# Patient Record
Sex: MALE | Race: BLACK OR AFRICAN AMERICAN
[De-identification: names, ages, dates, MRNs, and addresses within clinical notes are randomized per-mention and may not be internally consistent; named-entity substitution may affect disease eponyms.]

---

## 2017-05-09 ENCOUNTER — PRIOR ORIGINAL RECORDS (OUTPATIENT)
Dept: OTHER | Age: 67
End: 2017-05-09

## 2017-05-15 ENCOUNTER — PRIOR ORIGINAL RECORDS (OUTPATIENT)
Dept: OTHER | Age: 67
End: 2017-05-15

## 2017-05-15 ENCOUNTER — LAB ENCOUNTER (OUTPATIENT)
Dept: LAB | Facility: HOSPITAL | Age: 67
End: 2017-05-15
Attending: INTERNAL MEDICINE
Payer: MEDICARE

## 2017-05-15 DIAGNOSIS — E78.00 HYPERCHOLESTEREMIA: ICD-10-CM

## 2017-05-15 LAB
ALT (SGPT): 14 U/L
AST (SGOT): 23 U/L
CHOLESTEROL, TOTAL: 166 MG/DL
HDL CHOLESTEROL: 40 MG/DL
LDL CHOLESTEROL: 95 MG/DL
NON-HDL CHOLESTEROL: 126 MG/DL
TRIGLYCERIDES: 153 MG/DL

## 2017-05-15 PROCEDURE — 82465 ASSAY BLD/SERUM CHOLESTEROL: CPT

## 2017-05-15 PROCEDURE — 36415 COLL VENOUS BLD VENIPUNCTURE: CPT

## 2017-05-15 PROCEDURE — 84460 ALANINE AMINO (ALT) (SGPT): CPT

## 2017-05-15 PROCEDURE — 80061 LIPID PANEL: CPT

## 2017-05-15 PROCEDURE — 84450 TRANSFERASE (AST) (SGOT): CPT

## 2017-12-29 ENCOUNTER — PRIOR ORIGINAL RECORDS (OUTPATIENT)
Dept: OTHER | Age: 67
End: 2017-12-29

## 2018-02-21 ENCOUNTER — OFFICE VISIT (OUTPATIENT)
Dept: INTERNAL MEDICINE CLINIC | Facility: CLINIC | Age: 68
End: 2018-02-21

## 2018-02-21 VITALS
BODY MASS INDEX: 31.77 KG/M2 | DIASTOLIC BLOOD PRESSURE: 85 MMHG | HEART RATE: 62 BPM | TEMPERATURE: 98 F | RESPIRATION RATE: 18 BRPM | WEIGHT: 232 LBS | SYSTOLIC BLOOD PRESSURE: 135 MMHG | HEIGHT: 71.5 IN

## 2018-02-21 DIAGNOSIS — Z12.11 SCREENING FOR COLON CANCER: ICD-10-CM

## 2018-02-21 DIAGNOSIS — Z76.89 ENCOUNTER TO ESTABLISH CARE: ICD-10-CM

## 2018-02-21 DIAGNOSIS — Z12.5 PROSTATE CANCER SCREENING: ICD-10-CM

## 2018-02-21 DIAGNOSIS — I10 ESSENTIAL HYPERTENSION: Primary | ICD-10-CM

## 2018-02-21 PROCEDURE — 99205 OFFICE O/P NEW HI 60 MIN: CPT | Performed by: INTERNAL MEDICINE

## 2018-02-21 PROCEDURE — G0463 HOSPITAL OUTPT CLINIC VISIT: HCPCS | Performed by: INTERNAL MEDICINE

## 2018-02-21 RX ORDER — LISINOPRIL 20 MG/1
20 TABLET ORAL DAILY
COMMUNITY
Start: 2018-01-11 | End: 2018-12-26

## 2018-02-21 RX ORDER — HYDROCHLOROTHIAZIDE 12.5 MG/1
12.5 CAPSULE, GELATIN COATED ORAL EVERY MORNING
COMMUNITY
Start: 2018-02-20 | End: 2018-12-26

## 2018-02-21 NOTE — PATIENT INSTRUCTIONS
Problem List Items Addressed This Visit        Unprioritized    Encounter to establish care     New patient, transfer of care from currently. No significant past medical history other than hypertension which seems to be well managed.   Immunization history Prostate cancer screening        Relevant Orders    PSA SCREEN    Screening for colon cancer        Relevant Orders    OCCULT BLOOD, FECAL, IMMUNOASSAY    GASTRO - INTERNAL

## 2018-02-21 NOTE — PROGRESS NOTES
HPI:    Patient ID: Ezequiel Gavin is a 69year old male. New pt,transfer of care    Seen by Dr. Sindy Wei at [place]. Seen by Dr Franky Hernandez for cardiology.   Hx of htn and has been on lisinopril 20 mg once daily and hydrochlorothiazide 12.5 mg once Respiratory: Negative. Negative for shortness of breath. Cardiovascular: Positive for leg swelling (intermittent). Negative for PND. Medeiros off and on   Gastrointestinal: Negative. Genitourinary: Negative. Musculoskeletal: Negative.     Skin is normal. Judgment and thought content normal.   Nursing note and vitals reviewed.              ASSESSMENT/PLAN:     Problem List Items Addressed This Visit        Unprioritized    Encounter to establish care     New patient, transfer of care from currentl URINALYSIS, ROUTINE    ASSAY, THYROID STIM HORMONE    CARD TREADMILL STRESS, ADULT (QME=[id number])      Other Visit Diagnoses     Prostate cancer screening        Relevant Orders    PSA SCREEN    Screening for colon cancer        Relevant Orders    OCCULT BLOO

## 2018-02-21 NOTE — ASSESSMENT & PLAN NOTE
Blood pressure 135/85, pulse 62, temperature 97.8 °F (36.6 °C), temperature source Oral, resp. rate 18, height 5' 11.5\" (1.816 m), weight 232 lb (105.2 kg).      Stable blood pressure on lisinopril 20 mg once daily and hydrochlorothiazide 12.5 mg once melvi

## 2018-02-21 NOTE — ASSESSMENT & PLAN NOTE
New patient, transfer of care from currently. No significant past medical history other than hypertension which seems to be well managed.   Immunization history not available but he has had all his immunizations completed at [facility] to bring in rec

## 2018-06-27 ENCOUNTER — MYAURORA ACCOUNT LINK (OUTPATIENT)
Dept: OTHER | Age: 68
End: 2018-06-27

## 2018-06-27 ENCOUNTER — PRIOR ORIGINAL RECORDS (OUTPATIENT)
Dept: OTHER | Age: 68
End: 2018-06-27

## 2018-12-17 ENCOUNTER — PATIENT OUTREACH (OUTPATIENT)
Dept: CASE MANAGEMENT | Age: 68
End: 2018-12-17

## 2018-12-17 NOTE — PROGRESS NOTES
Outreached to patient in regards to scheduling Medicare Annual exam (AHA). Left message for patient to return my call at ext. [phone]. Patient is eligible at this time. Thank you.

## 2018-12-17 NOTE — PROGRESS NOTES
Patient returned my call and scheduled his Medicare Annual Exam [facility]) with his PCP for 12/26/2018. Thank you.

## 2018-12-26 ENCOUNTER — OFFICE VISIT (OUTPATIENT)
Dept: INTERNAL MEDICINE CLINIC | Facility: CLINIC | Age: 68
End: 2018-12-26
Payer: MEDICARE

## 2018-12-26 VITALS
HEART RATE: 71 BPM | WEIGHT: 236 LBS | SYSTOLIC BLOOD PRESSURE: 149 MMHG | HEIGHT: 71.5 IN | DIASTOLIC BLOOD PRESSURE: 86 MMHG | RESPIRATION RATE: 16 BRPM | BODY MASS INDEX: 32.32 KG/M2

## 2018-12-26 DIAGNOSIS — R73.03 PREDIABETES: ICD-10-CM

## 2018-12-26 DIAGNOSIS — H26.9 CATARACT, UNSPECIFIED CATARACT TYPE, UNSPECIFIED LATERALITY: ICD-10-CM

## 2018-12-26 DIAGNOSIS — Z00.00 ENCOUNTER FOR ANNUAL HEALTH EXAMINATION: ICD-10-CM

## 2018-12-26 DIAGNOSIS — Z12.11 COLON CANCER SCREENING: ICD-10-CM

## 2018-12-26 DIAGNOSIS — E78.5 DYSLIPIDEMIA: ICD-10-CM

## 2018-12-26 DIAGNOSIS — I10 ESSENTIAL HYPERTENSION: ICD-10-CM

## 2018-12-26 DIAGNOSIS — Z00.00 MEDICARE ANNUAL WELLNESS VISIT, INITIAL: Primary | ICD-10-CM

## 2018-12-26 PROCEDURE — G0439 PPPS, SUBSEQ VISIT: HCPCS | Performed by: INTERNAL MEDICINE

## 2018-12-26 PROCEDURE — 96160 PT-FOCUSED HLTH RISK ASSMT: CPT | Performed by: INTERNAL MEDICINE

## 2018-12-26 RX ORDER — LISINOPRIL 20 MG/1
20 TABLET ORAL DAILY
Qty: 90 TABLET | Refills: 2 | Status: SHIPPED | OUTPATIENT
Start: 2018-12-26 | End: 2019-01-23

## 2018-12-26 RX ORDER — HYDROCHLOROTHIAZIDE 12.5 MG/1
12.5 CAPSULE, GELATIN COATED ORAL EVERY MORNING
Qty: 90 CAPSULE | Refills: 2 | Status: SHIPPED | OUTPATIENT
Start: 2018-12-26 | End: 2019-10-16

## 2018-12-26 NOTE — ASSESSMENT & PLAN NOTE
Patient is currently not on medications. This was from his old records through cardiology. His old physician is Dr. Xenia parikh– [phone]. Will call to obtain some records at this time.

## 2018-12-26 NOTE — ASSESSMENT & PLAN NOTE
Recheck labs have been ordered, advised strict diet controlled to restrict sugars, starches and carbohydrates in diet. Exercise for about 20 minutes daily.

## 2018-12-26 NOTE — PROGRESS NOTES
HPI:   Shahrzad Day is a 77year old male who presents for a Medicare Initial Annual Wellness visit (Once after 12 month Medicare anniversary) .     His last annual assessment has been over 1 year: Annual Physical due on 11/10/1952         Fall/Risk Ass 05/15/2017    HDL 40 05/15/2017    LDL 95 05/15/2017    TRIG 153 (H) 05/15/2017          Last Chemistry Labs:   Lab Results   Component Value Date    AST 23 05/15/2017    ALT 14 (L) 05/15/2017        CBC  (most recent labs)   No results found for: WBC, HGB encounter: 5' 11.5\" (1.816 m). Weight as of this encounter: 236 lb (107 kg).     Medicare Hearing Assessment  (Required for AWV/SWV)    Questionnaire        Visual Acuity                           Physical Exam      Vaccination History     Immunization to palpation. Small hemorrhoids present.  guaic negetive brown stools. Colonoscopy is overdue–fit immunoglobulin screening study has been provided but not completed.   Additionally he was advised to follow-up with gastroenterology for a colonoscopy–Dr. Catia Brantley lifestyle, and exercise. Lab work ordered. Further testing ordered. Patient reassured. Continue with current treatment plan. Return in 4 weeks (on 1/23/2019).      Suzette Lantigua MD, 12/26/2018     General Health     In the past six months, have you l your 63th birthday    Pneumococcal 23 (Pneumovax)  Covered Once after 65 No vaccine history found Please get once after your 65th birthday    Hepatitis B for Moderate/High Risk No vaccine history found Medium/high risk factors:   End-stage renal disease specific personalized plan for behavioral change including discussion about personal harm from his obesity and benefits of his losing weight. Agree:  Through a collaborative effort, we selected treatment goals of weight based on Grace geronimo

## 2018-12-26 NOTE — ASSESSMENT & PLAN NOTE
Blood pressure 149/86, pulse 71, resp. rate 16, height 5' 11.5\" (1.816 m), weight 236 lb (107 kg). Blood pressure slightly elevated today. Patient has been on lisinopril 20 mg daily and hydrochlorothiazide 12.5 mg daily.   Reviewed his records from [address]

## 2018-12-26 NOTE — ASSESSMENT & PLAN NOTE
Normal exam.  Labs as ordered. Skin check–normal  No cervical, axillary, inguinal lymphadenopathy. Hernial orifices intact. Rectal exam normal,prostate normal to palpation. Small hemorrhoids present.  guaic negetive brown stools.   Colonoscopy is overdu

## 2018-12-26 NOTE — PATIENT INSTRUCTIONS
Problem List Items Addressed This Visit        Unprioritized    Dyslipidemia     Patient is currently not on medications. This was from his old records through cardiology. His old physician is Dr. Sigifredo parikh– [phone].   Will call to obtain some re had done at [facility]. He is advised to visit the [employer] that he has had this done at an drop of the immunization records tomorrow to update his records.              Other Visit Diagnoses     Cataract, unspecified cataract type, unspecified laterality (once between ages 73-68)  No results found for this or any previous visit.  Limited to patients who meet one of the following criteria:   • Men who are 73-68 years old and have smoked more than 100 cigarettes in their lifetime   • Anyone with a family hist retarded   Persons who live in the same house as a HepB virus carrier   Homosexual men   Illicit injectable drug abusers     Tetanus Toxoid- Only covered with a cut with metal- TD and TDaP Not covered by Medicare Part B) No orders found for this or any pre

## 2018-12-27 ENCOUNTER — LAB ENCOUNTER (OUTPATIENT)
Dept: LAB | Facility: HOSPITAL | Age: 68
End: 2018-12-27
Attending: INTERNAL MEDICINE
Payer: MEDICARE

## 2018-12-27 DIAGNOSIS — Z12.5 PROSTATE CANCER SCREENING: ICD-10-CM

## 2018-12-27 DIAGNOSIS — I10 ESSENTIAL HYPERTENSION: ICD-10-CM

## 2018-12-27 PROCEDURE — 36415 COLL VENOUS BLD VENIPUNCTURE: CPT

## 2018-12-27 PROCEDURE — 80061 LIPID PANEL: CPT

## 2018-12-27 PROCEDURE — 85025 COMPLETE CBC W/AUTO DIFF WBC: CPT

## 2018-12-27 PROCEDURE — 84443 ASSAY THYROID STIM HORMONE: CPT

## 2018-12-27 PROCEDURE — 80053 COMPREHEN METABOLIC PANEL: CPT

## 2018-12-27 PROCEDURE — 81003 URINALYSIS AUTO W/O SCOPE: CPT

## 2019-01-03 ENCOUNTER — HOSPITAL ENCOUNTER (OUTPATIENT)
Dept: CV DIAGNOSTICS | Facility: HOSPITAL | Age: 69
Discharge: HOME | End: 2019-01-03
Attending: INTERNAL MEDICINE
Payer: MEDICARE

## 2019-01-03 DIAGNOSIS — I10 ESSENTIAL HYPERTENSION: ICD-10-CM

## 2019-01-03 PROCEDURE — 93016 CV STRESS TEST SUPVJ ONLY: CPT | Performed by: INTERNAL MEDICINE

## 2019-01-03 PROCEDURE — 93018 CV STRESS TEST I&R ONLY: CPT | Performed by: INTERNAL MEDICINE

## 2019-01-03 PROCEDURE — 93017 CV STRESS TEST TRACING ONLY: CPT | Performed by: INTERNAL MEDICINE

## 2019-01-23 ENCOUNTER — OFFICE VISIT (OUTPATIENT)
Dept: INTERNAL MEDICINE CLINIC | Facility: CLINIC | Age: 69
End: 2019-01-23
Payer: MEDICARE

## 2019-01-23 VITALS
WEIGHT: 235.63 LBS | DIASTOLIC BLOOD PRESSURE: 86 MMHG | HEART RATE: 76 BPM | HEIGHT: 71.5 IN | BODY MASS INDEX: 32.27 KG/M2 | TEMPERATURE: 97 F | SYSTOLIC BLOOD PRESSURE: 143 MMHG | RESPIRATION RATE: 20 BRPM

## 2019-01-23 DIAGNOSIS — I10 ESSENTIAL HYPERTENSION: ICD-10-CM

## 2019-01-23 DIAGNOSIS — B36.9 DERMATOMYCOSIS: ICD-10-CM

## 2019-01-23 DIAGNOSIS — E78.5 DYSLIPIDEMIA: Primary | ICD-10-CM

## 2019-01-23 PROCEDURE — 99214 OFFICE O/P EST MOD 30 MIN: CPT | Performed by: INTERNAL MEDICINE

## 2019-01-23 PROCEDURE — G0463 HOSPITAL OUTPT CLINIC VISIT: HCPCS | Performed by: INTERNAL MEDICINE

## 2019-01-23 RX ORDER — OLMESARTAN MEDOXOMIL 20 MG/1
20 TABLET ORAL DAILY
Qty: 90 TABLET | Refills: 1 | Status: SHIPPED | OUTPATIENT
Start: 2019-01-23 | End: 2019-08-01

## 2019-01-23 RX ORDER — CLOTRIMAZOLE AND BETAMETHASONE DIPROPIONATE 10; .64 MG/G; MG/G
CREAM TOPICAL
Qty: 45 G | Refills: 0 | Status: SHIPPED | OUTPATIENT
Start: 2019-01-23 | End: 2019-10-16

## 2019-01-23 NOTE — ASSESSMENT & PLAN NOTE
Lipid panel looks well controlled on diet alone. Patient is advised to continue strict diet restriction of fatty foods, fried foods, desserts, sugars. Continue to monitor labs, recheck labs prior to the next office visit.

## 2019-01-23 NOTE — PROGRESS NOTES
HPI:    Patient ID: Aditya Gamboa is a 77year old male.     Normal exercise ECG   Resting HTN-appropriate response   Frequent PAC's, Occasional PVC's, brief run of atrial tachycardia in immediate recovery  No chest pain   Below average exercise capacity E apnea or a thyroid problem. Rash   This is a recurrent problem. The current episode started more than 1 month ago. The problem has been waxing and waning (2 patches of itchy dry skin left neck and upper anterior chest) since onset.  The rash is characteri to light. Neck: Normal range of motion. Neck supple. No JVD present. No thyromegaly present. Cardiovascular: Normal rate, regular rhythm, normal heart sounds and intact distal pulses.    Pulmonary/Chest: Effort normal and breath sounds normal.   Abdomin prior to the next office visit.          Relevant Orders    COMP METABOLIC PANEL (14)    LIPID PANEL    Dermatomycosis     2 itchy red patches of skin around the neck and upper chest.  Advised to keep the areas clean and dry and local application of Lotriso

## 2019-01-23 NOTE — ASSESSMENT & PLAN NOTE
Blood pressure 143/86, pulse 76, temperature 97.3 °F (36.3 °C), temperature source Oral, resp. rate 20, height 5' 11.5\" (1.816 m), weight 235 lb 9.6 oz (106.9 kg). Blood pressure remains elevated even upon recheck.   This was elevated even during the stre

## 2019-01-23 NOTE — PATIENT INSTRUCTIONS
Problem List Items Addressed This Visit        Unprioritized    Dermatomycosis     2 itchy red patches of skin around the neck and upper chest.  Advised to keep the areas clean and dry and local application of Lotrisone 1-2 times daily for the next 3-4 wee

## 2019-01-23 NOTE — ASSESSMENT & PLAN NOTE
2 itchy red patches of skin around the neck and upper chest.  Advised to keep the areas clean and dry and local application of Lotrisone 1-2 times daily for the next 3-4 weeks as directed.   Will reassess needs if any recurrence

## 2019-02-26 ENCOUNTER — TELEPHONE (OUTPATIENT)
Dept: CASE MANAGEMENT | Age: 69
End: 2019-02-26

## 2019-02-26 NOTE — TELEPHONE ENCOUNTER
Patient is eligible for a 2019 Annual Medicare Health Assessment. Patient is scheduled for a f/u appt on 4/19/19. Would he be willing to change appt to an AHA on 4/18 @ 2pm or another day? Left message to call back [phone].

## 2019-02-28 VITALS
HEART RATE: 79 BPM | HEIGHT: 72 IN | SYSTOLIC BLOOD PRESSURE: 134 MMHG | WEIGHT: 231 LBS | BODY MASS INDEX: 31.29 KG/M2 | OXYGEN SATURATION: 97 % | DIASTOLIC BLOOD PRESSURE: 80 MMHG

## 2019-03-01 VITALS
HEIGHT: 72 IN | HEART RATE: 71 BPM | SYSTOLIC BLOOD PRESSURE: 124 MMHG | BODY MASS INDEX: 31.15 KG/M2 | OXYGEN SATURATION: 97 % | WEIGHT: 230 LBS | DIASTOLIC BLOOD PRESSURE: 74 MMHG

## 2019-08-01 RX ORDER — OLMESARTAN MEDOXOMIL 20 MG/1
20 TABLET ORAL DAILY
Qty: 90 TABLET | Refills: 1 | Status: SHIPPED | OUTPATIENT
Start: 2019-08-01 | End: 2019-10-16

## 2019-08-02 NOTE — TELEPHONE ENCOUNTER
Refill passed per [facility] protocol.   Hypertensive Medications  Protocol Criteria:  · Appointment scheduled in the past 6 months or in the next 3 months  · BMP or CMP in the past 12 months  · Creatinine result < 2  Recent Outpatient Visits

## 2019-08-14 ENCOUNTER — MED REC SCAN ONLY (OUTPATIENT)
Dept: INTERNAL MEDICINE CLINIC | Facility: CLINIC | Age: 69
End: 2019-08-14

## 2019-08-14 ENCOUNTER — TELEPHONE (OUTPATIENT)
Dept: INTERNAL MEDICINE CLINIC | Facility: CLINIC | Age: 69
End: 2019-08-14

## 2019-08-15 NOTE — TELEPHONE ENCOUNTER
We received a fax from [facility] with the negative FIT test results and I notified pt. Of this and he understood.  Dr. Jeffrey Huang wrote on result that it was negative

## 2019-08-21 ENCOUNTER — MA CHART PREP (OUTPATIENT)
Dept: FAMILY MEDICINE CLINIC | Facility: CLINIC | Age: 69
End: 2019-08-21

## 2019-10-16 ENCOUNTER — OFFICE VISIT (OUTPATIENT)
Dept: INTERNAL MEDICINE CLINIC | Facility: CLINIC | Age: 69
End: 2019-10-16
Payer: MEDICARE

## 2019-10-16 VITALS
HEIGHT: 71.5 IN | WEIGHT: 235 LBS | SYSTOLIC BLOOD PRESSURE: 136 MMHG | DIASTOLIC BLOOD PRESSURE: 84 MMHG | HEART RATE: 69 BPM | BODY MASS INDEX: 32.18 KG/M2 | TEMPERATURE: 98 F

## 2019-10-16 DIAGNOSIS — E78.5 DYSLIPIDEMIA: Primary | ICD-10-CM

## 2019-10-16 DIAGNOSIS — I10 ESSENTIAL HYPERTENSION: ICD-10-CM

## 2019-10-16 DIAGNOSIS — R09.89 RIGHT CAROTID BRUIT: ICD-10-CM

## 2019-10-16 DIAGNOSIS — Z23 NEED FOR VACCINATION: ICD-10-CM

## 2019-10-16 DIAGNOSIS — R20.2 PARESTHESIA OF LEFT ARM: ICD-10-CM

## 2019-10-16 DIAGNOSIS — Z12.5 PROSTATE CANCER SCREENING: ICD-10-CM

## 2019-10-16 PROCEDURE — 90662 IIV NO PRSV INCREASED AG IM: CPT | Performed by: INTERNAL MEDICINE

## 2019-10-16 PROCEDURE — 99214 OFFICE O/P EST MOD 30 MIN: CPT | Performed by: INTERNAL MEDICINE

## 2019-10-16 PROCEDURE — 90732 PPSV23 VACC 2 YRS+ SUBQ/IM: CPT | Performed by: INTERNAL MEDICINE

## 2019-10-16 PROCEDURE — G0009 ADMIN PNEUMOCOCCAL VACCINE: HCPCS | Performed by: INTERNAL MEDICINE

## 2019-10-16 PROCEDURE — G0008 ADMIN INFLUENZA VIRUS VAC: HCPCS | Performed by: INTERNAL MEDICINE

## 2019-10-16 RX ORDER — OLMESARTAN MEDOXOMIL 20 MG/1
20 TABLET ORAL DAILY
Qty: 90 TABLET | Refills: 1 | Status: SHIPPED | OUTPATIENT
Start: 2019-10-16 | End: 2020-05-01

## 2019-10-16 RX ORDER — OLMESARTAN MEDOXOMIL 20 MG/1
20 TABLET ORAL DAILY
Qty: 90 TABLET | Refills: 1 | Status: SHIPPED | OUTPATIENT
Start: 2019-10-16 | End: 2019-10-16 | Stop reason: CLARIF

## 2019-10-16 RX ORDER — HYDROCHLOROTHIAZIDE 12.5 MG/1
12.5 CAPSULE, GELATIN COATED ORAL DAILY
COMMUNITY
End: 2019-10-16

## 2019-10-16 RX ORDER — OLMESARTAN MEDOXOMIL 20 MG/1
20 TABLET ORAL DAILY
COMMUNITY
End: 2019-10-16

## 2019-10-16 RX ORDER — HYDROCHLOROTHIAZIDE 12.5 MG/1
12.5 CAPSULE, GELATIN COATED ORAL EVERY MORNING
Qty: 90 CAPSULE | Refills: 2 | Status: SHIPPED | OUTPATIENT
Start: 2019-10-16 | End: 2019-10-16 | Stop reason: CLARIF

## 2019-10-16 RX ORDER — HYDROCHLOROTHIAZIDE 12.5 MG/1
12.5 CAPSULE, GELATIN COATED ORAL EVERY MORNING
Qty: 90 CAPSULE | Refills: 2 | Status: SHIPPED | OUTPATIENT
Start: 2019-10-16 | End: 2020-10-17

## 2019-10-16 NOTE — ASSESSMENT & PLAN NOTE
Blood pressure 136/84, pulse 69, temperature 97.8 °F (36.6 °C), temperature source Oral, height 5' 11.5\" (1.816 m), weight 235 lb (106.6 kg). Blood pressure looks stable upon recheck. He is currently on olmesartan 20 mg daily and hydrochlorothiazide 12.

## 2019-10-16 NOTE — PATIENT INSTRUCTIONS
Problem List Items Addressed This Visit        Unprioritized    Dyslipidemia - Primary     Lipid panel has been stable on diet alone.   We will follow-up after carotid Dopplers are completed and after labs are updated for further management changes if neces episode of transient weakness, numbness in the left upper extremity which has resolved. No residuals or focal neurological deficits at this time.          Relevant Orders    US CAROTID DOPPLER BILAT - DIAG IMG ([id number])      Other Visit Diagnoses     Pro

## 2019-10-16 NOTE — ASSESSMENT & PLAN NOTE
Right-sided carotid bruit and very low intensity carotid pulsation. Carotid Dopplers have been obtained especially as patient did have an episode of transient weakness, numbness in the left upper extremity which has resolved.   No residuals or focal neurol

## 2019-10-16 NOTE — PROGRESS NOTES
HPI:    Patient ID: Trell Bishop is a 77year old male. Pending but not completed. Will reorder labs at this time. Explained the need for lab tests every 6 months. Hyperlipidemia   This is a recurrent problem.  The current episode started more ze Negative. Musculoskeletal: Negative. Skin: Negative. Neurological: Positive for numbness (left arm with weakness of grio 1 week back,lasted about 4-5 hts and resolved,no recurrence. No involvement in the legs, no speech deficits.   No lightheadedn Items Addressed This Visit        Unprioritized    Essential hypertension     Blood pressure 136/84, pulse 69, temperature 97.8 °F (36.6 °C), temperature source Oral, height 5' 11.5\" (1.816 m), weight 235 lb (106.6 kg).   Blood pressure looks stable upon r transient weakness, numbness in the left upper extremity which has resolved. No residuals or focal neurological deficits at this time.          Relevant Orders    US CAROTID DOPPLER BILAT - DIAG IMG ([id number])      Other Visit Diagnoses     Prostate cance

## 2019-10-16 NOTE — ASSESSMENT & PLAN NOTE
Approximately 6-hour episode of numbness and weakness in the left arm that resolved gradually, episode a few days back. No recurrences since. Will follow-up after the labs are completed and carotid Dopplers completed.

## 2019-10-16 NOTE — ASSESSMENT & PLAN NOTE
Lipid panel has been stable on diet alone. We will follow-up after carotid Dopplers are completed and after labs are updated for further management changes if necessary.

## 2019-10-22 ENCOUNTER — OFFICE VISIT (OUTPATIENT)
Dept: OPHTHALMOLOGY | Facility: CLINIC | Age: 69
End: 2019-10-22
Payer: MEDICARE

## 2019-10-22 DIAGNOSIS — H25.13 AGE-RELATED NUCLEAR CATARACT OF BOTH EYES: ICD-10-CM

## 2019-10-22 DIAGNOSIS — H43.393 VITREOUS FLOATERS OF BOTH EYES: ICD-10-CM

## 2019-10-22 DIAGNOSIS — H40.003 GLAUCOMA SUSPECT OF BOTH EYES: Primary | ICD-10-CM

## 2019-10-22 PROCEDURE — 92015 DETERMINE REFRACTIVE STATE: CPT | Performed by: OPHTHALMOLOGY

## 2019-10-22 PROCEDURE — 92004 COMPRE OPH EXAM NEW PT 1/>: CPT | Performed by: OPHTHALMOLOGY

## 2019-10-22 PROCEDURE — 92250 FUNDUS PHOTOGRAPHY W/I&R: CPT | Performed by: OPHTHALMOLOGY

## 2019-10-22 NOTE — PATIENT INSTRUCTIONS
Glaucoma suspect of both eyes  Discussed with patient that he is a glaucoma suspect based on increased cupping of the optic nerves in both eyes. Retinal photos taken today to document optic nerves. Glaucoma diagnostic testing ordered.   Will not start me healthcare provider. This may be a sign of an eye problem. Date Last Reviewed: 10/1/2017  © 3147-9260 [employer]. [address] [facility], [address] [place] [address]. All rights reserved.  This information is not intended as a substitute for cam not intended as a substitute for professional medical care. Always follow your healthcare professional's instructions. What Are Cataracts? A clear lens in the eye focuses light. This lets the eye see images sharply.  Aging is the most common cause

## 2019-10-22 NOTE — PROGRESS NOTES
Marina Torres is a 77year old male. HPI:     HPI     Consult      Additional comments: Consult per Dr. Al Castillo patient here for a complete exam per Dr. Raj Thapa.   He complains of a FB sensation in his right for several month Dist ph cc  20/25    Near cc 20/25 20/20    Correction:  Glasses          Tonometry ([facility], 3:04 PM)       Right Left    Pressure 14 13          Pupils       Pupils    Right PERRL    Left PERRL          Visual Fields       Left Right     Full Full will continue to observe. Patient verbalized understanding. Age-related nuclear cataract of both eyes  Discussed moderate cataracts in both eyes that are not affecting vision and are not surgical at this time. New glasses today; suggest update.

## 2019-10-29 ENCOUNTER — HOSPITAL ENCOUNTER (OUTPATIENT)
Age: 69
Discharge: HOME | End: 2019-10-29
Attending: EMERGENCY MEDICINE
Payer: MEDICARE

## 2019-10-29 ENCOUNTER — OFFICE VISIT (OUTPATIENT)
Dept: FAMILY MEDICINE CLINIC | Facility: CLINIC | Age: 69
End: 2019-10-29

## 2019-10-29 ENCOUNTER — APPOINTMENT (OUTPATIENT)
Dept: GENERAL RADIOLOGY | Age: 69
End: 2019-10-29
Attending: EMERGENCY MEDICINE
Payer: MEDICARE

## 2019-10-29 VITALS
HEART RATE: 79 BPM | BODY MASS INDEX: 33 KG/M2 | TEMPERATURE: 98 F | WEIGHT: 238 LBS | DIASTOLIC BLOOD PRESSURE: 88 MMHG | SYSTOLIC BLOOD PRESSURE: 150 MMHG | OXYGEN SATURATION: 97 % | RESPIRATION RATE: 18 BRPM

## 2019-10-29 VITALS
BODY MASS INDEX: 31.83 KG/M2 | HEART RATE: 72 BPM | WEIGHT: 235 LBS | HEIGHT: 72 IN | TEMPERATURE: 98 F | RESPIRATION RATE: 18 BRPM | DIASTOLIC BLOOD PRESSURE: 86 MMHG | OXYGEN SATURATION: 97 % | SYSTOLIC BLOOD PRESSURE: 175 MMHG

## 2019-10-29 DIAGNOSIS — R03.0 ELEVATED BLOOD PRESSURE READING: ICD-10-CM

## 2019-10-29 DIAGNOSIS — M54.9 TENDERNESS OVER SPINE: ICD-10-CM

## 2019-10-29 DIAGNOSIS — M54.50 LUMBAR BACK PAIN: Primary | ICD-10-CM

## 2019-10-29 DIAGNOSIS — M54.50 ACUTE BILATERAL LOW BACK PAIN WITHOUT SCIATICA: Primary | ICD-10-CM

## 2019-10-29 PROCEDURE — 72100 X-RAY EXAM L-S SPINE 2/3 VWS: CPT | Performed by: EMERGENCY MEDICINE

## 2019-10-29 PROCEDURE — 99213 OFFICE O/P EST LOW 20 MIN: CPT

## 2019-10-29 PROCEDURE — 99204 OFFICE O/P NEW MOD 45 MIN: CPT

## 2019-10-29 RX ORDER — IBUPROFEN 600 MG/1
600 TABLET ORAL ONCE
Status: COMPLETED | OUTPATIENT
Start: 2019-10-29 | End: 2019-10-29

## 2019-10-29 NOTE — ED PROVIDER NOTES
Patient Seen in: [address]      History   Patient presents with:  Back Pain (musculoskeletal)    Stated Complaint: lower back pain    HPI    27-year-old male presents for complaint of lower back pain.   Pain began yester Resp 18   Temp 97.5 °F (36.4 °C)   Temp src Oral   SpO2 97 %   O2 Device None (Room air)       Current:BP (!) 175/86   Pulse 72   Temp 97.5 °F (36.4 °C) (Oral)   Resp 18   Ht 182.9 cm (6')   Wt 106.6 kg   SpO2 97%   BMI 31.87 kg/m²         Physical Exam drug abuse, no saddle anesthesia. Spinal epidural abscess and cauda equina are low on my differential.  Imaging with some degenerative changes. He is encouraged to use upper-or Tylenol for pain. Primary care follow-up encouraged.   Return precautions give re-evaluation        Medications Prescribed:  Current Discharge Medication List

## 2019-10-29 NOTE — ED INITIAL ASSESSMENT (HPI)
Pt states having lower back pain for 2 days. Pt states was seen in [facility] and was told to come in for x-ray. Pt denies numbness in lower extremities.

## 2019-10-29 NOTE — PROGRESS NOTES
CHIEF COMPLAINT:     Patient presents with:  Back Pain: started two days ago, no trauma, does do a lot of sitting, mid back and radiates around the lower back on both sides      HPI:   Alissa Raymundo is a 77year old male who is here for complaints of low 1x/month    Drug use: No       REVIEW OF SYSTEMS:   GENERAL:no fatigue  SKIN: denies any unusual skin lesions  LUNGS: denies shortness of breath   CARDIOVASCULAR: denies chest pain or palpitations  GI: denies abdominal pain, N/VC/D.    : no dysuria, urgen Follow up prn or if symptoms worsen or persist within a day as discussed. Patient understands and agrees with plan.      Lennie Raymundo PA-C  10/29/2019

## 2019-10-30 ENCOUNTER — HOSPITAL ENCOUNTER (OUTPATIENT)
Dept: ULTRASOUND IMAGING | Facility: HOSPITAL | Age: 69
Discharge: HOME | End: 2019-10-30
Attending: INTERNAL MEDICINE
Payer: MEDICARE

## 2019-10-30 ENCOUNTER — HOSPITAL ENCOUNTER (OUTPATIENT)
Dept: CV DIAGNOSTICS | Facility: HOSPITAL | Age: 69
Discharge: HOME | End: 2019-10-30
Attending: INTERNAL MEDICINE
Payer: MEDICARE

## 2019-10-30 DIAGNOSIS — R09.89 RIGHT CAROTID BRUIT: ICD-10-CM

## 2019-10-30 DIAGNOSIS — I10 ESSENTIAL HYPERTENSION: ICD-10-CM

## 2019-10-30 DIAGNOSIS — R20.2 PARESTHESIA OF LEFT ARM: ICD-10-CM

## 2019-10-30 PROCEDURE — 93880 EXTRACRANIAL BILAT STUDY: CPT | Performed by: INTERNAL MEDICINE

## 2019-10-30 PROCEDURE — 93306 TTE W/DOPPLER COMPLETE: CPT | Performed by: INTERNAL MEDICINE

## 2019-10-31 ENCOUNTER — LAB ENCOUNTER (OUTPATIENT)
Dept: LAB | Facility: HOSPITAL | Age: 69
End: 2019-10-31
Attending: INTERNAL MEDICINE
Payer: MEDICARE

## 2019-10-31 ENCOUNTER — OFFICE VISIT (OUTPATIENT)
Dept: INTERNAL MEDICINE CLINIC | Facility: CLINIC | Age: 69
End: 2019-10-31
Payer: MEDICARE

## 2019-10-31 VITALS
HEART RATE: 72 BPM | BODY MASS INDEX: 31.69 KG/M2 | SYSTOLIC BLOOD PRESSURE: 174 MMHG | DIASTOLIC BLOOD PRESSURE: 93 MMHG | TEMPERATURE: 98 F | WEIGHT: 234 LBS | HEIGHT: 72 IN

## 2019-10-31 DIAGNOSIS — Z12.5 PROSTATE CANCER SCREENING: ICD-10-CM

## 2019-10-31 DIAGNOSIS — M54.50 LOW BACK PAIN, UNSPECIFIED BACK PAIN LATERALITY, UNSPECIFIED CHRONICITY, UNSPECIFIED WHETHER SCIATICA PRESENT: Primary | ICD-10-CM

## 2019-10-31 DIAGNOSIS — I10 ESSENTIAL HYPERTENSION: ICD-10-CM

## 2019-10-31 DIAGNOSIS — E78.5 DYSLIPIDEMIA: ICD-10-CM

## 2019-10-31 DIAGNOSIS — M25.551 HIP PAIN, RIGHT: ICD-10-CM

## 2019-10-31 DIAGNOSIS — R20.2 PARESTHESIA OF LEFT ARM: ICD-10-CM

## 2019-10-31 PROCEDURE — 99213 OFFICE O/P EST LOW 20 MIN: CPT | Performed by: INTERNAL MEDICINE

## 2019-10-31 PROCEDURE — 80053 COMPREHEN METABOLIC PANEL: CPT

## 2019-10-31 PROCEDURE — 80061 LIPID PANEL: CPT

## 2019-10-31 PROCEDURE — 82306 VITAMIN D 25 HYDROXY: CPT

## 2019-10-31 PROCEDURE — 81003 URINALYSIS AUTO W/O SCOPE: CPT

## 2019-10-31 PROCEDURE — 85025 COMPLETE CBC W/AUTO DIFF WBC: CPT

## 2019-10-31 PROCEDURE — 82607 VITAMIN B-12: CPT

## 2019-10-31 PROCEDURE — 84443 ASSAY THYROID STIM HORMONE: CPT

## 2019-10-31 PROCEDURE — 36415 COLL VENOUS BLD VENIPUNCTURE: CPT

## 2019-10-31 RX ORDER — MELOXICAM 15 MG/1
15 TABLET ORAL DAILY
Qty: 15 TABLET | Refills: 0 | Status: SHIPPED | OUTPATIENT
Start: 2019-10-31 | End: 2019-11-15

## 2019-10-31 RX ORDER — IBUPROFEN 200 MG
200 TABLET ORAL EVERY 6 HOURS PRN
COMMUNITY
End: 2019-10-31

## 2019-10-31 NOTE — PATIENT INSTRUCTIONS
ASSESSMENT AND PLAN:   Back pain and right-sided hip pain-most likely musculoskeletal.  I did review the x-ray that was done with urgent care.   At this point I would like to treat with anti-inflammatory medicine (meloxicam 15 mg once a day with meals) his

## 2019-10-31 NOTE — PROGRESS NOTES
Jo Christine is a 77year old male. Patient presents with:  Back Pain: was in urgent care on monday, c/o R-lower back pain that radiates to the front    HPI:   64-year-old gentleman here for evaluation of back pain and right hip pain.   Patient reports ze Hypertension Brother    • Heart Disorder Brother    • Diabetes Neg    • Glaucoma Neg    • Macular degeneration Neg       No Known Allergies     REVIEW OF SYSTEMS:     Review of Systems   Constitutional: Negative for activity change, appetite change and fev with anti-inflammatory medicine (meloxicam 15 mg once a day with meals) his kidney function is normal that was done today. I will also encourage him to use topical analgesics like BenGay and massage the lower back on the hip area 2-3 times a day.   If ther

## 2019-11-14 ENCOUNTER — NURSE ONLY (OUTPATIENT)
Dept: OPHTHALMOLOGY | Facility: CLINIC | Age: 69
End: 2019-11-14
Payer: MEDICARE

## 2019-11-14 DIAGNOSIS — H40.003 GLAUCOMA SUSPECT OF BOTH EYES: ICD-10-CM

## 2019-11-14 PROCEDURE — 92083 EXTENDED VISUAL FIELD XM: CPT | Performed by: OPHTHALMOLOGY

## 2019-11-14 PROCEDURE — 92133 CPTRZD OPH DX IMG PST SGM ON: CPT | Performed by: OPHTHALMOLOGY

## 2019-11-14 PROCEDURE — 76514 ECHO EXAM OF EYE THICKNESS: CPT | Performed by: OPHTHALMOLOGY

## 2019-11-14 NOTE — PROGRESS NOTES
Marina Torres is a 68year old male.     HPI:     HPI     Patient is here for a VF, OCT and pachymetry with no MD.     Last edited by Leobardo Garcia OT on 11/14/2019 11:09 AM. (History)        Patient History:  Past Medical History:   Diagnosis Date   • A exam.    11/14/2019  Scribed by: Lamine Alfredo MD

## 2019-11-25 ENCOUNTER — MA CHART PREP (OUTPATIENT)
Dept: FAMILY MEDICINE CLINIC | Facility: CLINIC | Age: 69
End: 2019-11-25

## 2019-12-17 ENCOUNTER — OFFICE VISIT (OUTPATIENT)
Dept: FAMILY MEDICINE CLINIC | Facility: CLINIC | Age: 69
End: 2019-12-17
Payer: MEDICARE

## 2019-12-17 VITALS
OXYGEN SATURATION: 97 % | TEMPERATURE: 98 F | HEIGHT: 72 IN | DIASTOLIC BLOOD PRESSURE: 80 MMHG | SYSTOLIC BLOOD PRESSURE: 130 MMHG | BODY MASS INDEX: 31.15 KG/M2 | RESPIRATION RATE: 18 BRPM | WEIGHT: 230 LBS | HEART RATE: 72 BPM

## 2019-12-17 DIAGNOSIS — R05.9 COUGH: Primary | ICD-10-CM

## 2019-12-17 DIAGNOSIS — H65.91 RIGHT NON-SUPPURATIVE OTITIS MEDIA: ICD-10-CM

## 2019-12-17 PROCEDURE — 99202 OFFICE O/P NEW SF 15 MIN: CPT | Performed by: NURSE PRACTITIONER

## 2019-12-17 RX ORDER — AZITHROMYCIN 250 MG/1
TABLET, FILM COATED ORAL
Qty: 6 TABLET | Refills: 0 | Status: SHIPPED | OUTPATIENT
Start: 2019-12-17 | End: 2019-12-27

## 2019-12-17 NOTE — PROGRESS NOTES
CHIEF COMPLAINT:   Patient presents with:  URI: ear pain when coughing,       HPI:   Ce Chance is a 68year old male who presents to clinic today with complaints of right ear pain that worsens when he is coughing. Has had URI and cough for 3  weeks. in no apparent distress  SKIN: no rashes, no suspicious lesions  HEAD: atraumatic, normocephalic  EYES: conjunctiva clear, EOM intact  EARS: Tragus non tender on palpation bilaterally. External auditory canals healthy.  Right TM: 8/10 patchy erythema, + bul wheezing or wheezing not responding to the treatment you were given, or lack of improvement in symptoms, new symptoms or worsening symptoms please seek immediate care at the ER as these can be symptoms or more serious illness requiring emergent evaluation

## 2019-12-27 ENCOUNTER — OFFICE VISIT (OUTPATIENT)
Dept: INTERNAL MEDICINE CLINIC | Facility: CLINIC | Age: 69
End: 2019-12-27
Payer: MEDICARE

## 2019-12-27 ENCOUNTER — HOSPITAL ENCOUNTER (OUTPATIENT)
Dept: GENERAL RADIOLOGY | Facility: HOSPITAL | Age: 69
Discharge: HOME | End: 2019-12-27
Attending: INTERNAL MEDICINE
Payer: MEDICARE

## 2019-12-27 VITALS
HEIGHT: 72 IN | HEART RATE: 74 BPM | BODY MASS INDEX: 31.97 KG/M2 | WEIGHT: 236 LBS | SYSTOLIC BLOOD PRESSURE: 135 MMHG | RESPIRATION RATE: 16 BRPM | DIASTOLIC BLOOD PRESSURE: 88 MMHG

## 2019-12-27 DIAGNOSIS — E78.5 DYSLIPIDEMIA: ICD-10-CM

## 2019-12-27 DIAGNOSIS — I10 ESSENTIAL HYPERTENSION: ICD-10-CM

## 2019-12-27 DIAGNOSIS — I27.20 PULMONARY HTN (HCC): ICD-10-CM

## 2019-12-27 DIAGNOSIS — Z00.00 MEDICARE ANNUAL WELLNESS VISIT, SUBSEQUENT: ICD-10-CM

## 2019-12-27 DIAGNOSIS — Z00.00 ENCOUNTER FOR ANNUAL HEALTH EXAMINATION: ICD-10-CM

## 2019-12-27 DIAGNOSIS — H25.13 AGE-RELATED NUCLEAR CATARACT OF BOTH EYES: ICD-10-CM

## 2019-12-27 DIAGNOSIS — R73.03 PREDIABETES: ICD-10-CM

## 2019-12-27 DIAGNOSIS — Z12.11 COLON CANCER SCREENING: ICD-10-CM

## 2019-12-27 DIAGNOSIS — Z11.59 NEED FOR HEPATITIS C SCREENING TEST: ICD-10-CM

## 2019-12-27 DIAGNOSIS — R05.9 COUGH: ICD-10-CM

## 2019-12-27 DIAGNOSIS — R05.9 COUGH: Primary | ICD-10-CM

## 2019-12-27 DIAGNOSIS — H40.003 GLAUCOMA SUSPECT OF BOTH EYES: ICD-10-CM

## 2019-12-27 PROBLEM — B36.9 DERMATOMYCOSIS: Status: RESOLVED | Noted: 2019-01-23 | Resolved: 2019-12-27

## 2019-12-27 PROBLEM — R20.2 PARESTHESIA OF LEFT ARM: Status: RESOLVED | Noted: 2019-10-16 | Resolved: 2019-12-27

## 2019-12-27 PROCEDURE — 96160 PT-FOCUSED HLTH RISK ASSMT: CPT | Performed by: INTERNAL MEDICINE

## 2019-12-27 PROCEDURE — 71046 X-RAY EXAM CHEST 2 VIEWS: CPT | Performed by: INTERNAL MEDICINE

## 2019-12-27 PROCEDURE — G0439 PPPS, SUBSEQ VISIT: HCPCS | Performed by: INTERNAL MEDICINE

## 2019-12-27 PROCEDURE — 99397 PER PM REEVAL EST PAT 65+ YR: CPT | Performed by: INTERNAL MEDICINE

## 2019-12-27 NOTE — ASSESSMENT & PLAN NOTE
Persistent cough–intermittent for the past 4 weeks. No hemoptysis or shortness of breath. Air entry looks normal bilaterally. Chest x-ray has been ordered and will follow-up after completion.   Advised to drink plenty of warm fluids, Claritin 10 mg 1 tab

## 2019-12-27 NOTE — PROGRESS NOTES
HPI:   Braden Brown is a 68year old male who presents for a Medicare Subsequent Annual Wellness visit (Pt already had Initial Annual Wellness).       His last annual assessment has been over 1 year: Annual Physical due on 12/26/2019         Fall/Risk A Medicine)    Patient Active Problem List:     Essential hypertension     Medicare annual wellness visit, subsequent     Dyslipidemia     Prediabetes     Body mass index (BMI) of 32.0-32.9 in adult     Right carotid bruit     Glaucoma suspect of both eyes of Systems   GENERAL: feels well otherwise  SKIN: denies any unusual skin lesions  EYES: denies blurred vision or double vision  HEENT: denies nasal congestion, sinus pain or ST  LUNGS: denies shortness of breath with exertion  CARDIOVASCULAR: denies chest social activities because I cannot hear well and fear I will reply improperly:  No   Family members and friends have told me they think I may have hearing loss:   No               Visual Acuity  Right Eye Visual Acuity: Corrected Right Eye Chart Acuity: 20/ Administered Date(s) Administered   • FLU VACC High Dose 65 YRS & Older PRSV Free (83146) 09/13/2018, 10/16/2019   • Fluzone Vaccine Medicare () 01/02/2017, 09/13/2017   • Pneumococcal (Prevnar 13) 01/02/2017   • Pneumovax 23 10/16/2019        ASSES has had a normal stress echo done recently. His echocardiogram showed borderline elevation in pulmonary pressures at 30. Repeat echocardiogram next year.          Relevant Orders    COMP METABOLIC PANEL (14)    LIPID PANEL    CBC WITH DIFFERENTIAL WITH PL SUMMARY:   Diagnoses and all orders for this visit:    Cough  -     XR CHEST PA + LAT CHEST (CPT=71046);  Future    Age-related nuclear cataract of both eyes    Glaucoma suspect of both eyes    Body mass index (BMI) of 32.0-32.9 in adult  -     BEHAVIOR COU due on 11/10/1998 Update Health Maintenance if applicable    Flex Sigmoidoscopy Screen every 10 years No results found for this or any previous visit. No flowsheet data found.      Fecal Occult Blood Annually No results found for: FOB No flowsheet data foun No results found for: DIGOXIN, DIG, VALP No flowsheet data found. Sharri Hector is a 68year old male with a Body mass index is 32.01 kg/m².    HPI:   Sharri Hector was screened and found to have a BMI => 30, and is alert and competent for c

## 2019-12-27 NOTE — PATIENT INSTRUCTIONS
Problem List Items Addressed This Visit        Unprioritized    Age-related nuclear cataract of both eyes     Stable at this time without any significant visual compromise. Continue to monitor.          Body mass index (BMI) of 32.0-32.9 in adult     Stric Medicare annual wellness visit, subsequent     Normal exam.  Labs as ordered. Skin check–normal  No cervical, axillary, inguinal lymphadenopathy. Hernial orifices intact. Rectal exam normal,prostate normal to palpation. Small hemorrhoids present.   gu Diabetics No results found for: A1C No flowsheet data found.     Fasting Blood Sugar (FSB)   Patient must be diagnosed with one of the following:   • Hypertension   • Dyslipidemia   • Obesity (BMI ³30 kg/m2)   • Previous elevated impaired FBS or GTT   … or Ophthalmology Visit   Covered annually for Diabetics, people with Glaucoma family history,   Americans over age 48   Americans over age 72 No flowsheet data found.  OK to schedule if you are in this risk group, make sure you have a referral Medical Society website. http://www. idph.state. [place]/public/books/advin.htm  A link to the [employer]. This site has a lot of good information including definitions of the different types of Advance Directives.  It also has the [facility]

## 2019-12-27 NOTE — ASSESSMENT & PLAN NOTE
Borderline elevation in the pulmonary pressures on his last echocardiogram.  Will repeat next year. Asymptomatic at this time.

## 2019-12-27 NOTE — ASSESSMENT & PLAN NOTE
Normal exam.  Labs as ordered. Skin check–normal  No cervical, axillary, inguinal lymphadenopathy. Hernial orifices intact. Rectal exam normal,prostate normal to palpation. Small hemorrhoids present.  guaic negetive brown stools.   Colonoscopy due on

## 2019-12-27 NOTE — ASSESSMENT & PLAN NOTE
Strict diet control for fatty foods, fried foods, desserts, sugars as well as salty foods. Walk for about 15 to 20 minutes daily. Smaller portion sizes, increase vegetables in every meal.  Eat only between 6 AM and 6 PM in the evening.   Try to avoid eati

## 2019-12-27 NOTE — ASSESSMENT & PLAN NOTE
Blood pressure 135/88, pulse 74, resp. rate 16, height 6' (1.829 m), weight 236 lb (107 kg). Stable blood pressure, well controlled on olmesartan 20 mg daily and hydrochlorothiazide 12.5 mg daily. He has occasional swelling of his left lower extremity.

## 2020-05-01 DIAGNOSIS — I10 ESSENTIAL HYPERTENSION: ICD-10-CM

## 2020-05-01 RX ORDER — OLMESARTAN MEDOXOMIL 20 MG/1
TABLET ORAL
Qty: 90 TABLET | Refills: 1 | Status: SHIPPED | OUTPATIENT
Start: 2020-05-01 | End: 2020-05-07

## 2020-05-07 ENCOUNTER — TELEPHONE (OUTPATIENT)
Dept: INTERNAL MEDICINE CLINIC | Facility: CLINIC | Age: 70
End: 2020-05-07

## 2020-05-07 DIAGNOSIS — I10 ESSENTIAL HYPERTENSION: ICD-10-CM

## 2020-05-07 RX ORDER — OLMESARTAN MEDOXOMIL 20 MG/1
20 TABLET ORAL DAILY
Qty: 90 TABLET | Refills: 1 | Status: SHIPPED | OUTPATIENT
Start: 2020-05-07 | End: 2021-03-10

## 2020-05-07 NOTE — TELEPHONE ENCOUNTER
Patient is requesting a refill.  Please send to [address] [place] [address] [address] order pharmacy    OLMESARTAN MEDOXOMIL 20 MG Oral Tab

## 2020-05-08 ENCOUNTER — VIRTUAL PHONE E/M (OUTPATIENT)
Dept: INTERNAL MEDICINE CLINIC | Facility: CLINIC | Age: 70
End: 2020-05-08
Payer: MEDICARE

## 2020-05-08 VITALS
TEMPERATURE: 98 F | HEIGHT: 72 IN | BODY MASS INDEX: 31.15 KG/M2 | WEIGHT: 230 LBS | DIASTOLIC BLOOD PRESSURE: 86 MMHG | SYSTOLIC BLOOD PRESSURE: 139 MMHG

## 2020-05-08 DIAGNOSIS — R73.03 PREDIABETES: ICD-10-CM

## 2020-05-08 DIAGNOSIS — E78.5 DYSLIPIDEMIA: ICD-10-CM

## 2020-05-08 DIAGNOSIS — I10 ESSENTIAL HYPERTENSION: Primary | ICD-10-CM

## 2020-05-08 PROCEDURE — 99441 PHONE E/M BY PHYS 5-10 MIN: CPT | Performed by: PHYSICIAN ASSISTANT

## 2020-05-08 NOTE — PROGRESS NOTES
Virtual Telephone Check-In    Chris Raya verbally consents to a Virtual/Telephone Check-In visit on 05/08/20. Patient understands and accepts financial responsibility for any deductible, co-insurance and/or co-pays associated with this service.     D

## 2020-05-21 ENCOUNTER — TELEPHONE (OUTPATIENT)
Dept: OPHTHALMOLOGY | Facility: CLINIC | Age: 70
End: 2020-05-21

## 2020-05-21 NOTE — TELEPHONE ENCOUNTER
I explained to patient that we do not measure PD's, but that he can go to any optical shop and they can measure it.

## 2020-05-21 NOTE — TELEPHONE ENCOUNTER
Patient received RX for glasses in October and states PD eye to eye measurement is missing from RX.  Please advise

## 2020-06-30 ENCOUNTER — LAB ENCOUNTER (OUTPATIENT)
Dept: LAB | Facility: HOSPITAL | Age: 70
End: 2020-06-30
Attending: INTERNAL MEDICINE
Payer: MEDICARE

## 2020-06-30 DIAGNOSIS — I10 ESSENTIAL HYPERTENSION: ICD-10-CM

## 2020-06-30 DIAGNOSIS — Z11.59 NEED FOR HEPATITIS C SCREENING TEST: ICD-10-CM

## 2020-06-30 DIAGNOSIS — R73.03 PREDIABETES: ICD-10-CM

## 2020-06-30 LAB
ALBUMIN SERPL-MCNC: 3.6 G/DL (ref 3.4–5)
ALBUMIN/GLOB SERPL: 0.9 {RATIO} (ref 1–2)
ALP LIVER SERPL-CCNC: 66 U/L (ref 45–117)
ALT SERPL-CCNC: 15 U/L (ref 16–61)
ANION GAP SERPL CALC-SCNC: 5 MMOL/L (ref 0–18)
AST SERPL-CCNC: 16 U/L (ref 15–37)
BASOPHILS # BLD AUTO: 0.05 X10(3) UL (ref 0–0.2)
BASOPHILS NFR BLD AUTO: 0.6 %
BILIRUB SERPL-MCNC: 0.6 MG/DL (ref 0.1–2)
BUN BLD-MCNC: 16 MG/DL (ref 7–18)
BUN/CREAT SERPL: 13.6 (ref 10–20)
CALCIUM BLD-MCNC: 9.3 MG/DL (ref 8.5–10.1)
CHLORIDE SERPL-SCNC: 108 MMOL/L (ref 98–112)
CHOLEST SMN-MCNC: 160 MG/DL (ref ?–200)
CO2 SERPL-SCNC: 29 MMOL/L (ref 21–32)
CREAT BLD-MCNC: 1.18 MG/DL (ref 0.7–1.3)
DEPRECATED RDW RBC AUTO: 43.5 FL (ref 35.1–46.3)
EOSINOPHIL # BLD AUTO: 0.16 X10(3) UL (ref 0–0.7)
EOSINOPHIL NFR BLD AUTO: 1.8 %
ERYTHROCYTE [DISTWIDTH] IN BLOOD BY AUTOMATED COUNT: 13.9 % (ref 11–15)
EST. AVERAGE GLUCOSE BLD GHB EST-MCNC: 131 MG/DL (ref 68–126)
GLOBULIN PLAS-MCNC: 4 G/DL (ref 2.8–4.4)
GLUCOSE BLD-MCNC: 91 MG/DL (ref 70–99)
HBA1C MFR BLD HPLC: 6.2 % (ref ?–5.7)
HCT VFR BLD AUTO: 43.5 % (ref 39–53)
HCV AB SERPL QL IA: NONREACTIVE
HDLC SERPL-MCNC: 38 MG/DL (ref 40–59)
HGB BLD-MCNC: 14.1 G/DL (ref 13–17.5)
IMM GRANULOCYTES # BLD AUTO: 0.02 X10(3) UL (ref 0–1)
IMM GRANULOCYTES NFR BLD: 0.2 %
LDLC SERPL CALC-MCNC: 94 MG/DL (ref ?–100)
LYMPHOCYTES # BLD AUTO: 3.26 X10(3) UL (ref 1–4)
LYMPHOCYTES NFR BLD AUTO: 37.4 %
M PROTEIN MFR SERPL ELPH: 7.6 G/DL (ref 6.4–8.2)
MCH RBC QN AUTO: 27.7 PG (ref 26–34)
MCHC RBC AUTO-ENTMCNC: 32.4 G/DL (ref 31–37)
MCV RBC AUTO: 85.5 FL (ref 80–100)
MONOCYTES # BLD AUTO: 0.9 X10(3) UL (ref 0.1–1)
MONOCYTES NFR BLD AUTO: 10.3 %
NEUTROPHILS # BLD AUTO: 4.32 X10 (3) UL (ref 1.5–7.7)
NEUTROPHILS # BLD AUTO: 4.32 X10(3) UL (ref 1.5–7.7)
NEUTROPHILS NFR BLD AUTO: 49.7 %
NONHDLC SERPL-MCNC: 122 MG/DL (ref ?–130)
OSMOLALITY SERPL CALC.SUM OF ELEC: 295 MOSM/KG (ref 275–295)
PATIENT FASTING Y/N/NP: YES
PATIENT FASTING Y/N/NP: YES
PLATELET # BLD AUTO: 289 10(3)UL (ref 150–450)
POTASSIUM SERPL-SCNC: 3.8 MMOL/L (ref 3.5–5.1)
RBC # BLD AUTO: 5.09 X10(6)UL (ref 3.8–5.8)
SODIUM SERPL-SCNC: 142 MMOL/L (ref 136–145)
TRIGL SERPL-MCNC: 141 MG/DL (ref 30–149)
VLDLC SERPL CALC-MCNC: 28 MG/DL (ref 0–30)
WBC # BLD AUTO: 8.7 X10(3) UL (ref 4–11)

## 2020-06-30 PROCEDURE — 36415 COLL VENOUS BLD VENIPUNCTURE: CPT

## 2020-06-30 PROCEDURE — 86803 HEPATITIS C AB TEST: CPT

## 2020-06-30 PROCEDURE — 83036 HEMOGLOBIN GLYCOSYLATED A1C: CPT

## 2020-06-30 PROCEDURE — 85025 COMPLETE CBC W/AUTO DIFF WBC: CPT

## 2020-06-30 PROCEDURE — 80053 COMPREHEN METABOLIC PANEL: CPT

## 2020-06-30 PROCEDURE — 80061 LIPID PANEL: CPT

## 2020-07-28 ENCOUNTER — NURSE TRIAGE (OUTPATIENT)
Dept: INTERNAL MEDICINE CLINIC | Facility: CLINIC | Age: 70
End: 2020-07-28

## 2020-07-28 NOTE — TELEPHONE ENCOUNTER
Action Requested: Summary for Provider     []  Critical Lab, Recommendations Needed  [] Need Additional Advice  [x]   FYI    []   Need Orders  [] Need Medications Sent to Pharmacy  []  Other     SUMMARY:     Spoke with pt,  verified, pt stated his siste

## 2020-07-29 ENCOUNTER — VIRTUAL PHONE E/M (OUTPATIENT)
Dept: INTERNAL MEDICINE CLINIC | Facility: CLINIC | Age: 70
End: 2020-07-29
Payer: MEDICARE

## 2020-07-29 ENCOUNTER — LAB ENCOUNTER (OUTPATIENT)
Dept: LAB | Facility: HOSPITAL | Age: 70
End: 2020-07-29
Attending: NURSE PRACTITIONER
Payer: MEDICARE

## 2020-07-29 DIAGNOSIS — U07.1 COVID-19: ICD-10-CM

## 2020-07-29 DIAGNOSIS — Z20.822 CLOSE EXPOSURE TO COVID-19 VIRUS: ICD-10-CM

## 2020-07-29 DIAGNOSIS — U07.1 COVID-19: Primary | ICD-10-CM

## 2020-07-29 PROCEDURE — 99442 PHONE E/M BY PHYS 11-20 MIN: CPT | Performed by: NURSE PRACTITIONER

## 2020-07-29 NOTE — PROGRESS NOTES
HPI:    Patient ID: Kalpana Mckeon is a 68year old male. Please note that the following visit was completed using two-way, real-time interactive audio and/or video communication.   This has been done in good tushar to provide continuity of care in the best constipation. Endocrine: Negative for cold intolerance and heat intolerance. Genitourinary: Negative for dysuria and hematuria. Musculoskeletal: Negative for back pain and joint swelling. Skin: Negative for rash.    Neurological: Negative for weakne Relevant Orders    SARS-COV-2 BY PCR ()         Telephone visit 12 minutes    No follow-ups on file.     Orders Placed This Encounter      COVID-19 Testing [E]      Meds This Visit:  Requested Prescriptions      No prescriptions requested or ordered

## 2020-07-29 NOTE — ASSESSMENT & PLAN NOTE
A/P 68year-old who complains of some respiratory symptoms runny nose and sore throat. His sister is suspected of having COVID because she has fever, diarrhea, body aches, chills.   He lives with her and her doctor said that he should get a COVID test.  Hi

## 2020-08-01 LAB — SARS-COV-2 BY PCR: NOT DETECTED

## 2020-10-01 ENCOUNTER — OFFICE VISIT (OUTPATIENT)
Dept: INTERNAL MEDICINE CLINIC | Facility: CLINIC | Age: 70
End: 2020-10-01
Payer: MEDICARE

## 2020-10-01 VITALS
SYSTOLIC BLOOD PRESSURE: 136 MMHG | BODY MASS INDEX: 32.91 KG/M2 | HEIGHT: 72 IN | WEIGHT: 243 LBS | DIASTOLIC BLOOD PRESSURE: 80 MMHG | HEART RATE: 79 BPM

## 2020-10-01 DIAGNOSIS — H40.003 GLAUCOMA SUSPECT OF BOTH EYES: ICD-10-CM

## 2020-10-01 DIAGNOSIS — Z12.5 PROSTATE CANCER SCREENING: ICD-10-CM

## 2020-10-01 DIAGNOSIS — I10 ESSENTIAL HYPERTENSION: ICD-10-CM

## 2020-10-01 DIAGNOSIS — R19.5 GUAIAC POSITIVE STOOLS: ICD-10-CM

## 2020-10-01 DIAGNOSIS — Z00.00 ENCOUNTER FOR ANNUAL HEALTH EXAMINATION: ICD-10-CM

## 2020-10-01 DIAGNOSIS — H25.13 AGE-RELATED NUCLEAR CATARACT OF BOTH EYES: ICD-10-CM

## 2020-10-01 DIAGNOSIS — Z00.00 MEDICARE ANNUAL WELLNESS VISIT, SUBSEQUENT: Primary | ICD-10-CM

## 2020-10-01 DIAGNOSIS — Z12.11 COLON CANCER SCREENING: ICD-10-CM

## 2020-10-01 DIAGNOSIS — E78.5 DYSLIPIDEMIA: ICD-10-CM

## 2020-10-01 DIAGNOSIS — R73.03 PREDIABETES: ICD-10-CM

## 2020-10-01 DIAGNOSIS — I27.20 PULMONARY HTN (HCC): ICD-10-CM

## 2020-10-01 PROBLEM — R05.9 COUGH: Status: RESOLVED | Noted: 2019-12-17 | Resolved: 2020-10-01

## 2020-10-01 PROBLEM — Z20.822 CLOSE EXPOSURE TO COVID-19 VIRUS: Status: RESOLVED | Noted: 2020-07-29 | Resolved: 2020-10-01

## 2020-10-01 PROCEDURE — 96160 PT-FOCUSED HLTH RISK ASSMT: CPT | Performed by: INTERNAL MEDICINE

## 2020-10-01 PROCEDURE — 3079F DIAST BP 80-89 MM HG: CPT | Performed by: INTERNAL MEDICINE

## 2020-10-01 PROCEDURE — 3075F SYST BP GE 130 - 139MM HG: CPT | Performed by: INTERNAL MEDICINE

## 2020-10-01 PROCEDURE — 3008F BODY MASS INDEX DOCD: CPT | Performed by: INTERNAL MEDICINE

## 2020-10-01 PROCEDURE — G0439 PPPS, SUBSEQ VISIT: HCPCS | Performed by: INTERNAL MEDICINE

## 2020-10-01 PROCEDURE — 99397 PER PM REEVAL EST PAT 65+ YR: CPT | Performed by: INTERNAL MEDICINE

## 2020-10-01 NOTE — PATIENT INSTRUCTIONS
Problem List Items Addressed This Visit        Unprioritized    Age-related nuclear cataract of both eyes     Bilateral early cataracts. He has been seen by ophthalmology in October 2019. Follow-up evaluation recommended.            Body mass index (BMI) requested to set up an appointment for gastroenterology evaluation– Dr. Bharat Garnett. Ankur/Dr. Au/Dr. [id number]–[phone] for an appointment. Immunizations–.   Immunization History   Administered Date(s) Administered   • FLU VACC High Dose 65 YRS & O • Hypertension   • Dyslipidemia   • Obesity (BMI ³30 kg/m2)   • Previous elevated impaired FBS or GTT   … or any two of the following:   • Overweight (BMI ³25 but <30)   • Family history of diabetes   • Age 72 years or older   • History of gestational di No flowsheet data found.  OK to schedule if you are in this risk group, make sure you have a referral   Prostate Cancer Screening      PSA  Annually to 75 then as needed or HOMERO annually to 75 PSA due on 10/31/2021  No results found for this or any previous information including definitions of the different types of Advance Directives. It also has the State forms available on it's website for anyone to review and print using their home computer and printer. (the forms are also available in [address])  www. [email or web address]

## 2020-10-01 NOTE — ASSESSMENT & PLAN NOTE
Bilateral early cataracts. He has been seen by ophthalmology in October 2019. Follow-up evaluation recommended.

## 2020-10-01 NOTE — ASSESSMENT & PLAN NOTE
Blood sugars have been stable on diet control alone. Weight remains a concern. Recheck labs have been ordered.   Wt Readings from Last 6 Encounters:  10/01/20 : 243 lb (110.2 kg)  05/08/20 : 230 lb (104.3 kg)  12/27/19 : 236 lb (107 kg)  12/17/19 : 230 lb

## 2020-10-01 NOTE — ASSESSMENT & PLAN NOTE
Normal exam.  Labs as ordered. Skin check–normal  No cervical, axillary, inguinal lymphadenopathy. Hernial orifices intact. Rectal exam normal,prostate normal to palpation. Small hemorrhoids present.  guaic positive, brown stools.     Colonoscopy is o

## 2020-10-01 NOTE — PROGRESS NOTES
HPI:   Elizabet Nicholas is a 68year old male who presents for a Medicare Subsequent Annual Wellness visit (Pt already had Initial Annual Wellness).     Annual Physical due on 10/01/2021        Fall/Risk Assessment   He has been screened for Falls and is lo eyes     Vitreous floaters of both eyes     Pulmonary HTN ([address]    Wt Readings from Last 3 Encounters:  10/01/20 : 243 lb (110.2 kg)  05/08/20 : 230 lb (104.3 kg)  12/27/19 : 236 lb (107 kg)     Last Cholesterol Labs:   Lab Results   Component Value Date denies abdominal pain, denies heartburn  : 0 per night nocturia, no complaint of urinary incontinence  MUSCULOSKELETAL: denies back pain  NEURO: denies headaches  PSYCHE: denies depression or anxiety  HEMATOLOGIC: denies hx of anemia  ENDOCRINE: denies t Eye Chart Acuity: 20/30   Left Eye Visual Acuity: Corrected Left Eye Chart Acuity: 20/30   Both Eyes Visual Acuity: Corrected Both Eyes Chart Acuity: 20/30   Able To Tolerate Visual Acuity: Yes      Physical Exam   Nursing note and vitals reviewed.    Const 01/02/2017   • Pneumovax 23 10/16/2019        ASSESSMENT AND OTHER RELEVANT CHRONIC CONDITIONS:   Alissa Raymundo is a 68year old male who presents for a Medicare Assessment.      Problem List Items Addressed This Visit        Unprioritized    Age-related n an appointment.   Fit immunoglobulin screening study completed in August 2019 looked normal.    Given guaiac positive stools and patient being overdue for colonoscopy he is requested to set up an appointment for gastroenterology evaluation– Dr. Kellee Davidson Future    Dyslipidemia    Pulmonary HTN (HCC)  -     CARD ECHO 2D DOPPLER (CPT=93306);  Future    Prediabetes    Body mass index (BMI) of 32.0-32.9 in adult    Glaucoma suspect of both eyes    Age-related nuclear cataract of both eyes    Prostate cancer scr No results found for this or any previous visit. No flowsheet data found. Fecal Occult Blood Annually No results found for: FOB No flowsheet data found.     Glaucoma Screening      Ophthalmology Visit Annually: Diabetics, FHx Glaucoma, AA>50, >

## 2020-10-01 NOTE — ASSESSMENT & PLAN NOTE
Strict diet controlled to restrict fatty foods, fried foods, desserts and sugars. Exercise for about 15 to 20 minutes daily. Increase vegetables in the diet and reduce portion sizes otherwise. Can try to avoid snacking as much as possible.   Monitor as d

## 2020-10-01 NOTE — ASSESSMENT & PLAN NOTE
Patient has been on diet control alone and lipid panel has been stable. Due for recheck labs–orders provided.

## 2020-10-01 NOTE — ASSESSMENT & PLAN NOTE
Blood pressure 136/80, pulse 79, height 6' (1.829 m), weight 243 lb (110.2 kg). Blood pressures look stable at this time. Continue on olmesartan 20 mg daily, hydrochlorothiazide 12.5 mg daily. No chest pain, palpitations or shortness of breath.   No lowe

## 2020-10-15 DIAGNOSIS — I10 ESSENTIAL HYPERTENSION: ICD-10-CM

## 2020-10-17 RX ORDER — HYDROCHLOROTHIAZIDE 12.5 MG/1
CAPSULE, GELATIN COATED ORAL
Qty: 90 CAPSULE | Refills: 2 | Status: SHIPPED | OUTPATIENT
Start: 2020-10-17 | End: 2021-11-03

## 2020-11-05 ENCOUNTER — TELEPHONE (OUTPATIENT)
Dept: GASTROENTEROLOGY | Facility: CLINIC | Age: 70
End: 2020-11-05

## 2020-11-05 ENCOUNTER — OFFICE VISIT (OUTPATIENT)
Dept: GASTROENTEROLOGY | Facility: CLINIC | Age: 70
End: 2020-11-05
Payer: MEDICARE

## 2020-11-05 VITALS
HEIGHT: 72 IN | DIASTOLIC BLOOD PRESSURE: 74 MMHG | HEART RATE: 88 BPM | TEMPERATURE: 98 F | SYSTOLIC BLOOD PRESSURE: 123 MMHG | WEIGHT: 242 LBS | BODY MASS INDEX: 32.78 KG/M2

## 2020-11-05 DIAGNOSIS — Z12.11 COLON CANCER SCREENING: Primary | ICD-10-CM

## 2020-11-05 DIAGNOSIS — Z12.11 SCREEN FOR COLON CANCER: Primary | ICD-10-CM

## 2020-11-05 PROCEDURE — 3008F BODY MASS INDEX DOCD: CPT | Performed by: INTERNAL MEDICINE

## 2020-11-05 PROCEDURE — 3074F SYST BP LT 130 MM HG: CPT | Performed by: INTERNAL MEDICINE

## 2020-11-05 PROCEDURE — 3078F DIAST BP <80 MM HG: CPT | Performed by: INTERNAL MEDICINE

## 2020-11-05 RX ORDER — A/SINGAPORE/GP1908/2015 IVR-180 (AN A/MICHIGAN/45/2015 (H1N1)PDM09-LIKE VIRUS, A/HONG KONG/4801/2014, NYMC X-263B (H3N2) (AN A/HONG KONG/4801/2014-LIKE VIRUS), AND B/BRISBANE/60/2008, WILD TYPE (A B/BRISBANE/60/2008-LIKE VIRUS) 15; 15; 15 UG/.5ML; UG/.5ML; UG/.5ML
INJECTION, SUSPENSION INTRAMUSCULAR
COMMUNITY
Start: 2020-09-25 | End: 2021-07-14

## 2020-11-05 RX ORDER — POLYETHYLENE GLYCOL 3350, SODIUM CHLORIDE, SODIUM BICARBONATE, POTASSIUM CHLORIDE 420; 11.2; 5.72; 1.48 G/4L; G/4L; G/4L; G/4L
POWDER, FOR SOLUTION ORAL
Qty: 1 BOTTLE | Refills: 0 | Status: SHIPPED | OUTPATIENT
Start: 2020-11-05 | End: 2021-07-14

## 2020-11-05 NOTE — TELEPHONE ENCOUNTER
Scheduled for:  Colonoscopy 53543  Provider Name: Dr Roberto Polo  Date:  Mon 12/14/2020   Location: [facility]    Sedation: MAC   Time: 2:00 pm, pt is aware that [address] will Friday before with arrival time  Prep: single dose trilyte   Meds/Allergies Reconciled?:  JOHNSON Tom

## 2020-11-05 NOTE — PATIENT INSTRUCTIONS
1. Schedule colonoscopy with MAC [Diagnosis: screening]    2.  bowel prep from pharmacy (single dose Trilyte)  -Buy over the counter dulcolax laxative, and take daily for 3 days prior to drinking the bowel prep.      3. Continue all medications for p

## 2020-11-06 NOTE — H&P
[address] [facility] [address] Gastroenterology                                                                                                  Clinic History and Physical     Pa Bicarb-NaCl (TRILYTE) 420 g Oral Recon Soln Take prep as directed by gastro office. May substitute with Trilyte/generic equivalent if needed. 1 Bottle 0   • HYDROCHLOROTHIAZIDE 12.5 MG Oral Cap TAKE 1 CAPSULE EVERY MORNING.  90 capsule 2   • Olmesartan Medo evaluation. NO anemia noted on lab work. # Average Risk screening: patient is considered average risk for colon cancer (NO family hx of colon cancer) and it is appropriate to proceed with screening colonoscopy.  Patient is currently asymptomatic and den

## 2020-12-11 ENCOUNTER — LAB REQUISITION (OUTPATIENT)
Dept: LAB | Facility: HOSPITAL | Age: 70
End: 2020-12-11
Payer: MEDICARE

## 2020-12-11 DIAGNOSIS — Z01.818 ENCOUNTER FOR OTHER PREPROCEDURAL EXAMINATION: ICD-10-CM

## 2020-12-14 ENCOUNTER — SURGERY CENTER DOCUMENTATION (OUTPATIENT)
Dept: SURGERY | Age: 70
End: 2020-12-14

## 2020-12-14 DIAGNOSIS — K63.5 POLYP OF COLON, UNSPECIFIED PART OF COLON, UNSPECIFIED TYPE: ICD-10-CM

## 2020-12-14 PROCEDURE — 45385 COLONOSCOPY W/LESION REMOVAL: CPT | Performed by: INTERNAL MEDICINE

## 2020-12-14 PROCEDURE — 88305 TISSUE EXAM BY PATHOLOGIST: CPT | Performed by: INTERNAL MEDICINE

## 2020-12-14 NOTE — PROCEDURES
COLONOSCOPY REPORT    Ansley Annemarie SNOW 11/10/1950 Age 65year old   PCP Bety Sharma MD Endoscopist Aretha Bean MD     Date of procedure: 20    Location: [address] [place] [address])    Procedure: Colonoscopy w/cold snar retrieved. 2. Diverticulosis: pan-colonic diverticulosis. 3. Terminal ileum: the visualized mucosa appeared normal.    4. A retroflexed view of the rectum revealed small internal hemorrhoids.     5. The colonic mucosa throughout the colon showed elizabeth

## 2020-12-15 ENCOUNTER — LAB REQUISITION (OUTPATIENT)
Dept: LAB | Facility: HOSPITAL | Age: 70
End: 2020-12-15
Payer: MEDICARE

## 2020-12-15 DIAGNOSIS — Z12.11 ENCOUNTER FOR SCREENING FOR MALIGNANT NEOPLASM OF COLON: ICD-10-CM

## 2020-12-22 ENCOUNTER — TELEPHONE (OUTPATIENT)
Dept: GASTROENTEROLOGY | Facility: CLINIC | Age: 70
End: 2020-12-22

## 2021-03-05 DIAGNOSIS — Z23 NEED FOR VACCINATION: ICD-10-CM

## 2021-03-09 DIAGNOSIS — I10 ESSENTIAL HYPERTENSION: ICD-10-CM

## 2021-03-09 NOTE — TELEPHONE ENCOUNTER
Patient is calling to follow up and states medication wont be sent on time and is requesting for a short supply of medication Olmesartan Medoxomil 20 MG Oral Tab. Patient is [facility] that [facility].

## 2021-03-09 NOTE — TELEPHONE ENCOUNTER
Per pharmacy on file, patient needs refill on his Olmesartan Medoxomil     Current Outpatient Medications   Medication Sig Dispense Refill   •       •       •       • Olmesartan Medoxomil 20 MG Oral Tab Take 1 tablet (20 mg total) by mouth daily.  90 tablet

## 2021-03-10 RX ORDER — OLMESARTAN MEDOXOMIL 20 MG/1
20 TABLET ORAL DAILY
Qty: 30 TABLET | Refills: 1 | Status: SHIPPED | OUTPATIENT
Start: 2021-03-10 | End: 2021-05-20

## 2021-05-20 DIAGNOSIS — I10 ESSENTIAL HYPERTENSION: ICD-10-CM

## 2021-05-21 DIAGNOSIS — I10 ESSENTIAL HYPERTENSION: ICD-10-CM

## 2021-05-23 RX ORDER — OLMESARTAN MEDOXOMIL 20 MG/1
20 TABLET ORAL DAILY
Qty: 30 TABLET | Refills: 1 | Status: SHIPPED | OUTPATIENT
Start: 2021-05-23 | End: 2021-05-23

## 2021-05-23 RX ORDER — OLMESARTAN MEDOXOMIL 20 MG/1
20 TABLET ORAL DAILY
Qty: 30 TABLET | Refills: 1 | Status: SHIPPED | OUTPATIENT
Start: 2021-05-23 | End: 2021-10-06

## 2021-05-23 RX ORDER — OLMESARTAN MEDOXOMIL 20 MG/1
TABLET ORAL
Qty: 30 TABLET | Refills: 0 | OUTPATIENT
Start: 2021-05-23

## 2021-07-14 ENCOUNTER — OFFICE VISIT (OUTPATIENT)
Dept: INTERNAL MEDICINE CLINIC | Facility: CLINIC | Age: 71
End: 2021-07-14
Payer: MEDICARE

## 2021-07-14 ENCOUNTER — LAB ENCOUNTER (OUTPATIENT)
Dept: LAB | Facility: HOSPITAL | Age: 71
End: 2021-07-14
Attending: INTERNAL MEDICINE
Payer: MEDICARE

## 2021-07-14 VITALS
SYSTOLIC BLOOD PRESSURE: 136 MMHG | WEIGHT: 247 LBS | BODY MASS INDEX: 33.46 KG/M2 | RESPIRATION RATE: 18 BRPM | HEART RATE: 69 BPM | DIASTOLIC BLOOD PRESSURE: 78 MMHG | HEIGHT: 72 IN

## 2021-07-14 DIAGNOSIS — Z12.5 PROSTATE CANCER SCREENING: ICD-10-CM

## 2021-07-14 DIAGNOSIS — R73.03 PREDIABETES: ICD-10-CM

## 2021-07-14 DIAGNOSIS — I27.20 PULMONARY HTN (HCC): ICD-10-CM

## 2021-07-14 DIAGNOSIS — I10 ESSENTIAL HYPERTENSION: ICD-10-CM

## 2021-07-14 DIAGNOSIS — H40.003 GLAUCOMA SUSPECT OF BOTH EYES: ICD-10-CM

## 2021-07-14 DIAGNOSIS — E78.5 DYSLIPIDEMIA: ICD-10-CM

## 2021-07-14 DIAGNOSIS — Z00.00 ENCOUNTER FOR ANNUAL HEALTH EXAMINATION: Primary | ICD-10-CM

## 2021-07-14 DIAGNOSIS — Z00.00 MEDICARE ANNUAL WELLNESS VISIT, SUBSEQUENT: ICD-10-CM

## 2021-07-14 LAB
ALBUMIN SERPL-MCNC: 3.5 G/DL (ref 3.4–5)
ALBUMIN/GLOB SERPL: 0.9 {RATIO} (ref 1–2)
ALP LIVER SERPL-CCNC: 59 U/L
ALT SERPL-CCNC: 17 U/L
ANION GAP SERPL CALC-SCNC: 4 MMOL/L (ref 0–18)
AST SERPL-CCNC: 17 U/L (ref 15–37)
BASOPHILS # BLD AUTO: 0.06 X10(3) UL (ref 0–0.2)
BASOPHILS NFR BLD AUTO: 0.8 %
BILIRUB SERPL-MCNC: 0.6 MG/DL (ref 0.1–2)
BILIRUB UR QL: NEGATIVE
BUN BLD-MCNC: 12 MG/DL (ref 7–18)
BUN/CREAT SERPL: 11.1 (ref 10–20)
CALCIUM BLD-MCNC: 8.9 MG/DL (ref 8.5–10.1)
CHLORIDE SERPL-SCNC: 110 MMOL/L (ref 98–112)
CHOLEST SMN-MCNC: 165 MG/DL (ref ?–200)
CLARITY UR: CLEAR
CO2 SERPL-SCNC: 28 MMOL/L (ref 21–32)
COLOR UR: YELLOW
COMPLEXED PSA SERPL-MCNC: 0.82 NG/ML (ref ?–4)
CREAT BLD-MCNC: 1.08 MG/DL
DEPRECATED RDW RBC AUTO: 41.3 FL (ref 35.1–46.3)
EOSINOPHIL # BLD AUTO: 0.09 X10(3) UL (ref 0–0.7)
EOSINOPHIL NFR BLD AUTO: 1.1 %
ERYTHROCYTE [DISTWIDTH] IN BLOOD BY AUTOMATED COUNT: 13.3 % (ref 11–15)
GLOBULIN PLAS-MCNC: 3.7 G/DL (ref 2.8–4.4)
GLUCOSE BLD-MCNC: 94 MG/DL (ref 70–99)
GLUCOSE UR-MCNC: NEGATIVE MG/DL
HCT VFR BLD AUTO: 43.3 %
HDLC SERPL-MCNC: 38 MG/DL (ref 40–59)
HGB BLD-MCNC: 13.7 G/DL
HGB UR QL STRIP.AUTO: NEGATIVE
IMM GRANULOCYTES # BLD AUTO: 0.02 X10(3) UL (ref 0–1)
IMM GRANULOCYTES NFR BLD: 0.3 %
KETONES UR-MCNC: NEGATIVE MG/DL
LDLC SERPL CALC-MCNC: 104 MG/DL (ref ?–100)
LEUKOCYTE ESTERASE UR QL STRIP.AUTO: NEGATIVE
LYMPHOCYTES # BLD AUTO: 2.88 X10(3) UL (ref 1–4)
LYMPHOCYTES NFR BLD AUTO: 36.8 %
M PROTEIN MFR SERPL ELPH: 7.2 G/DL (ref 6.4–8.2)
MCH RBC QN AUTO: 27 PG (ref 26–34)
MCHC RBC AUTO-ENTMCNC: 31.6 G/DL (ref 31–37)
MCV RBC AUTO: 85.4 FL
MONOCYTES # BLD AUTO: 0.81 X10(3) UL (ref 0.1–1)
MONOCYTES NFR BLD AUTO: 10.3 %
NEUTROPHILS # BLD AUTO: 3.97 X10 (3) UL (ref 1.5–7.7)
NEUTROPHILS # BLD AUTO: 3.97 X10(3) UL (ref 1.5–7.7)
NEUTROPHILS NFR BLD AUTO: 50.7 %
NITRITE UR QL STRIP.AUTO: NEGATIVE
NONHDLC SERPL-MCNC: 127 MG/DL (ref ?–130)
OSMOLALITY SERPL CALC.SUM OF ELEC: 294 MOSM/KG (ref 275–295)
PATIENT FASTING Y/N/NP: YES
PATIENT FASTING Y/N/NP: YES
PH UR: 6 [PH] (ref 5–8)
PLATELET # BLD AUTO: 281 10(3)UL (ref 150–450)
POTASSIUM SERPL-SCNC: 4.2 MMOL/L (ref 3.5–5.1)
PROT UR-MCNC: NEGATIVE MG/DL
RBC # BLD AUTO: 5.07 X10(6)UL
SODIUM SERPL-SCNC: 142 MMOL/L (ref 136–145)
SP GR UR STRIP: 1.02 (ref 1–1.03)
TRIGL SERPL-MCNC: 129 MG/DL (ref 30–149)
TSI SER-ACNC: 0.64 MIU/ML (ref 0.36–3.74)
UROBILINOGEN UR STRIP-ACNC: 2
VIT B12 SERPL-MCNC: 412 PG/ML (ref 193–986)
VLDLC SERPL CALC-MCNC: 22 MG/DL (ref 0–30)
WBC # BLD AUTO: 7.8 X10(3) UL (ref 4–11)

## 2021-07-14 PROCEDURE — 3078F DIAST BP <80 MM HG: CPT | Performed by: INTERNAL MEDICINE

## 2021-07-14 PROCEDURE — 99397 PER PM REEVAL EST PAT 65+ YR: CPT | Performed by: INTERNAL MEDICINE

## 2021-07-14 PROCEDURE — 81003 URINALYSIS AUTO W/O SCOPE: CPT

## 2021-07-14 PROCEDURE — 82607 VITAMIN B-12: CPT

## 2021-07-14 PROCEDURE — 84443 ASSAY THYROID STIM HORMONE: CPT

## 2021-07-14 PROCEDURE — G0439 PPPS, SUBSEQ VISIT: HCPCS | Performed by: INTERNAL MEDICINE

## 2021-07-14 PROCEDURE — 80053 COMPREHEN METABOLIC PANEL: CPT

## 2021-07-14 PROCEDURE — 3075F SYST BP GE 130 - 139MM HG: CPT | Performed by: INTERNAL MEDICINE

## 2021-07-14 PROCEDURE — 80061 LIPID PANEL: CPT

## 2021-07-14 PROCEDURE — 85025 COMPLETE CBC W/AUTO DIFF WBC: CPT

## 2021-07-14 PROCEDURE — 3008F BODY MASS INDEX DOCD: CPT | Performed by: INTERNAL MEDICINE

## 2021-07-14 PROCEDURE — 96160 PT-FOCUSED HLTH RISK ASSMT: CPT | Performed by: INTERNAL MEDICINE

## 2021-07-14 PROCEDURE — 36415 COLL VENOUS BLD VENIPUNCTURE: CPT

## 2021-07-14 NOTE — ASSESSMENT & PLAN NOTE
2D echo Doppler of the heart ordered in October has not yet been completed. Advised to complete this and will follow-up.

## 2021-07-14 NOTE — PROGRESS NOTES
HPI:   Ce Chance is a 65year old male who presents for a Medicare Subsequent Annual Wellness visit (Pt already had Initial Annual Wellness).       No topic due editable text        Fall/Risk Assessment   He has been screened for Falls and is low ris Pulmonary HTN ([facility] [address]    Wt Readings from Last 3 Encounters:  07/14/21 : 247 lb (112 kg)  11/05/20 : 242 lb (109.8 kg)  10/01/20 : 243 lb (110.2 kg)     Last Cholesterol Labs:   Lab Results   Component Value Date    CHOLEST 160 06/30/2020    HDL 38 (L) 06/30 nocturia, no complaint of urinary incontinence  MUSCULOSKELETAL: denies back pain  NEURO: denies headaches  PSYCHE: denies depression or anxiety  HEMATOLOGIC: denies hx of anemia  ENDOCRINE: denies thyroid history  ALL/ASTHMA: denies hx of allergy or asthm appearance. He is well-developed and normal weight. He is not ill-appearing. HENT:      Head: Normocephalic and atraumatic.       Right Ear: Tympanic membrane, ear canal and external ear normal.      Left Ear: Tympanic membrane, ear canal and external ear No sensory deficit. Motor: No weakness or abnormal muscle tone. Coordination: Coordination normal.      Gait: Gait normal.      Deep Tendon Reflexes: Reflexes are normal and symmetric.    Psychiatric:         Mood and Affect: Mood normal. he did have some PVCs and atrial runs. He has not had any syncopal or presyncopal episodes. Monitor at this time. Glaucoma suspect of both eyes     Patient has been monitored per cardiology.   Visual fields have remained normal.  Referral provided mass index (BMI) of 33.0-33.9 in adult  -     BEHAVIOR  OBESITY 15M    Dyslipidemia    Essential hypertension    Medicare annual wellness visit, subsequent    Glaucoma suspect of both eyes  -     OPHTHALMOLOGY - INTERNAL    Prediabetes    Pulmonary Component Value Date    CHOLEST 160 06/30/2020    HDL 38 (L) 06/30/2020    LDL 94 06/30/2020    TRIG 141 06/30/2020         Electrocardiogram (EKG)   Covered if needed at Welcome to Medicare, and non-screening if indicated for medical reasons -      Ultr 06/30/2020         Creatinine   Annually Lab Results   Component Value Date    CREATSERUM 1.18 06/30/2020         BUN Annually Lab Results   Component Value Date    BUN 16 06/30/2020       Drug Serum Conc Annually No results found for: DIGOXIN, DIG, VALP Counseled: 15 min     Vignesh Frances MD

## 2021-07-14 NOTE — ASSESSMENT & PLAN NOTE
Patient has been on diet control at home. Recheck labs pending.   Wt Readings from Last 6 Encounters:  07/14/21 : 247 lb (112 kg)  11/05/20 : 242 lb (109.8 kg)  10/01/20 : 243 lb (110.2 kg)  05/08/20 : 230 lb (104.3 kg)  12/27/19 : 236 lb (107 kg)  12/17/1

## 2021-07-14 NOTE — ASSESSMENT & PLAN NOTE
Blood pressure 136/78, pulse 69, resp. rate 18, height 6' (1.829 m), weight 247 lb (112 kg). Stable blood pressure, well controlled at this time. Continue on olmesartan 20 mg daily, hydrochlorothiazide 12.5 mg daily.   He does not have any chest pain, pal

## 2021-07-14 NOTE — ASSESSMENT & PLAN NOTE
Patient has been on diet control alone. Due for recheck labs which have been ordered but pending at this time. Patient plans to have this done today.

## 2021-07-14 NOTE — PATIENT INSTRUCTIONS
Problem List Items Addressed This Visit        Unprioritized    Body mass index (BMI) of 33.0-33.9 in adult     Strict diet control to restrict fatty foods, fried foods, portion sizes. Avoid unnecessary desserts and sugars.   Walk for about 15 to 20 minute 10/16/2019   • Fluzone Vaccine Medicare () 01/02/2017, 09/13/2017, 10/01/2018, 10/16/2019   • Influenza 09/29/2020   • Pneumococcal (Prevnar 13) 01/02/2017   • Pneumovax 23 10/16/2019                Prediabetes     Patient has been on diet control at non-screening if indicated for medical reasons -      Ultrasound Screening for Abdominal Aortic Aneurysm (AAA) Covered once in a lifetime for one of the following risk factors   • Men who are 73-68 years old and have ever smoked   • Anyone with a family hi Annually No results found for: DIGOXIN, DIG, VALP              Recommended Websites for Advanced Directives    [email or web address] org/publications/[email or web address] pdf  An information packet, including necessary form from the [facility]

## 2021-07-14 NOTE — ASSESSMENT & PLAN NOTE
Normal exam.  Labs as ordered. Skin check–normal  No cervical, axillary, inguinal lymphadenopathy. Hernial orifices intact. Rectal exam normal,prostate normal to palpation. Small hemorrhoids present.  guaic negetive brown stools.   PSA due on 10/31/2021

## 2021-07-14 NOTE — ASSESSMENT & PLAN NOTE
Patient has been monitored per cardiology. Visual fields have remained normal.  Referral provided for Dr. Davidson Turner.

## 2021-08-23 ENCOUNTER — HOSPITAL ENCOUNTER (OUTPATIENT)
Dept: CV DIAGNOSTICS | Facility: HOSPITAL | Age: 71
Discharge: HOME | End: 2021-08-23
Attending: INTERNAL MEDICINE
Payer: MEDICARE

## 2021-08-23 DIAGNOSIS — I27.20 PULMONARY HTN (HCC): ICD-10-CM

## 2021-08-23 PROCEDURE — 93306 TTE W/DOPPLER COMPLETE: CPT | Performed by: INTERNAL MEDICINE

## 2021-10-06 DIAGNOSIS — I10 ESSENTIAL HYPERTENSION: ICD-10-CM

## 2021-10-06 RX ORDER — OLMESARTAN MEDOXOMIL 20 MG/1
20 TABLET ORAL DAILY
Qty: 90 TABLET | Refills: 1 | Status: SHIPPED | OUTPATIENT
Start: 2021-10-06

## 2021-10-06 NOTE — TELEPHONE ENCOUNTER
Refill passed per [facility] protocol.   Requested Prescriptions   Pending Prescriptions Disp Refills    OLMESARTAN 20 MG Oral Tab [Pharmacy Med Name: Olmesartan Medoxomil 20 MG Oral Tablet] 30 tablet 0     Sig: Take 1 tablet by mouth once daily

## 2021-10-14 ENCOUNTER — OFFICE VISIT (OUTPATIENT)
Dept: INTERNAL MEDICINE CLINIC | Facility: CLINIC | Age: 71
End: 2021-10-14
Payer: MEDICARE

## 2021-10-14 VITALS
WEIGHT: 238 LBS | RESPIRATION RATE: 16 BRPM | DIASTOLIC BLOOD PRESSURE: 80 MMHG | HEIGHT: 72 IN | BODY MASS INDEX: 32.23 KG/M2 | SYSTOLIC BLOOD PRESSURE: 130 MMHG | TEMPERATURE: 98 F | HEART RATE: 67 BPM

## 2021-10-14 DIAGNOSIS — I27.20 PULMONARY HTN (HCC): ICD-10-CM

## 2021-10-14 DIAGNOSIS — E78.5 DYSLIPIDEMIA: ICD-10-CM

## 2021-10-14 DIAGNOSIS — I10 ESSENTIAL HYPERTENSION: Primary | ICD-10-CM

## 2021-10-14 DIAGNOSIS — R49.9 HOARSENESS OR CHANGING VOICE: ICD-10-CM

## 2021-10-14 DIAGNOSIS — R73.03 PREDIABETES: ICD-10-CM

## 2021-10-14 PROCEDURE — 3075F SYST BP GE 130 - 139MM HG: CPT | Performed by: INTERNAL MEDICINE

## 2021-10-14 PROCEDURE — G0008 ADMIN INFLUENZA VIRUS VAC: HCPCS | Performed by: INTERNAL MEDICINE

## 2021-10-14 PROCEDURE — 99214 OFFICE O/P EST MOD 30 MIN: CPT | Performed by: INTERNAL MEDICINE

## 2021-10-14 PROCEDURE — 90662 IIV NO PRSV INCREASED AG IM: CPT | Performed by: INTERNAL MEDICINE

## 2021-10-14 PROCEDURE — 3008F BODY MASS INDEX DOCD: CPT | Performed by: INTERNAL MEDICINE

## 2021-10-14 PROCEDURE — 3079F DIAST BP 80-89 MM HG: CPT | Performed by: INTERNAL MEDICINE

## 2021-10-14 RX ORDER — PANTOPRAZOLE SODIUM 40 MG/1
40 TABLET, DELAYED RELEASE ORAL
Qty: 30 TABLET | Refills: 0 | Status: SHIPPED | OUTPATIENT
Start: 2021-10-14 | End: 2022-10-14

## 2021-10-14 NOTE — PATIENT INSTRUCTIONS
Problem List Items Addressed This Visit        Unprioritized    Dyslipidemia     Lipid panel had improved on diet control alone. We will follow-up in repeat labs with the next blood draw.          Essential hypertension - Primary     Blood pressure 130/80, Prediabetes     Patient has been on diet control with this time. He has lost some weight. He has been watching his diet carefully. Recheck hemoglobin A1c has directed with the next blood draw.          Relevant Orders    HEMOGLOBIN A1C    Pulmonary HTN (

## 2021-10-14 NOTE — ASSESSMENT & PLAN NOTE
Blood pressure 130/80, pulse 67, temperature 97.8 °F (36.6 °C), temperature source Temporal, resp. rate 16, height 6' (1.829 m), weight 238 lb (108 kg). Blood pressures look stable, well controlled at this time.   Continue on olmesartan 20 mg daily, hydroc

## 2021-10-14 NOTE — ASSESSMENT & PLAN NOTE
Lipid panel had improved on diet control alone. We will follow-up in repeat labs with the next blood draw.

## 2021-10-14 NOTE — ASSESSMENT & PLAN NOTE
Intermittent episodes of choking-like sensation, hoarseness and loss of voice especially when laughing or talking rapidly. He does not have any difficulty swallowing food. He has occasional acid reflux. He does not take any medications at this point.   H

## 2021-10-14 NOTE — PROGRESS NOTES
HPI:    Patient ID: Marina Torres is a 65year old male. Labs completed in July looked normal.  Echocardiogram heart stable. 2D echo Doppler of the heart shows normal heart size.   The walls of the heart seems slightly thickened-mild LVH.   pumping c problems. Risk factors for coronary artery disease include dyslipidemia, hypertension and a sedentary lifestyle. Review of Systems   Constitutional: Negative. Negative for malaise/fatigue. HENT: Negative. Eyes: Negative.     Respiratory: Positi Pupils: Pupils are equal, round, and reactive to light. Neck:      Thyroid: No thyromegaly. Vascular: No JVD. Cardiovascular:      Rate and Rhythm: Normal rate and regular rhythm. Heart sounds: Normal heart sounds.    Pulmonary:      Effort: P atrial runs. He has not had any lightheadedness or dizziness. Kidney functions have been stable. 2D echo Doppler of the heart discussed.   Recheck labs as directed, restrict salt in the diet, keep well-hydrated and follow-up as discussed in about 4 month AND AGREES TO FOLLOW DIRECTIONS AND ADVICE    Orders Placed This Encounter      Comp Metabolic Panel (14)      CBC With Differential With Platelet      Hemoglobin A1C      Lipid Panel      Microalb/Creat Ratio, Random Urine      Urinalysis, Routine      Fl

## 2021-10-14 NOTE — ASSESSMENT & PLAN NOTE
2D echo Doppler of the heart looks stable. Pulmonary pressures at about 34. This was 30 prior to it. Patient remains asymptomatic. We will need to recheck this in about 1 year.

## 2021-10-14 NOTE — ASSESSMENT & PLAN NOTE
Patient has been on diet control with this time. He has lost some weight. He has been watching his diet carefully. Recheck hemoglobin A1c has directed with the next blood draw.

## 2021-11-03 DIAGNOSIS — I10 ESSENTIAL HYPERTENSION: ICD-10-CM

## 2021-11-03 RX ORDER — HYDROCHLOROTHIAZIDE 12.5 MG/1
12.5 CAPSULE, GELATIN COATED ORAL EVERY MORNING
Qty: 90 CAPSULE | Refills: 1 | Status: SHIPPED | OUTPATIENT
Start: 2021-11-03 | End: 2022-07-21

## 2021-11-03 NOTE — TELEPHONE ENCOUNTER
Refill passed per [facility] protocol. Requested Prescriptions   Pending Prescriptions Disp Refills    hydroCHLOROthiazide 12.5 MG Oral Cap 90 capsule 2     Sig: Take 1 capsule (12.5 mg total) by mouth every morning.         Hypertensive Medications Protocol Passed - 11/3/2021  2:35 PM        Passed - CMP or BMP in past 12 months        Passed - Appointment in past 6 or next 3 months        Passed - GFR  > 50     Lab Results   Component Value Date    GFRAA 79 07/14/2021                        Recent Outpatient Visits              2 weeks ago Essential hypertension    [facility], [address], Keven Loza MD    Office Visit    3 months ago Encounter for annual health examination    [facility], Keven Loza MD    Office Visit    12 months ago Screen for colon cancer    [facility], [address], [place], Leisa Camarillo MD    Office Visit    1 year ago Miki Stein annual wellness visit, subsequent    [facility], [address], Keven Loza MD    Office Visit    1 year ago [address], Judy Gutiérrez APRN    Virtual Phone E/M

## 2022-02-24 ENCOUNTER — MED REC SCAN ONLY (OUTPATIENT)
Dept: INTERNAL MEDICINE CLINIC | Facility: CLINIC | Age: 72
End: 2022-02-24

## 2022-04-25 PROBLEM — Z86.0101 HX OF ADENOMATOUS COLONIC POLYPS: Status: ACTIVE | Noted: 2020-12-01

## 2022-04-25 PROBLEM — Z86.010 HX OF ADENOMATOUS COLONIC POLYPS: Status: ACTIVE | Noted: 2020-12-01

## 2022-04-27 DIAGNOSIS — I10 ESSENTIAL HYPERTENSION: ICD-10-CM

## 2022-04-27 RX ORDER — OLMESARTAN MEDOXOMIL 20 MG/1
20 TABLET ORAL DAILY
Qty: 90 TABLET | Refills: 1 | OUTPATIENT
Start: 2022-04-27

## 2022-04-27 NOTE — TELEPHONE ENCOUNTER
Pt has appt tomorrow  Please review. Protocol failed/ No protocol      Requested Prescriptions   Pending Prescriptions Disp Refills    olmesartan 20 MG Oral Tab 90 tablet 1     Sig: Take 1 tablet (20 mg total) by mouth daily.         Hypertensive Medications Protocol Failed - 4/27/2022  2:39 PM        Failed - Appointment in past 6 or next 3 months        Passed - CMP or BMP in past 12 months        Passed - GFR  > 50     Lab Results   Component Value Date    GFRAA 79 07/14/2021                       Future Appointments         Provider Department Appt Notes    Tomorrow Alessio Emanuel, [facility], [facility] est care/medications, previous patient Dr. Yissel Guajardo Visits              6 months ago Essential hypertension    [place] [employer] [facility], [address], Tyler Bueno MD    Office Visit    9 months ago Encounter for annual health examination    [facility], Tyler Bueno MD    Office Visit    1 year ago Screen for colon cancer    [place] [employer] [facility], [address]  [facility] MD Navin    Office Visit    1 year ago Miki Stein annual wellness visit, subsequent    [place] [employer] [facility], [address], Tyler Bueno MD    Office Visit    1 year ago [address], Judy Gutiérrez APRN    Virtual Phone E/M

## 2022-04-27 NOTE — TELEPHONE ENCOUNTER
Patient requesting refill on the following medication. Patient is out of medication. Appointment is scheduled with Olesya Hernandez on 4/28.      olmesartan 20 MG Oral Tab

## 2022-04-28 ENCOUNTER — OFFICE VISIT (OUTPATIENT)
Dept: INTERNAL MEDICINE CLINIC | Facility: CLINIC | Age: 72
End: 2022-04-28
Payer: MEDICARE

## 2022-04-28 VITALS
BODY MASS INDEX: 32.1 KG/M2 | HEART RATE: 69 BPM | WEIGHT: 237 LBS | SYSTOLIC BLOOD PRESSURE: 148 MMHG | HEIGHT: 72 IN | TEMPERATURE: 97 F | DIASTOLIC BLOOD PRESSURE: 89 MMHG

## 2022-04-28 DIAGNOSIS — R09.89 RIGHT CAROTID BRUIT: ICD-10-CM

## 2022-04-28 DIAGNOSIS — I10 ESSENTIAL HYPERTENSION: ICD-10-CM

## 2022-04-28 DIAGNOSIS — R73.9 ELEVATED BLOOD SUGAR: Primary | ICD-10-CM

## 2022-04-28 DIAGNOSIS — R73.03 PREDIABETES: ICD-10-CM

## 2022-04-28 LAB
CARTRIDGE LOT#: ABNORMAL NUMERIC
HEMOGLOBIN A1C: 6.1 % (ref 4.3–5.6)

## 2022-04-28 PROCEDURE — 99214 OFFICE O/P EST MOD 30 MIN: CPT | Performed by: NURSE PRACTITIONER

## 2022-04-28 PROCEDURE — 3077F SYST BP >= 140 MM HG: CPT | Performed by: NURSE PRACTITIONER

## 2022-04-28 PROCEDURE — 83036 HEMOGLOBIN GLYCOSYLATED A1C: CPT | Performed by: NURSE PRACTITIONER

## 2022-04-28 PROCEDURE — 3079F DIAST BP 80-89 MM HG: CPT | Performed by: NURSE PRACTITIONER

## 2022-04-28 PROCEDURE — 3008F BODY MASS INDEX DOCD: CPT | Performed by: NURSE PRACTITIONER

## 2022-04-28 RX ORDER — OLMESARTAN MEDOXOMIL 20 MG/1
20 TABLET ORAL DAILY
Qty: 90 TABLET | Refills: 1 | Status: SHIPPED | OUTPATIENT
Start: 2022-04-28

## 2022-04-28 NOTE — ASSESSMENT & PLAN NOTE
Right-sided carotid bruit not heard on exam today. No weakness noted. Continue to monitor  Past note. Right-sided carotid bruit and very low intensity carotid pulsation. Carotid Dopplers have been obtained especially as patient did have an episode of transient weakness, numbness in the left upper extremity which has resolved. No residuals or focal neurological deficits at this time.

## 2022-04-28 NOTE — ASSESSMENT & PLAN NOTE
Patient has been on diet control with this time. He has lost some weight. He admits to have sweets sometimes. Recheck hemoglobin A1c today. 6.1. Annual lab in July. Plan  Discussed lifestyle modifications including reductions in dietary total and saturated fat, weight loss, aerobic exercise, and eating a diet rich in fruits and vegetables. Decrease sugar in his diet.

## 2022-07-05 ENCOUNTER — TELEPHONE (OUTPATIENT)
Dept: INTERNAL MEDICINE CLINIC | Facility: CLINIC | Age: 72
End: 2022-07-05

## 2022-07-21 ENCOUNTER — LAB ENCOUNTER (OUTPATIENT)
Dept: LAB | Facility: HOSPITAL | Age: 72
End: 2022-07-21
Attending: NURSE PRACTITIONER
Payer: MEDICARE

## 2022-07-21 ENCOUNTER — OFFICE VISIT (OUTPATIENT)
Dept: INTERNAL MEDICINE CLINIC | Facility: CLINIC | Age: 72
End: 2022-07-21
Payer: MEDICARE

## 2022-07-21 VITALS
SYSTOLIC BLOOD PRESSURE: 147 MMHG | HEART RATE: 76 BPM | DIASTOLIC BLOOD PRESSURE: 82 MMHG | WEIGHT: 235.69 LBS | BODY MASS INDEX: 31.92 KG/M2 | HEIGHT: 72 IN

## 2022-07-21 DIAGNOSIS — I10 ESSENTIAL HYPERTENSION: ICD-10-CM

## 2022-07-21 DIAGNOSIS — R73.9 ELEVATED BLOOD SUGAR: ICD-10-CM

## 2022-07-21 DIAGNOSIS — R73.03 PREDIABETES: Primary | ICD-10-CM

## 2022-07-21 DIAGNOSIS — R73.03 PREDIABETES: ICD-10-CM

## 2022-07-21 LAB
ALBUMIN SERPL-MCNC: 3.6 G/DL (ref 3.4–5)
ALBUMIN/GLOB SERPL: 0.9 {RATIO} (ref 1–2)
ALP LIVER SERPL-CCNC: 70 U/L
ALT SERPL-CCNC: 19 U/L
ANION GAP SERPL CALC-SCNC: 9 MMOL/L (ref 0–18)
AST SERPL-CCNC: 20 U/L (ref 15–37)
BASOPHILS # BLD AUTO: 0.06 X10(3) UL (ref 0–0.2)
BASOPHILS NFR BLD AUTO: 0.7 %
BILIRUB SERPL-MCNC: 0.6 MG/DL (ref 0.1–2)
BUN BLD-MCNC: 12 MG/DL (ref 7–18)
BUN/CREAT SERPL: 9.6 (ref 10–20)
CALCIUM BLD-MCNC: 9.3 MG/DL (ref 8.5–10.1)
CHLORIDE SERPL-SCNC: 106 MMOL/L (ref 98–112)
CHOLEST SERPL-MCNC: 166 MG/DL (ref ?–200)
CO2 SERPL-SCNC: 27 MMOL/L (ref 21–32)
COMPLEXED PSA SERPL-MCNC: 2.07 NG/ML (ref ?–4)
CREAT BLD-MCNC: 1.25 MG/DL
CREAT UR-SCNC: 159 MG/DL
DEPRECATED RDW RBC AUTO: 42.5 FL (ref 35.1–46.3)
EOSINOPHIL # BLD AUTO: 0.1 X10(3) UL (ref 0–0.7)
EOSINOPHIL NFR BLD AUTO: 1.2 %
ERYTHROCYTE [DISTWIDTH] IN BLOOD BY AUTOMATED COUNT: 13.5 % (ref 11–15)
EST. AVERAGE GLUCOSE BLD GHB EST-MCNC: 128 MG/DL (ref 68–126)
FASTING PATIENT LIPID ANSWER: YES
FASTING STATUS PATIENT QL REPORTED: YES
GLOBULIN PLAS-MCNC: 4 G/DL (ref 2.8–4.4)
GLUCOSE BLD-MCNC: 107 MG/DL (ref 70–99)
HBA1C MFR BLD: 6.1 % (ref ?–5.7)
HCT VFR BLD AUTO: 47.6 %
HDLC SERPL-MCNC: 39 MG/DL (ref 40–59)
HGB BLD-MCNC: 14.8 G/DL
IMM GRANULOCYTES # BLD AUTO: 0.02 X10(3) UL (ref 0–1)
IMM GRANULOCYTES NFR BLD: 0.2 %
LDLC SERPL CALC-MCNC: 100 MG/DL (ref ?–100)
LYMPHOCYTES # BLD AUTO: 2.98 X10(3) UL (ref 1–4)
LYMPHOCYTES NFR BLD AUTO: 35.9 %
MCH RBC QN AUTO: 26.7 PG (ref 26–34)
MCHC RBC AUTO-ENTMCNC: 31.1 G/DL (ref 31–37)
MCV RBC AUTO: 85.9 FL
MICROALBUMIN UR-MCNC: 0.73 MG/DL
MICROALBUMIN/CREAT 24H UR-RTO: 4.6 UG/MG (ref ?–30)
MONOCYTES # BLD AUTO: 0.89 X10(3) UL (ref 0.1–1)
MONOCYTES NFR BLD AUTO: 10.7 %
NEUTROPHILS # BLD AUTO: 4.25 X10 (3) UL (ref 1.5–7.7)
NEUTROPHILS # BLD AUTO: 4.25 X10(3) UL (ref 1.5–7.7)
NEUTROPHILS NFR BLD AUTO: 51.3 %
NONHDLC SERPL-MCNC: 127 MG/DL (ref ?–130)
OSMOLALITY SERPL CALC.SUM OF ELEC: 294 MOSM/KG (ref 275–295)
PLATELET # BLD AUTO: 302 10(3)UL (ref 150–450)
POTASSIUM SERPL-SCNC: 4 MMOL/L (ref 3.5–5.1)
PROT SERPL-MCNC: 7.6 G/DL (ref 6.4–8.2)
RBC # BLD AUTO: 5.54 X10(6)UL
SODIUM SERPL-SCNC: 142 MMOL/L (ref 136–145)
TRIGL SERPL-MCNC: 154 MG/DL (ref 30–149)
TSI SER-ACNC: 0.73 MIU/ML (ref 0.36–3.74)
VIT B12 SERPL-MCNC: 375 PG/ML (ref 193–986)
VIT D+METAB SERPL-MCNC: 18.2 NG/ML (ref 30–100)
VLDLC SERPL CALC-MCNC: 26 MG/DL (ref 0–30)
WBC # BLD AUTO: 8.3 X10(3) UL (ref 4–11)

## 2022-07-21 PROCEDURE — 85025 COMPLETE CBC W/AUTO DIFF WBC: CPT

## 2022-07-21 PROCEDURE — 3008F BODY MASS INDEX DOCD: CPT | Performed by: NURSE PRACTITIONER

## 2022-07-21 PROCEDURE — 82607 VITAMIN B-12: CPT

## 2022-07-21 PROCEDURE — 84443 ASSAY THYROID STIM HORMONE: CPT

## 2022-07-21 PROCEDURE — 80053 COMPREHEN METABOLIC PANEL: CPT

## 2022-07-21 PROCEDURE — 3077F SYST BP >= 140 MM HG: CPT | Performed by: NURSE PRACTITIONER

## 2022-07-21 PROCEDURE — 82306 VITAMIN D 25 HYDROXY: CPT

## 2022-07-21 PROCEDURE — 82043 UR ALBUMIN QUANTITATIVE: CPT

## 2022-07-21 PROCEDURE — 36415 COLL VENOUS BLD VENIPUNCTURE: CPT

## 2022-07-21 PROCEDURE — 99214 OFFICE O/P EST MOD 30 MIN: CPT | Performed by: NURSE PRACTITIONER

## 2022-07-21 PROCEDURE — 80061 LIPID PANEL: CPT

## 2022-07-21 PROCEDURE — 82570 ASSAY OF URINE CREATININE: CPT

## 2022-07-21 PROCEDURE — 83036 HEMOGLOBIN GLYCOSYLATED A1C: CPT

## 2022-07-21 PROCEDURE — 3079F DIAST BP 80-89 MM HG: CPT | Performed by: NURSE PRACTITIONER

## 2022-07-21 RX ORDER — HYDROCHLOROTHIAZIDE 25 MG/1
25 TABLET ORAL DAILY
Qty: 30 TABLET | Refills: 5 | Status: SHIPPED | OUTPATIENT
Start: 2022-07-21 | End: 2023-07-16

## 2022-07-21 RX ORDER — OLMESARTAN MEDOXOMIL 20 MG/1
20 TABLET ORAL DAILY
Qty: 90 TABLET | Refills: 1 | Status: SHIPPED | OUTPATIENT
Start: 2022-07-21

## 2022-07-21 RX ORDER — HYDROCHLOROTHIAZIDE 12.5 MG/1
12.5 CAPSULE, GELATIN COATED ORAL EVERY MORNING
Qty: 90 CAPSULE | Refills: 1 | Status: SHIPPED | OUTPATIENT
Start: 2022-07-21 | End: 2022-07-21

## 2022-07-21 NOTE — ASSESSMENT & PLAN NOTE
Blood pressure 147/82, pulse 76, height 6' (1.829 m), weight 235 lb 11.2 oz (106.9 kg). Patient's blood pressure is elevated. He admits to salting his eggs. Discussed the low sodium diet and references given on low sodium choices. We will increase his hydrochlorothiazide to 25 mg daily. Plan  Lab ordered. Increase hydrochlorothiazide to 25 mg daily.   Follow-up in 1 month  Low-salt choices  Pepper on eggs instead of salt

## 2022-09-07 ENCOUNTER — OFFICE VISIT (OUTPATIENT)
Dept: INTERNAL MEDICINE CLINIC | Facility: CLINIC | Age: 72
End: 2022-09-07
Payer: MEDICARE

## 2022-09-07 VITALS
BODY MASS INDEX: 31.83 KG/M2 | HEIGHT: 72 IN | WEIGHT: 235 LBS | HEART RATE: 71 BPM | SYSTOLIC BLOOD PRESSURE: 124 MMHG | DIASTOLIC BLOOD PRESSURE: 83 MMHG

## 2022-09-07 DIAGNOSIS — I10 ESSENTIAL HYPERTENSION: ICD-10-CM

## 2022-09-07 PROCEDURE — 3074F SYST BP LT 130 MM HG: CPT | Performed by: PHYSICIAN ASSISTANT

## 2022-09-07 PROCEDURE — 99213 OFFICE O/P EST LOW 20 MIN: CPT | Performed by: PHYSICIAN ASSISTANT

## 2022-09-07 PROCEDURE — 3008F BODY MASS INDEX DOCD: CPT | Performed by: PHYSICIAN ASSISTANT

## 2022-09-07 PROCEDURE — 3079F DIAST BP 80-89 MM HG: CPT | Performed by: PHYSICIAN ASSISTANT

## 2022-09-07 RX ORDER — HYDROCHLOROTHIAZIDE 25 MG/1
25 TABLET ORAL DAILY
Qty: 90 TABLET | Refills: 3 | Status: SHIPPED | OUTPATIENT
Start: 2022-09-07 | End: 2023-09-02

## 2022-09-07 RX ORDER — OLMESARTAN MEDOXOMIL 20 MG/1
20 TABLET ORAL DAILY
Qty: 90 TABLET | Refills: 3 | Status: SHIPPED | OUTPATIENT
Start: 2022-09-07

## 2022-11-11 ENCOUNTER — OFFICE VISIT (OUTPATIENT)
Dept: FAMILY MEDICINE CLINIC | Facility: CLINIC | Age: 72
End: 2022-11-11
Payer: MEDICARE

## 2022-11-11 ENCOUNTER — LAB ENCOUNTER (OUTPATIENT)
Dept: LAB | Age: 72
End: 2022-11-11
Attending: PHYSICIAN ASSISTANT
Payer: MEDICARE

## 2022-11-11 VITALS
SYSTOLIC BLOOD PRESSURE: 139 MMHG | WEIGHT: 237 LBS | HEART RATE: 73 BPM | BODY MASS INDEX: 32.1 KG/M2 | HEIGHT: 72 IN | DIASTOLIC BLOOD PRESSURE: 83 MMHG

## 2022-11-11 DIAGNOSIS — I10 ESSENTIAL HYPERTENSION: ICD-10-CM

## 2022-11-11 DIAGNOSIS — E55.9 VITAMIN D DEFICIENCY: ICD-10-CM

## 2022-11-11 DIAGNOSIS — K57.30 PANCOLONIC DIVERTICULOSIS: ICD-10-CM

## 2022-11-11 DIAGNOSIS — E78.5 DYSLIPIDEMIA: ICD-10-CM

## 2022-11-11 DIAGNOSIS — Z86.010 HX OF ADENOMATOUS COLONIC POLYPS: ICD-10-CM

## 2022-11-11 DIAGNOSIS — I70.0 AORTIC ATHEROSCLEROSIS (HCC): ICD-10-CM

## 2022-11-11 DIAGNOSIS — H43.393 VITREOUS FLOATERS OF BOTH EYES: ICD-10-CM

## 2022-11-11 DIAGNOSIS — Z00.00 ROUTINE GENERAL MEDICAL EXAMINATION AT A HEALTH CARE FACILITY: Primary | ICD-10-CM

## 2022-11-11 DIAGNOSIS — Z00.00 ROUTINE GENERAL MEDICAL EXAMINATION AT A HEALTH CARE FACILITY: ICD-10-CM

## 2022-11-11 DIAGNOSIS — H25.13 AGE-RELATED NUCLEAR CATARACT OF BOTH EYES: ICD-10-CM

## 2022-11-11 DIAGNOSIS — R73.03 PREDIABETES: ICD-10-CM

## 2022-11-11 LAB
CHOLEST SERPL-MCNC: 173 MG/DL (ref ?–200)
FASTING PATIENT LIPID ANSWER: YES
HDLC SERPL-MCNC: 37 MG/DL (ref 40–59)
LDLC SERPL CALC-MCNC: 103 MG/DL (ref ?–100)
NONHDLC SERPL-MCNC: 136 MG/DL (ref ?–130)
TRIGL SERPL-MCNC: 192 MG/DL (ref 30–149)
VIT D+METAB SERPL-MCNC: 33.3 NG/ML (ref 30–100)
VLDLC SERPL CALC-MCNC: 32 MG/DL (ref 0–30)

## 2022-11-11 PROCEDURE — 36415 COLL VENOUS BLD VENIPUNCTURE: CPT

## 2022-11-11 PROCEDURE — 82306 VITAMIN D 25 HYDROXY: CPT

## 2022-11-11 PROCEDURE — 80061 LIPID PANEL: CPT

## 2022-11-13 PROBLEM — H40.003 GLAUCOMA SUSPECT OF BOTH EYES: Status: RESOLVED | Noted: 2019-10-22 | Resolved: 2022-11-13

## 2022-11-13 PROBLEM — R09.89 RIGHT CAROTID BRUIT: Status: RESOLVED | Noted: 2019-10-16 | Resolved: 2022-11-13

## 2022-11-13 PROBLEM — I27.20 PULMONARY HTN (HCC): Status: RESOLVED | Noted: 2019-12-27 | Resolved: 2022-11-13

## 2023-04-13 ENCOUNTER — TELEPHONE (OUTPATIENT)
Dept: FAMILY MEDICINE CLINIC | Facility: CLINIC | Age: 73
End: 2023-04-13

## 2023-05-18 ENCOUNTER — TELEPHONE (OUTPATIENT)
Dept: FAMILY MEDICINE CLINIC | Facility: CLINIC | Age: 73
End: 2023-05-18

## 2023-07-21 ENCOUNTER — TELEPHONE (OUTPATIENT)
Dept: FAMILY MEDICINE CLINIC | Facility: CLINIC | Age: 73
End: 2023-07-21

## 2023-11-27 LAB — AMB EXT COVID-19 RESULT: DETECTED

## 2023-11-29 ENCOUNTER — TELEPHONE (OUTPATIENT)
Dept: FAMILY MEDICINE CLINIC | Facility: CLINIC | Age: 73
End: 2023-11-29

## 2023-11-29 NOTE — TELEPHONE ENCOUNTER
Pt wanted Dr to know   Pt tested positive Monday, s/s started Sunday -coughing, sneezing,chills,today -milder  s/s sneezing /runny nose, hoarse, no chills or fever , taking high BP med /halls-suppressed the cough    Advised - CDC guidelines - quarantine, hydration, treat s/s, ER red flags verbalized understanding

## 2024-02-08 ENCOUNTER — TELEPHONE (OUTPATIENT)
Dept: FAMILY MEDICINE CLINIC | Facility: CLINIC | Age: 74
End: 2024-02-08

## 2024-03-14 ENCOUNTER — TELEPHONE (OUTPATIENT)
Dept: FAMILY MEDICINE CLINIC | Facility: CLINIC | Age: 74
End: 2024-03-14

## 2024-03-14 NOTE — TELEPHONE ENCOUNTER
Patient scheduled a mychart appointment noting     Blood Pressure !    LMTCB and mychart message sent.

## 2024-03-14 NOTE — TELEPHONE ENCOUNTER
Patient called back to advise his blood pressure is fine he just didn't know what else to put in the comments. Patient apologizes for any confusion.

## 2024-03-18 DIAGNOSIS — I10 ESSENTIAL HYPERTENSION: ICD-10-CM

## 2024-03-18 NOTE — TELEPHONE ENCOUNTER
Last prescriptions provided appears to have run out in September of last year, he does not have any updated blood work.  He needs an appointment for further refills.  Please schedule him to be seen as soon as possible, he saw Dalton in the past

## 2024-03-18 NOTE — TELEPHONE ENCOUNTER
Please review.  Protocol failed / Has no protocol.    Asking for refill to get to appointment date:  Future Appointments   Date Time Provider Department Center   4/17/2024 10:30 AM Rasheeda Granados MD [facility] [place]     Previously signed by [place] PIERRE     Requested Prescriptions   Pending Prescriptions Disp Refills    olmesartan 20 MG Oral Tab 90 tablet 3     Sig: Take 1 tablet (20 mg total) by mouth daily.       Hypertension Medications Protocol Failed - 3/18/2024  9:35 AM        Failed - CMP or BMP in past 12 months        Failed - EGFRCR or GFRAA > 50     GFR Evaluation            Passed - Last BP reading less than 140/90     BP Readings from Last 1 Encounters:   11/11/22 139/83               Passed - In person appointment or virtual visit in the past 12 mos or appointment in next 3 mos     Recent Outpatient Visits              1 year ago Routine general medical examination at a health care facility    [facility], [address], [place] Chris Bender PA-C    Office Visit    1 year ago Essential hypertension    [facility] Gabbie Mead PA-C    [facility]    [facility]    [facility] Visit    1 year ago Elevated blood sugar    [facility] Judy Harris APRN    Office Visit    2 years ago Essential hypertension    [facility] Shanice Perez MD    Office Visit          Future Appointments         Provider Department Appt Notes    In 1 month Rasheeda Granados MD [facility], [address], [place] Annual                 hydroCHLOROthiazide 25 MG Oral Tab 90 tablet 3     Sig: Take 1 tablet (25 mg total) by mouth daily.       Hypertension Medications Protocol Failed - 3/18/2024  9:35 AM        Failed - CMP or BMP in past 12 months        Failed - EGFRCR  or GFRAA > 50     GFR Evaluation            Passed - Last BP reading less than 140/90     BP Readings from Last 1 Encounters:   11/11/22 139/83               Passed - In person appointment or virtual visit in the past 12 mos or appointment in next 3 mos     Recent Outpatient Visits              1 year ago Routine general medical examination at a health care facility    [facility], [address], [place] Chris Bender PA-C    Office Visit    1 year ago Essential hypertension    [facility], Gabbie Merida PA-C    Office Visit    1 year ago [place]    [facility] [place]    Office Visit    1 year ago [facility]    [facility] Visit    2 years ago Essential hypertension    [facility] [place]    Office Visit          Future Appointments         Provider Department Appt Notes    In 1 month Rasheeda Granados MD [facility], [address], [place] Annual

## 2024-03-18 NOTE — TELEPHONE ENCOUNTER
Pt needs two scripts to verified [facility] in [place]    Olmesartan 20mg   Hydrochlorothiazide 25 mg.    He is out of these    Pt has appt April 17th

## 2024-03-19 RX ORDER — OLMESARTAN MEDOXOMIL 20 MG/1
20 TABLET ORAL DAILY
Qty: 30 TABLET | Refills: 0 | Status: SHIPPED | OUTPATIENT
Start: 2024-03-19

## 2024-03-19 RX ORDER — HYDROCHLOROTHIAZIDE 25 MG/1
25 TABLET ORAL DAILY
Qty: 30 TABLET | Refills: 0 | Status: SHIPPED | OUTPATIENT
Start: 2024-03-19

## 2024-03-19 NOTE — TELEPHONE ENCOUNTER
Prescription has been sent for 1 month, please advise patient that no further refills will be provided until he is seen

## 2024-03-19 NOTE — TELEPHONE ENCOUNTER
Future Appointments   Date Time Provider Department Center   4/17/2024 10:30 AM Rasheeda Granados MD [facility] [place]

## 2024-04-17 ENCOUNTER — OFFICE VISIT (OUTPATIENT)
Dept: FAMILY MEDICINE CLINIC | Facility: CLINIC | Age: 74
End: 2024-04-17
Payer: MEDICARE

## 2024-04-17 ENCOUNTER — EKG ENCOUNTER (OUTPATIENT)
Dept: LAB | Age: 74
End: 2024-04-17
Attending: FAMILY MEDICINE
Payer: MEDICARE

## 2024-04-17 ENCOUNTER — LAB ENCOUNTER (OUTPATIENT)
Dept: LAB | Age: 74
End: 2024-04-17
Attending: FAMILY MEDICINE
Payer: MEDICARE

## 2024-04-17 VITALS
HEART RATE: 63 BPM | WEIGHT: 238 LBS | SYSTOLIC BLOOD PRESSURE: 137 MMHG | HEIGHT: 72 IN | RESPIRATION RATE: 17 BRPM | DIASTOLIC BLOOD PRESSURE: 82 MMHG | BODY MASS INDEX: 32.23 KG/M2

## 2024-04-17 DIAGNOSIS — Z00.00 ENCOUNTER FOR ANNUAL HEALTH EXAMINATION: Primary | ICD-10-CM

## 2024-04-17 DIAGNOSIS — Z12.5 SCREENING FOR PROSTATE CANCER: ICD-10-CM

## 2024-04-17 DIAGNOSIS — E78.5 DYSLIPIDEMIA: ICD-10-CM

## 2024-04-17 DIAGNOSIS — R32 URINARY INCONTINENCE, UNSPECIFIED TYPE: ICD-10-CM

## 2024-04-17 DIAGNOSIS — R73.03 PREDIABETES: ICD-10-CM

## 2024-04-17 DIAGNOSIS — I70.0 AORTIC ATHEROSCLEROSIS (HCC): ICD-10-CM

## 2024-04-17 DIAGNOSIS — I45.10 RBBB: ICD-10-CM

## 2024-04-17 DIAGNOSIS — I10 ESSENTIAL HYPERTENSION: ICD-10-CM

## 2024-04-17 DIAGNOSIS — H25.13 AGE-RELATED NUCLEAR CATARACT OF BOTH EYES: ICD-10-CM

## 2024-04-17 DIAGNOSIS — R07.89 CHEST PRESSURE: ICD-10-CM

## 2024-04-17 PROBLEM — N18.30 CKD (CHRONIC KIDNEY DISEASE) STAGE 3, GFR 30-59 ML/MIN (HCC): Chronic | Status: RESOLVED | Noted: 2024-04-17 | Resolved: 2024-04-17

## 2024-04-17 PROBLEM — N18.30 CKD (CHRONIC KIDNEY DISEASE) STAGE 3, GFR 30-59 ML/MIN (HCC): Chronic | Status: ACTIVE | Noted: 2024-04-17

## 2024-04-17 LAB
ALBUMIN SERPL-MCNC: 4.2 G/DL (ref 3.2–4.8)
ALBUMIN/GLOB SERPL: 1.4 {RATIO} (ref 1–2)
ALP LIVER SERPL-CCNC: 59 U/L
ALT SERPL-CCNC: 9 U/L
ANION GAP SERPL CALC-SCNC: 6 MMOL/L (ref 0–18)
AST SERPL-CCNC: 20 U/L (ref ?–34)
ATRIAL RATE: 55 BPM
BILIRUB SERPL-MCNC: 0.5 MG/DL (ref 0.2–1.1)
BUN BLD-MCNC: 16 MG/DL (ref 9–23)
BUN/CREAT SERPL: 12.7 (ref 10–20)
CALCIUM BLD-MCNC: 9.7 MG/DL (ref 8.7–10.4)
CHLORIDE SERPL-SCNC: 105 MMOL/L (ref 98–112)
CHOLEST SERPL-MCNC: 167 MG/DL (ref ?–200)
CO2 SERPL-SCNC: 30 MMOL/L (ref 21–32)
COMPLEXED PSA SERPL-MCNC: 8.17 NG/ML (ref ?–4)
CREAT BLD-MCNC: 1.26 MG/DL
EGFRCR SERPLBLD CKD-EPI 2021: 59 ML/MIN/1.73M2 (ref 60–?)
EST. AVERAGE GLUCOSE BLD GHB EST-MCNC: 140 MG/DL (ref 68–126)
FASTING PATIENT LIPID ANSWER: YES
FASTING STATUS PATIENT QL REPORTED: YES
GLOBULIN PLAS-MCNC: 3 G/DL (ref 2.8–4.4)
GLUCOSE BLD-MCNC: 100 MG/DL (ref 70–99)
HBA1C MFR BLD: 6.5 % (ref ?–5.7)
HDLC SERPL-MCNC: 36 MG/DL (ref 40–59)
LDLC SERPL CALC-MCNC: 106 MG/DL (ref ?–100)
NONHDLC SERPL-MCNC: 131 MG/DL (ref ?–130)
OSMOLALITY SERPL CALC.SUM OF ELEC: 293 MOSM/KG (ref 275–295)
P AXIS: 49 DEGREES
P-R INTERVAL: 176 MS
POTASSIUM SERPL-SCNC: 3.9 MMOL/L (ref 3.5–5.1)
PROT SERPL-MCNC: 7.2 G/DL (ref 5.7–8.2)
Q-T INTERVAL: 474 MS
QRS DURATION: 132 MS
QTC CALCULATION (BEZET): 453 MS
R AXIS: 23 DEGREES
SODIUM SERPL-SCNC: 141 MMOL/L (ref 136–145)
T AXIS: 19 DEGREES
TRIGL SERPL-MCNC: 139 MG/DL (ref 30–149)
VENTRICULAR RATE: 55 BPM
VLDLC SERPL CALC-MCNC: 24 MG/DL (ref 0–30)

## 2024-04-17 PROCEDURE — 93010 ELECTROCARDIOGRAM REPORT: CPT | Performed by: INTERNAL MEDICINE

## 2024-04-17 PROCEDURE — 93005 ELECTROCARDIOGRAM TRACING: CPT

## 2024-04-17 PROCEDURE — 36415 COLL VENOUS BLD VENIPUNCTURE: CPT

## 2024-04-17 PROCEDURE — 80061 LIPID PANEL: CPT

## 2024-04-17 PROCEDURE — 83036 HEMOGLOBIN GLYCOSYLATED A1C: CPT

## 2024-04-17 PROCEDURE — 80053 COMPREHEN METABOLIC PANEL: CPT

## 2024-04-17 RX ORDER — HYDROCHLOROTHIAZIDE 25 MG/1
25 TABLET ORAL DAILY
Qty: 90 TABLET | Refills: 3 | Status: SHIPPED | OUTPATIENT
Start: 2024-04-17

## 2024-04-17 RX ORDER — OLMESARTAN MEDOXOMIL 20 MG/1
20 TABLET ORAL DAILY
Qty: 90 TABLET | Refills: 3 | Status: SHIPPED | OUTPATIENT
Start: 2024-04-17

## 2024-04-17 NOTE — PATIENT INSTRUCTIONS
Sin Rao's SCREENING SCHEDULE   Tests on this list are recommended by your physician but may not be covered, or covered at this frequency, by your insurer.   Please check with your insurance carrier before scheduling to verify coverage.   PREVENTATIVE SERVICES FREQUENCY &  COVERAGE DETAILS LAST COMPLETION DATE   Diabetes Screening    Fasting Blood Sugar / Glucose    One screening every 12 months if never tested or if previously tested but not diagnosed with pre-diabetes   One screening every 6 months if diagnosed with pre-diabetes Lab Results   Component Value Date     (H) 07/21/2022        Cardiovascular Disease Screening    Lipid Panel  Cholesterol  Lipoprotein (HDL)  Triglycerides Covered every 5 years for all Medicare beneficiaries without apparent signs or symptoms of cardiovascular disease Lab Results   Component Value Date    CHOLEST 173 11/11/2022    HDL 37 (L) 11/11/2022     (H) 11/11/2022    TRIG 192 (H) 11/11/2022         Electrocardiogram (EKG)   Covered if needed at Welcome to Medicare, and non-screening if indicated for medical reasons -      Ultrasound Screening for Abdominal Aortic Aneurysm (AAA) Covered once in a lifetime for one of the following risk factors   • Men who are 65-75 years old and have ever smoked   • Anyone with a family history -     Colorectal Cancer Screening  Covered for ages 50-85; only need ONE of the following:    Colonoscopy   Covered every 10 years    Covered every 2 years if patient is at high risk or previous colonoscopy was abnormal 12/14/2020    Health Maintenance   Topic Date Due   • Colorectal Cancer Screening  12/14/2025       Flexible Sigmoidoscopy   Covered every 4 years -    Fecal Occult Blood Test Covered annually -   Prostate Cancer Screening    Prostate-Specific Antigen (PSA) Annually Lab Results   Component Value Date    PSA 0.5 12/27/2018     Health Maintenance   Topic Date Due   • PSA  07/21/2024      Immunizations    Influenza Covered once  per flu season  Please get every year 10/14/2023  No recommendations at this time    Pneumococcal Each vaccine ([id number] & [id number]) covered once after 65 Prevnar 13: 01/02/2017    Zqhkdiknl38: 10/16/2019     No recommendations at this time    Hepatitis B One screening covered for patients with certain risk factors   -  No recommendations at this time    Tetanus Toxoid Not covered by Medicare Part B unless medically necessary (cut with metal); may be covered with your pharmacy prescription benefits -    Tetanus, Diptheria and Pertusis TD and TDaP Not covered by Medicare Part B -  No recommendations at this time    Zoster Not covered by Medicare Part B; may be covered with your pharmacy  prescription benefits -  Zoster Vaccines(1 of 2) Never done     Annual Monitoring of Persistent Medications (ACE/ARB, digoxin diuretics, anticonvulsants)    Potassium Annually Lab Results   Component Value Date    K 4.0 07/21/2022         Creatinine   Annually Lab Results   Component Value Date    CREATSERUM 1.25 07/21/2022         BUN Annually Lab Results   Component Value Date    BUN 12 07/21/2022       Drug Serum Conc Annually No results found for: \"DIGOXIN\", \"DIG\", \"VALP\"

## 2024-04-17 NOTE — PROGRESS NOTES
Subjective:   Sin Rao is a 73 year old male who presents for a MA (Medicare Advantage) Supervisit (Once per calendar year) and scheduled follow up of multiple significant but stable problems and acute uncomplicated problem.   Patient is compliant with blood pressure medication, he reports that he has not been taking his statin as he developed myalgias and he did not like the way that he felt with medication.  Upon questioning he has prior history of lower extremity edema that has been well-controlled with the use of hydrochlorothiazide, he does report decreased exercise tolerance, also reports intermittent episodes of chest pressure and palpitations.  He is asymptomatic at time of exam.  He also has prior history of cataract surgery has not had any follow-up with ophthalmology and is requesting referral.    Patient also has noted that he is struggling with urination, he has to push at the end of urination to complete emptying otherwise he will have dripping after void    History/Other:   Fall Risk Assessment:   He has been screened for Falls and is low risk.      Cognitive Assessment:   He had a completely normal cognitive assessment - see flowsheet entries     Functional Ability/Status:   Sin Rao has a completely normal functional assessment. See flowsheet for details.      Depression Screening (PHQ-2/PHQ-9): PHQ-2 SCORE: 0  , done 4/17/2024        Advanced Directives:   He does have a Living Will but we do NOT have it on file in Epic.    He does have a POA but we do NOT have it on file in "[employer].    Patient has Advance Care Planning documents but we do not have a copy in EMR. Discussed Advanced Care Planning with patient and instructed patient to get our office a copy to be scanned into EMR.      Patient Active Problem List   Diagnosis    Essential hypertension    Dyslipidemia    Prediabetes    Age-related nuclear cataract of both eyes    Vitreous floaters of both eyes    Aortic atherosclerosis (HCC)     Hx of adenomatous colonic polyps    Pancolonic diverticulosis     Allergies:  He has No Known Allergies.    Current Medications:  Outpatient Medications Marked as Taking for the 4/17/24 encounter (Office Visit) with Rasheeda Granados MD   Medication Sig    hydroCHLOROthiazide 25 MG Oral Tab Take 1 tablet (25 mg total) by mouth daily.    olmesartan 20 MG Oral Tab Take 1 tablet (20 mg total) by mouth daily.    aspirin 81 MG Oral Tab Take 1 tablet (81 mg total) by mouth daily.       Medical History:  He  has a past medical history of Age-related nuclear cataract of both eyes (10/22/2019), Aortic atherosclerosis (HCC), Dyslipidemia, Essential hypertension, Glaucoma suspect of both eyes (10/22/2019), adenomatous colonic polyps (12/2020), Pancolonic diverticulosis, Prediabetes, Pulmonary hypertension (HCC), and Vitreous floaters of both eyes (10/22/2019).  Surgical History:  He  has a past surgical history that includes hernia surgery (2008, 2010).   Family History:  His family history includes Heart Disorder in his brother; Hypertension in his brother, brother, mother, and sister.  Social History:  He  reports that he has never smoked. He has never used smokeless tobacco. He reports current alcohol use. He reports that he does not use drugs.    Tobacco:  He has never smoked tobacco.    CAGE Alcohol Screen:   CAGE screening score of 0 on 4/17/2024, showing low risk of alcohol abuse.      Patient Care Team:  Rasheeda Granados MD as PCP - General (Family Medicine)  Judy Harris APRN as Nurse Practitioner (Nurse Practitioner)    Review of Systems   Constitutional: Negative.    Respiratory:  Positive for chest tightness.    Cardiovascular:  Positive for palpitations and leg swelling.   Gastrointestinal: Negative.    Genitourinary:  Positive for decreased urine volume and enuresis.   Skin: Negative.    Neurological: Negative.          Objective:   Physical Exam  Vitals and nursing note reviewed.    Constitutional:       Appearance: He is well-developed.   Cardiovascular:      Rate and Rhythm: Normal rate and regular rhythm.      Heart sounds: Normal heart sounds.   Pulmonary:      Effort: Pulmonary effort is normal.      Breath sounds: Normal breath sounds.   Abdominal:      General: Bowel sounds are normal.      Palpations: Abdomen is soft.   Musculoskeletal:      Right lower leg: No edema.      Left lower leg: No edema.   Skin:     General: Skin is warm and dry.   Neurological:      Mental Status: He is alert and oriented to person, place, and time.      Deep Tendon Reflexes: Reflexes are normal and symmetric.         /82   Pulse 63   Resp 17   Ht 6' (1.829 m)   Wt 238 lb (108 kg)   BMI 32.28 kg/m²  Estimated body mass index is 32.28 kg/m² as calculated from the following:    Height as of this encounter: 6' (1.829 m).    Weight as of this encounter: 238 lb (108 kg).    Medicare Hearing Assessment:   Hearing Screening    Time taken: 4/17/2024 10:23 AM  Entry User: Corinne Palencia CMA  Screening Method: Finger Rub  Finger Rub Result: Pass         Visual Acuity:   Right Eye Visual Acuity: Uncorrected Right Eye Chart Acuity: 20/20   Left Eye Visual Acuity: Uncorrected Left Eye Chart Acuity: 20/20   Both Eyes Visual Acuity: Uncorrected Both Eyes Chart Acuity: 20/20   Able To Tolerate Visual Acuity: Yes        Assessment & Plan:   Sin Rao is a 73 year old male who presents for a Medicare Assessment.     1. Encounter for annual health examination (Primary)  2. Essential hypertension  Blood pressure is well-controlled current regimen, continue present treatment  -     hydroCHLOROthiazide; Take 1 tablet (25 mg total) by mouth daily.  Dispense: 90 tablet; Refill: 3  -     Olmesartan Medoxomil; Take 1 tablet (20 mg total) by mouth daily.  Dispense: 90 tablet; Refill: 3  -     Detailed, Mod Complex (05947)  -     Comp Metabolic Panel (14); Future; Expected date: 04/17/2024  3. Aortic atherosclerosis  (HCC)  Off statin due to medication tolerance, follow-up lipid panel ordered  Overview:  CXR 12-19    Orders:  -     Detailed, Mod Complex (92617)  -     Lipid Panel; Future; Expected date: 04/17/2024  4. Dyslipidemia  Off statin due to medication tolerance, follow-up lipid panel  -     Detailed, Mod Complex (03086)  5. Prediabetes  Follow-up labs ordered  Overview:  Body mass index is 32.46 kg/m².   Orders:  -     Detailed, Mod Complex (69946)  -     Hemoglobin A1C; Future; Expected date: 04/17/2024  6. Chest pressure  With decreased exercise tolerance and prior history aortic atherosclerosis, will complete imaging and testing as noted below  -     EKG 12 Lead; Future; Expected date: 04/17/2024  -     CARD ECHO DOBUTAMINE STRESS (CPT=93350/17484); Future; Expected date: 04/17/2024  -     Detailed, Mod Complex (87436)  7. Age-related nuclear cataract of both eyes  Follow-up referral provided  -     Ophthalmology Referral - In Network  -     Detailed, Mod Complex (30433)  8. Urinary incontinence, unspecified type  Symptoms have been worsening, referral to urology provided, may consider adjustment in diuretic  -     Urology Referral - In Network  -     Detailed, Mod Complex (52493)  -     PSA Total, Screen; Future; Expected date: 04/17/2024  9. Screening for prostate cancer  -     PSA Total, Screen; Future; Expected date: 04/17/2024    The patient indicates understanding of these issues and agrees to the plan.  Consult ordered.  Continue with current treatment plan.  Further testing ordered.  Lab work ordered.  Reinforced healthy diet, lifestyle, and exercise.      Return in about 3 months (around 7/17/2024).     Rasheeda Boss MD, 4/17/2024     Supplementary Documentation:   General Health:  In the past six months, have you lost more than 10 pounds without trying?: 2 - No  Has your appetite been poor?: No  Type of Diet: Balanced  How does the patient maintain a good energy level?: Daily Walks  How would you  describe your daily physical activity?: Moderate  How would you describe your current health state?: Good  How do you maintain positive mental well-being?: Social Interaction;Visiting Family  On a scale of 0 to 10, with 0 being no pain and 10 being severe pain, what is your pain level?: 0 - (None)  In the past six months, have you experienced urine leakage?: 1-Yes  At any time do you feel concerned for the safety/well-being of yourself and/or your children, in your home or elsewhere?: No  Have you had any immunizations at another office such as Influenza, Hepatitis B, Tetanus, or Pneumococcal?: No        Sin Rao's SCREENING SCHEDULE   Tests on this list are recommended by your physician but may not be covered, or covered at this frequency, by your insurer.   Please check with your insurance carrier before scheduling to verify coverage.   PREVENTATIVE SERVICES FREQUENCY &  COVERAGE DETAILS LAST COMPLETION DATE   Diabetes Screening    Fasting Blood Sugar / Glucose    One screening every 12 months if never tested or if previously tested but not diagnosed with pre-diabetes   One screening every 6 months if diagnosed with pre-diabetes Lab Results   Component Value Date     (H) 07/21/2022        Cardiovascular Disease Screening    Lipid Panel  Cholesterol  Lipoprotein (HDL)  Triglycerides Covered every 5 years for all Medicare beneficiaries without apparent signs or symptoms of cardiovascular disease Lab Results   Component Value Date    CHOLEST 173 11/11/2022    HDL 37 (L) 11/11/2022     (H) 11/11/2022    TRIG 192 (H) 11/11/2022         Electrocardiogram (EKG)   Covered if needed at Welcome to Medicare, and non-screening if indicated for medical reasons -      Ultrasound Screening for Abdominal Aortic Aneurysm (AAA) Covered once in a lifetime for one of the following risk factors    Men who are 65-75 years old and have ever smoked    Anyone with a family history -     Colorectal Cancer  Screening  Covered for ages 50-85; only need ONE of the following:    Colonoscopy   Covered every 10 years    Covered every 2 years if patient is at high risk or previous colonoscopy was abnormal 12/14/2020    Health Maintenance   Topic Date Due    Colorectal Cancer Screening  12/14/2025       Flexible Sigmoidoscopy   Covered every 4 years -    Fecal Occult Blood Test Covered annually -   Prostate Cancer Screening    Prostate-Specific Antigen (PSA) Annually Lab Results   Component Value Date    PSA 0.5 12/27/2018     Health Maintenance   Topic Date Due    PSA  07/21/2024      Immunizations    Influenza Covered once per flu season  Please get every year 10/14/2023  No recommendations at this time    Pneumococcal Each vaccine (Rfmqfbv11 & [id number]) covered once after 65 Prevnar 13: 01/02/2017    Aioxvqoun43: 10/16/2019     No recommendations at this time    Hepatitis B One screening covered for patients with certain risk factors   -  No recommendations at this time    Tetanus Toxoid Not covered by Medicare Part B unless medically necessary (cut with metal); may be covered with your pharmacy prescription benefits -    Tetanus, Diptheria and Pertusis TD and TDaP Not covered by Medicare Part B -  No recommendations at this time    Zoster Not covered by Medicare Part B; may be covered with your pharmacy  prescription benefits -  Zoster Vaccines(1 of 2) Never done     Annual Monitoring of Persistent Medications (ACE/ARB, digoxin diuretics, anticonvulsants)    Potassium Annually Lab Results   Component Value Date    K 4.0 07/21/2022         Creatinine   Annually Lab Results   Component Value Date    CREATSERUM 1.25 07/21/2022         BUN Annually Lab Results   Component Value Date    BUN 12 07/21/2022       Drug Serum Conc Annually No results found for: \"DIGOXIN\", \"DIG\", \"VALP\"

## 2024-04-18 RX ORDER — ATORVASTATIN CALCIUM 10 MG/1
10 TABLET, FILM COATED ORAL NIGHTLY
Qty: 90 TABLET | Refills: 0 | Status: SHIPPED | OUTPATIENT
Start: 2024-04-18

## 2024-05-21 ENCOUNTER — OFFICE VISIT (OUTPATIENT)
Dept: SURGERY | Facility: CLINIC | Age: 74
End: 2024-05-21

## 2024-05-21 DIAGNOSIS — R97.20 ELEVATED PSA: Primary | ICD-10-CM

## 2024-05-21 LAB
BILIRUB UR QL: NEGATIVE
COLOR UR: YELLOW
GLUCOSE UR-MCNC: NORMAL MG/DL
KETONES UR-MCNC: NEGATIVE MG/DL
LEUKOCYTE ESTERASE UR QL STRIP.AUTO: NEGATIVE
NITRITE UR QL STRIP.AUTO: NEGATIVE
PH UR: 5.5 [PH] (ref 5–8)
RBC #/AREA URNS AUTO: >10 /HPF
SP GR UR STRIP: 1.02 (ref 1–1.03)
UROBILINOGEN UR STRIP-ACNC: NORMAL

## 2024-05-21 PROCEDURE — 1160F RVW MEDS BY RX/DR IN RCRD: CPT | Performed by: UROLOGY

## 2024-05-21 PROCEDURE — 1159F MED LIST DOCD IN RCRD: CPT | Performed by: UROLOGY

## 2024-05-21 PROCEDURE — 99204 OFFICE O/P NEW MOD 45 MIN: CPT | Performed by: UROLOGY

## 2024-05-21 RX ORDER — TAMSULOSIN HYDROCHLORIDE 0.4 MG/1
0.4 CAPSULE ORAL DAILY
Qty: 90 CAPSULE | Refills: 3 | Status: SHIPPED | OUTPATIENT
Start: 2024-05-21 | End: 2025-05-16

## 2024-05-21 NOTE — PROGRESS NOTES
Amira Ko MD  Department of Urology  [address]  [place] [address]    T: [phone]  F: [phone]    To: Rasheeda Boss MD   [address] [facility] [address]  [place] [address]    Re: Sin Rao   MRN: [id number]  : 11/10/1950    Dear Rasheeda Boss MD,    Today I had the pleasure of seeing Sin Rao in my clinic. As you know, Mr. Rao is a pleasant 73 year old year old male who I am seeing for elevated PSA and LUTS. Patient was last seen in this department on Visit date not found.    Briefly, patient states that he has urgency and frequency.  He feels that this may be related to his hydrochlorothiazide.    He also has an isolated elevated PSA that was checked 1 month ago to 8.17.  He has never had a previous elevated PSA       PAST MEDICAL HISTORY:  Past Medical History:    Age-related nuclear cataract of both eyes    Aortic atherosclerosis (HCC)    CXR 12-19    Dyslipidemia    Essential hypertension    Glaucoma suspect of both eyes    Hx of adenomatous colonic polyps    polyps removed, patient can repeat in 5 years IF remains in good health/chooses to continue screening    Pancolonic diverticulosis    Colonoscopy 12-20    Prediabetes    Pulmonary hypertension (HCC)    Echo 8-21 with mild LVH, EF 65-70%, mild AI, PAP 34 mmHg    Vitreous floaters of both eyes        PAST SURGICAL HISTORY:  Past Surgical History:   Procedure Laterality Date    Hernia surgery  ,          ALLERGIES:  No Known Allergies      MEDICATIONS:  Current Outpatient Medications   Medication Instructions    aspirin 81 mg, Oral, Daily    atorvastatin (LIPITOR) 10 mg, Oral, Nightly    hydroCHLOROthiazide 25 mg, Oral, Daily    olmesartan (BENICAR) 20 mg, Oral, Daily    tamsulosin (FLOMAX) 0.4 mg, Oral, Daily, Take 1/2 hour following the same meal each day        FAMILY HISTORY:  Family History   Problem Relation Age of Onset    Hypertension Mother     Hypertension Sister      Hypertension Brother     Hypertension Brother     Heart Disorder Brother     Diabetes Neg     Glaucoma Neg     Macular degeneration Neg         SOCIAL HISTORY:  Social History     Socioeconomic History    Marital status: Single   Tobacco Use    Smoking status: Never    Smokeless tobacco: Never   Vaping Use    Vaping status: Never Used   Substance and Sexual Activity    Alcohol use: Yes     Comment: 1x/month    Drug use: No          PHYSICAL EXAMINATION:  There were no vitals filed for this visit.  CONSTITUTIONAL: No apparent distress, cooperative and communicative  NEUROLOGIC: Alert and oriented   HEAD: Normocephalic, atraumatic   EYES: Sclera non-icteric   ENT: Hearing intact, moist mucous membranes   NECK: No obvious goiter or masses   RESPIRATORY: Normal respiratory effort, Nonlabored breathing on room air  SKIN: No evident rashes   ABDOMEN: Soft, nontender, nondistended, no rebound tenderness, no guarding, no masses  HOMERO: next visit    REVIEW OF SYSTEMS:    A comprehensive 10-point review of systems was completed.  Pertinent positives and negatives are noted in the the HPI.       LABORATORY DATA:  Component      Latest Ref Rng 12/27/2018 10/31/2019 7/14/2021 7/21/2022 4/17/2024   PSA      0.0 - 4.0 ng/mL 0.5        PSA Screen      <=4.00   0.59  0.82  2.07  8.17 (H)       Legend:  (H) High     HgbA1C  <5.7 % 6.5 High    Comment:  Normal HbA1C:     <5.7%   Pre-Diabetic:     5.7 - 6.4%   Diabetic:         >6.4%   Diabetic Control: <7.0%     Estimated Average Glucose  68 - 126 mg/dL 140 High          Component  Ref Range & Units 4/17/24 11:27 AM   Cholesterol, Total  <200 mg/dL 167   Comment: Desirable  <200 mg/dL  Borderline  200-239 mg/dL  High      >=240 mg/dL     HDL Cholesterol  40 - 59 mg/dL 36 Low    Comment: Interpretive Information:  An HDL cholesterol <40 mg/dL is low and constitutes a coronary heart disease risk factor. An HDL cholesterol >60 mg/dL is a negative risk factor for coronary heart disease.      Triglycerides  30 - 149 mg/dL 139   Comment: Reference interval for fasting triglycerides  Desirable: <150 mg/dL  Borderline: 150-199 mg/dL  High: 200-499 mg/dL  Very High: >=500 mg/dL       LDL Cholesterol  <100 mg/dL 106 High    Comment: Optimal            <100 mg/dL  Near/Above OptimaL 100-129 mg/dL  Borderline High    130-159 mg/dL  High               160-189 mg/dL    Very High          >190 mg/dL     VLDL  0 - 30 mg/dL 24   Non HDL Chol  <130 mg/dL 131 High    Comment: Desirable  <130 mg/dL  Borderline  130-159 mg/dL  High        160-189 mg/dL      Very high >=190 mg/dL     Patient Fasting for Lipid? Yes           IMAGING REVIEW:  PROCEDURE: XR CHEST PA + LAT CHEST (CPT=71020)     COMPARISON: None.     INDICATIONS: Cough x 1 month.  History of hypertension.     TECHNIQUE:   Two views.       FINDINGS:  CARDIAC/VASC: No cardiac silhouette abnormality or cardiomegaly.  Unremarkable pulmonary vasculature.    MEDIAST/MELLISSA: Atherosclerotic aorta with no visible aneurysm.    LUNGS/PLEURA: No significant pulmonary parenchymal abnormalities.  No effusion or pleural thickening.    BONES: Mild degenerative disc disease and spondylosis without visible acute abnormalities.    OTHER: Negative.                Impression  CONCLUSION: No acute cardiopulmonary abnormality.     Dictated by (CST): Nikko Weinstein MD on 12/27/2019 at 15:47      Approved by (CST): Nikko Weinstein MD on 12/27/2019 at 15:48           OTHER RELEVANT DATA:   none     IMPRESSION: 1.  Elevated PSA on isolated occasion-recommended repeat check of PSA.    We discussed reasons for PSA elevation including enlarged prostate, \"prostate jostling\", recent ejaculation x72 hours prior to PSA collection, UTI and malignancy.     Discussed risks of biopsy which include bleeding (hematospermia, hematochezia and hematuria), infection, sepsis, temporary erectile dysfunction, pelvic pain and possible death from infectious complications.     He knows that MRI is not a direct  substitute for biopsy and even if negative does not rule out malignancy. It would help with targeting lesions during biopsy or potentially delaying biopsy pending psas. IT is a decision making tool.    2.  Lower urinary tract symptoms namely urgency and frequency-recommended behavioral management and trial of tamsulosin 0.4 mg nightly.    Behavioral management with timed voiding, double voiding, avoiding bladder irritants (coffee, tea, soda/pop, alcohol), voiding prior to bedtime, avoiding fluids 2-4 hours prior to bedtime, compression stockings and elevation of feet          PLAN:  Behavioral management  Tamsulosin 0.4 mg nightly-she knows that this may take 3 months to have the desired effect  UA reflex to culture  PSA in 2 weeks  Follow-up after PSA    Thank you for referring this very pleasant patient to my clinic. If you have any questions or concerns, please do not hesitate to contact me.    Sincerely,  Amira Ko MD    30 minutes were spent on this patient at this visit obtaining a history, reviewing medical records, developing a treatment plan, counseling and discussing treatment strategy with patient, coordination of care and documentation.     The 21st Century Cures Act makes medical notes available to patients in the interest of transparency.  However, please be advised that this is a medical document.  It is intended as a peer to peer communication.  It is written in medical language and may contain abbreviations or verbiage that are technical and unfamiliar.  It may appear blunt or direct.  Medical documents are intended to carry relevant information, facts as evident, and the clinical opinion of the practitioner.

## 2024-06-03 ENCOUNTER — LAB ENCOUNTER (OUTPATIENT)
Dept: LAB | Facility: HOSPITAL | Age: 74
End: 2024-06-03
Attending: UROLOGY
Payer: MEDICARE

## 2024-06-03 DIAGNOSIS — R97.20 ELEVATED PSA: ICD-10-CM

## 2024-06-03 LAB — PSA SERPL-MCNC: 8.69 NG/ML (ref ?–4)

## 2024-06-03 PROCEDURE — 84153 ASSAY OF PSA TOTAL: CPT

## 2024-06-03 PROCEDURE — 36415 COLL VENOUS BLD VENIPUNCTURE: CPT

## 2024-06-04 ENCOUNTER — OFFICE VISIT (OUTPATIENT)
Dept: SURGERY | Facility: CLINIC | Age: 74
End: 2024-06-04
Payer: MEDICARE

## 2024-06-04 DIAGNOSIS — R97.20 ELEVATED PSA: ICD-10-CM

## 2024-06-04 DIAGNOSIS — R31.29 MICROHEMATURIA: Primary | ICD-10-CM

## 2024-06-04 PROCEDURE — 1159F MED LIST DOCD IN RCRD: CPT | Performed by: UROLOGY

## 2024-06-04 PROCEDURE — 99213 OFFICE O/P EST LOW 20 MIN: CPT | Performed by: UROLOGY

## 2024-06-04 PROCEDURE — 1160F RVW MEDS BY RX/DR IN RCRD: CPT | Performed by: UROLOGY

## 2024-06-04 NOTE — PROGRESS NOTES
Amira Ko MD  Department of Urology  [address]  [place] [address]    T: [phone]  F: [phone]    To: Rasheeda Boss MD   [address] [facility]  [place] [address]    Re: Sin Rao   MRN: [id number]  : 11/10/1950    Dear Rasheeda Boss MD,    Today I had the pleasure of seeing Sin Rao in my clinic. As you know, Mr. Rao is a pleasant 73 year old year old male who I am seeing for PSA check. Patient was last seen in this department on 2024.    Briefly, patient states that he has urgency and frequency.  He feels that this may be related to his hydrochlorothiazide.     He also has an isolated elevated PSA that was checked 1 month ago to 8.17.  He has never had a previous elevated PSA    Plan at last visit on 2024 was to repeat his PSA.  We also recommended trial of tamsulosin 0.4 mg nightly.    Please note that his urinalysis was contaminated but did show microscopic hematuria.    His repeat PSA demonstrated a PSA of 8.69.  His previous PSA value was 8.17 and prior to that it was 2.07.         PAST MEDICAL HISTORY:  Past Medical History:    Age-related nuclear cataract of both eyes    Aortic atherosclerosis (HCC)    CXR 12-19    Dyslipidemia    Essential hypertension    Glaucoma suspect of both eyes    Hx of adenomatous colonic polyps    polyps removed, patient can repeat in 5 years IF remains in good health/chooses to continue screening    Pancolonic diverticulosis    Colonoscopy 12-20    Prediabetes    Pulmonary hypertension (HCC)    Echo 8-21 with mild LVH, EF 65-70%, mild AI, PAP 34 mmHg    Vitreous floaters of both eyes        PAST SURGICAL HISTORY:  Past Surgical History:   Procedure Laterality Date    Hernia surgery  ,          ALLERGIES:  No Known Allergies      MEDICATIONS:  Current Outpatient Medications   Medication Instructions    aspirin 81 mg, Oral, Daily    atorvastatin (LIPITOR) 10 mg, Oral, Nightly     hydroCHLOROthiazide 25 mg, Oral, Daily    olmesartan (BENICAR) 20 mg, Oral, Daily    tamsulosin (FLOMAX) 0.4 mg, Oral, Daily, Take 1/2 hour following the same meal each day        FAMILY HISTORY:  Family History   Problem Relation Age of Onset    Hypertension Mother     Hypertension Sister     Hypertension Brother     Hypertension Brother     Heart Disorder Brother     Diabetes Neg     Glaucoma Neg     Macular degeneration Neg         SOCIAL HISTORY:  Social History     Socioeconomic History    Marital status: Single   Tobacco Use    Smoking status: Never    Smokeless tobacco: Never   Vaping Use    Vaping status: Never Used   Substance and Sexual Activity    Alcohol use: Yes     Comment: 1x/month    Drug use: No          PHYSICAL EXAMINATION:  There were no vitals filed for this visit.  CONSTITUTIONAL: No apparent distress, cooperative and communicative  NEUROLOGIC: Alert and oriented   HEAD: Normocephalic, atraumatic   EYES: Sclera non-icteric   ENT: Hearing intact, moist mucous membranes   NECK: No obvious goiter or masses   RESPIRATORY: Normal respiratory effort, Nonlabored breathing on room air  SKIN: No evident rashes   ABDOMEN: Soft, nontender, nondistended, no rebound tenderness, no guarding, no masses    DIGITAL RECTAL EXAM: firm nodule palpated on the right base, possibly BPH nodule, no other nodules palpated    REVIEW OF SYSTEMS:    A comprehensive 10-point review of systems was completed.  Pertinent positives and negatives are noted in the the HPI.       LABORATORY DATA:  Component      Latest Ref Rng 12/27/2018 10/31/2019 7/14/2021 7/21/2022 4/17/2024   PSA      0.0 - 4.0 ng/mL 0.5            PSA Screen      <=4.00    0.59  0.82  2.07  8.17 (H)       Legend:  (H) High     HgbA1C  <5.7 % 6.5 High    Comment:  Normal HbA1C:     <5.7%   Pre-Diabetic:     5.7 - 6.4%   Diabetic:         >6.4%   Diabetic Control: <7.0%      Estimated Average Glucose  68 - 126 mg/dL 140 High             Component  Ref Range &  Units 4/17/24 11:27 AM   Cholesterol, Total  <200 mg/dL 167   Comment: Desirable  <200 mg/dL  Borderline  200-239 mg/dL  High      >=240 mg/dL      HDL Cholesterol  40 - 59 mg/dL 36 Low    Comment: Interpretive Information:  An HDL cholesterol <40 mg/dL is low and constitutes a coronary heart disease risk factor. An HDL cholesterol >60 mg/dL is a negative risk factor for coronary heart disease.      Triglycerides  30 - 149 mg/dL 139   Comment: Reference interval for fasting triglycerides  Desirable: <150 mg/dL  Borderline: 150-199 mg/dL  High: 200-499 mg/dL  Very High: >=500 mg/dL        LDL Cholesterol  <100 mg/dL 106 High    Comment: Optimal            <100 mg/dL  Near/Above OptimaL 100-129 mg/dL  Borderline High    130-159 mg/dL  High               160-189 mg/dL    Very High          >190 mg/dL      VLDL  0 - 30 mg/dL 24   Non HDL Chol  <130 mg/dL 131 High    Comment: Desirable  <130 mg/dL  Borderline  130-159 mg/dL  High        160-189 mg/dL      Very high >=190 mg/dL      Patient Fasting for Lipid? Yes            IMAGING REVIEW:  PROCEDURE: XR CHEST PA + LAT CHEST (CPT=71020)     COMPARISON: None.     INDICATIONS: Cough x 1 month.  History of hypertension.     TECHNIQUE:   Two views.       FINDINGS:  CARDIAC/VASC: No cardiac silhouette abnormality or cardiomegaly.  Unremarkable pulmonary vasculature.    MEDIAST/MELLISSA: Atherosclerotic aorta with no visible aneurysm.    LUNGS/PLEURA: No significant pulmonary parenchymal abnormalities.  No effusion or pleural thickening.    BONES: Mild degenerative disc disease and spondylosis without visible acute abnormalities.    OTHER: Negative.                Impression  CONCLUSION: No acute cardiopulmonary abnormality.     Dictated by (CST): Nikko Weinstein MD on 12/27/2019 at 15:47      Approved by (CST): Nikko Weinstein MD on 12/27/2019 at 15:48           OTHER RELEVANT DATA:   none        IMPRESSION: 1.  Elevated PSA on multiple occasions-will plan for prostate MRI to determine  if biopsy is indicated.  I did offer patient up for biopsy.    2.  Microscopic hematuria and contaminated specimen we will obtain a UA microscopic to see if he has true microscopic hematuria.  If he does not we will need to proceed with CT urogram and cystoscopy.    2.  Lower urinary tract symptoms he will continue on behavioral management and tamsulosin 0.4 mg nightly     PLAN:  Prostate MRI  Return to clinic after prostate MRI  UA microscopic-if positive for microscopic hematuria will need cystoscopy and CT urogram  Continue behavioral management tamsulosin 0.4 mg nightly    Thank you for referring this very pleasant patient to my clinic. If you have any questions or concerns, please do not hesitate to contact me.    Sincerely,  Amira Ko MD    30 minutes were spent on this patient at this visit obtaining a history, reviewing medical records, developing a treatment plan, counseling and discussing treatment strategy with patient, coordination of care and documentation.     The 21st Century Cures Act makes medical notes available to patients in the interest of transparency.  However, please be advised that this is a medical document.  It is intended as a peer to peer communication.  It is written in medical language and may contain abbreviations or verbiage that are technical and unfamiliar.  It may appear blunt or direct.  Medical documents are intended to carry relevant information, facts as evident, and the clinical opinion of the practitioner.

## 2024-07-17 ENCOUNTER — HOSPITAL ENCOUNTER (OUTPATIENT)
Dept: MRI IMAGING | Facility: HOSPITAL | Age: 74
Discharge: HOME | End: 2024-07-17
Attending: UROLOGY
Payer: MEDICARE

## 2024-07-17 DIAGNOSIS — R97.20 ELEVATED PSA: ICD-10-CM

## 2024-07-17 PROCEDURE — 72197 MRI PELVIS W/O & W/DYE: CPT | Performed by: UROLOGY

## 2024-07-17 PROCEDURE — A9575 INJ GADOTERATE MEGLUMI 0.1ML: HCPCS

## 2024-07-17 RX ORDER — DIPHENHYDRAMINE HYDROCHLORIDE 50 MG/ML
10 INJECTION, SOLUTION INTRAMUSCULAR; INTRAVENOUS
Status: COMPLETED | OUTPATIENT
Start: 2024-07-17 | End: 2024-07-17

## 2024-07-17 RX ADMIN — DIPHENHYDRAMINE HYDROCHLORIDE 20 ML: 50 INJECTION, SOLUTION INTRAMUSCULAR; INTRAVENOUS at 20:57:00

## 2024-07-18 ENCOUNTER — TELEPHONE (OUTPATIENT)
Dept: SURGERY | Facility: CLINIC | Age: 74
End: 2024-07-18

## 2024-07-18 NOTE — TELEPHONE ENCOUNTER
----- Message from Amira Ko sent at 7/18/2024  9:43 AM CDT -----  Hi all   He does not have an appt please schedule appt to discuss prostate mri. Needs to see me next week.

## 2024-07-24 ENCOUNTER — OFFICE VISIT (OUTPATIENT)
Dept: SURGERY | Facility: CLINIC | Age: 74
End: 2024-07-24
Payer: MEDICARE

## 2024-07-24 DIAGNOSIS — R59.9 LYMPH NODE ENLARGEMENT: Primary | ICD-10-CM

## 2024-07-24 DIAGNOSIS — R97.20 ELEVATED PSA: Primary | ICD-10-CM

## 2024-07-24 PROCEDURE — 1160F RVW MEDS BY RX/DR IN RCRD: CPT | Performed by: UROLOGY

## 2024-07-24 PROCEDURE — 1159F MED LIST DOCD IN RCRD: CPT | Performed by: UROLOGY

## 2024-07-24 PROCEDURE — 99214 OFFICE O/P EST MOD 30 MIN: CPT | Performed by: UROLOGY

## 2024-07-24 NOTE — H&P (VIEW-ONLY)
Amira Ko MD  Department of Urology  [address] [place] [address] [address]  [place] [address]    T: [phone]  F: [phone]    To: Rasheeda Boss MD   No address on file    Re: Sin Rao   MRN: [id number]  : 11/10/1950    Dear Rasheeda Boss MD,    Today I had the pleasure of seeing Sin Rao in my clinic. As you know, Mr. Rao is a pleasant 73 year old year old male who I am seeing for prostate MRI review. Patient was last seen in this department on 2024.    Briefly, patient states that he has urgency and frequency.  He feels that this may be related to his hydrochlorothiazide.     He also has an isolated elevated PSA that was checked 1 month ago to 8.17.  He has never had a previous elevated PSA     Plan at last visit on 2024 was to repeat his PSA.  We also recommended trial of tamsulosin 0.4 mg nightly.     Please note that his urinalysis was contaminated but did show microscopic hematuria.     His repeat PSA demonstrated a PSA of 8.69.  His previous PSA value was 8.17 and prior to that it was 2.07.      We proceeded with a prostate MRI the MRI demonstrated a PI-RADS 3 lesion and a 2.8 cm enlarged obturator lymph node concerning for malignancy.    HOMERO with firm nodule palpated on the right base, possibly BPH nodule, no other nodules palpated           PAST MEDICAL HISTORY:  Past Medical History:    Age-related nuclear cataract of both eyes    Aortic atherosclerosis (HCC)    CXR 12-19    Dyslipidemia    Essential hypertension    Glaucoma suspect of both eyes    Hx of adenomatous colonic polyps    polyps removed, patient can repeat in 5 years IF remains in good health/chooses to continue screening    Pancolonic diverticulosis    Colonoscopy 12-20    Prediabetes    Pulmonary hypertension (HCC)    Echo 8-21 with mild LVH, EF 65-70%, mild AI, PAP 34 mmHg    Vitreous floaters of both eyes        PAST SURGICAL HISTORY:  Past Surgical History:   Procedure Laterality  Date    Hernia surgery  2008, 2010         ALLERGIES:  No Known Allergies      MEDICATIONS:  Current Outpatient Medications   Medication Instructions    aspirin 81 mg, Oral, Daily    atorvastatin (LIPITOR) 10 mg, Oral, Nightly    hydroCHLOROthiazide 25 mg, Oral, Daily    olmesartan (BENICAR) 20 mg, Oral, Daily    tamsulosin (FLOMAX) 0.4 mg, Oral, Daily, Take 1/2 hour following the same meal each day        FAMILY HISTORY:  Family History   Problem Relation Age of Onset    Hypertension Mother     Hypertension Sister     Hypertension Brother     Hypertension Brother     Heart Disorder Brother     Diabetes Neg     Glaucoma Neg     Macular degeneration Neg         SOCIAL HISTORY:  Social History     Socioeconomic History    Marital status: Single   Tobacco Use    Smoking status: Never    Smokeless tobacco: Never   Vaping Use    Vaping status: Never Used   Substance and Sexual Activity    Alcohol use: Yes     Comment: 1x/month    Drug use: No          PHYSICAL EXAMINATION:  There were no vitals filed for this visit.  CONSTITUTIONAL: No apparent distress, cooperative and communicative  NEUROLOGIC: Alert and oriented   HEAD: Normocephalic, atraumatic   EYES: Sclera non-icteric   ENT: Hearing intact, moist mucous membranes   NECK: No obvious goiter or masses   RESPIRATORY: Normal respiratory effort, Nonlabored breathing on room air  SKIN: No evident rashes   ABDOMEN: Soft, nontender, nondistended, no rebound tenderness, no guarding, no masses      REVIEW OF SYSTEMS:    A comprehensive 10-point review of systems was completed.  Pertinent positives and negatives are noted in the the HPI.       LABORATORY DATA:  WBC Urine  0 - 5 /HPF 1-5   RBC Urine  0 - 2 /HPF 0-2   Bacteria Urine  None Seen /HPF None Seen   Squamous Epi. Cells  None Seen /HPF None Seen   Renal Tubular Epithelial Cells  None Seen /HPF None Seen   Transitional Cells  None Seen /HPF None Seen   Yeast Urine  None Seen /HPF None Seen     Total PSA  <=4.00 ng/mL  8.69 High      Urine Color  Yellow Yellow   Clarity Urine  Clear Ex.Turbid Abnormal    Spec Gravity  1.005 - 1.030 1.025   Glucose Urine  Normal mg/dL Normal   Bilirubin Urine  Negative Negative   Ketones Urine  Negative mg/dL Negative   Blood Urine  Negative 2+ Abnormal    pH Urine  5.0 - 8.0 5.5   Protein Urine  Negative mg/dL Trace Abnormal    Urobilinogen Urine  Normal Normal   Nitrite Urine  Negative Negative   Leukocyte Esterase Urine  Negative Negative   WBC Urine  0 - 5 /HPF 1-5   RBC Urine  0 - 2 /HPF >10 Abnormal    Bacteria Urine  None Seen /HPF Rare Abnormal    Squamous Epi. Cells  None Seen /HPF Few Abnormal    Renal Tubular Epithelial Cells  None Seen /HPF None Seen   Transitional Cells  None Seen /HPF None Seen   Yeast Urine  None Seen /HPF None Seen           IMAGING REVIEW:  Narrative   PROCEDURE:  MRI PROSTATE (W+WO)(CPT=72197)     INDICATIONS:         R97.20 Elevated PSA     COMPARISON:  None.     TECHNIQUE:  3T. Body coil. Multiplanar T1, T2 weighted, 3D T2 weighted imaging sequences. Multi b value Diffusion weighted sequences. Dynamic post infusion sequences after the administration of   gadolinium based contrast.     PATIENT STATED HISTORY:(As transcribed by Technologist)  Patient states elevated PSA levels.      CONTRAST USED:  20 mL of Dotarem             FINDINGS:         PSA VALUE:   8.69     PROSTATE VOLUME:      4.2 x 3.8 x 5.4 cm for an elevated volume of 44.8 cc.    (L x W x AP) x 0.52     PERIPHERAL ZONE:               The peripheral zone is diffusely abnormal with low signal diffusely throughout the peripheral zone.  There is no focal nodularity.  There is no evidence of restricted diffusion or early nodular enhancement.  This may be due   to sequela of prior bouts of prostatitis.     TRANSITION ZONE:                       Mild to moderately hypointense nodularity to the right transitional zone within the mid gland.  These nodules are somewhat obscured borders posteriorly with the  largest nodule measuring 26 x 18 mm.  There is mildly  increased signal on the DWI B 1400 with mildly decreased ADC signal.  Therefore, this is a PI-RADS 3 lesion.  No evidence of abnormal enhancement.     SEMINAL VESICLES:       Seminal vesicles are unremarkable.     ADENOPATHY:                 Enlarged lymph node within the left obturator chain measures 26 x 16 by 28 mm with restricted diffusion and enhancement is suspicious for malignancy.  There are smaller indeterminate lymph nodes within the left external iliac  chain measuring 9 x 7 mm on the left and 8 x 8 mm.     VISUALIZED PELVIC BONE MARROW:  No suspicious lesion.     There is diverticulosis of the sigmoid colon without diverticulitis.                   Impression   CONCLUSION:     1. PI RADS 3 lesion within the right anterior transitional zone.     2. Highly suspicious left obturator enlarged lymph node.  Further evaluation with tissue sampling would be recommended.        LOCATION:  [facility]     PI-RADS 1 - Very low probability of cancer (Clinically significant cancer is highly unlikely to be present)  PI-RADS 2 - Low probability of cancer (Clinically significant cancer is unlikely to be present)  PI-RADS 3 - Intermediate probability of cancer (The presence of clinically significant cancer is equivocal)  PI-RADS 4 - High probability of cancer (Clinically significant cancer is likely to be present)  PI-RADS 5 - Very high probability of cancer (Clinically significant cancer is highly likely to be present)     Cancer Imagin.        OTHER RELEVANT DATA:   none     IMPRESSION: Elevated PSA, abnormal HOMERO with PIRADS 3 lesion on MRI and suspicious obturator lymph node - recommend IR consult for biopsy of node to evaluate for locally metastatic disease which would determine treatment options.     If biopsy is negative, may need to consider prostate biopsy.     PLAN:  IR consult for obturator node biopsy  Placeholder appt made for January - but will need to  see sooner based on pathology     Thank you for referring this very pleasant patient to my clinic. If you have any questions or concerns, please do not hesitate to contact me.    Sincerely,  Amira Ko MD    30 minutes were spent on this patient at this visit obtaining a history, reviewing medical records, developing a treatment plan, counseling and discussing treatment strategy with patient, coordination of care and documentation.     The 21st Century Cures Act makes medical notes available to patients in the interest of transparency.  However, please be advised that this is a medical document.  It is intended as a peer to peer communication.  It is written in medical language and may contain abbreviations or verbiage that are technical and unfamiliar.  It may appear blunt or direct.  Medical documents are intended to carry relevant information, facts as evident, and the clinical opinion of the practitioner.

## 2024-07-24 NOTE — PROGRESS NOTES
Amira Ko MD  Department of Urology  [address] [place] [address] [address]  [place] [address]    T: [phone]  F: [phone]    To: Rasheeda Boss MD   No address on file    Re: Sin Rao   MRN: [id number]  : 11/10/1950    Dear Rasheeda Boss MD,    Today I had the pleasure of seeing Sin Rao in my clinic. As you know, Mr. Rao is a pleasant 73 year old year old male who I am seeing for prostate MRI review. Patient was last seen in this department on 2024.    Briefly, patient states that he has urgency and frequency.  He feels that this may be related to his hydrochlorothiazide.     He also has an isolated elevated PSA that was checked 1 month ago to 8.17.  He has never had a previous elevated PSA     Plan at last visit on 2024 was to repeat his PSA.  We also recommended trial of tamsulosin 0.4 mg nightly.     Please note that his urinalysis was contaminated but did show microscopic hematuria.     His repeat PSA demonstrated a PSA of 8.69.  His previous PSA value was 8.17 and prior to that it was 2.07.      We proceeded with a prostate MRI the MRI demonstrated a PI-RADS 3 lesion and a 2.8 cm enlarged obturator lymph node concerning for malignancy.    HOMERO with firm nodule palpated on the right base, possibly BPH nodule, no other nodules palpated           PAST MEDICAL HISTORY:  Past Medical History:    Age-related nuclear cataract of both eyes    Aortic atherosclerosis (HCC)    CXR 12-19    Dyslipidemia    Essential hypertension    Glaucoma suspect of both eyes    Hx of adenomatous colonic polyps    polyps removed, patient can repeat in 5 years IF remains in good health/chooses to continue screening    Pancolonic diverticulosis    Colonoscopy 12-20    Prediabetes    Pulmonary hypertension (HCC)    Echo 8-21 with mild LVH, EF 65-70%, mild AI, PAP 34 mmHg    Vitreous floaters of both eyes        PAST SURGICAL HISTORY:  Past Surgical History:   Procedure Laterality  Date    Hernia surgery  2008, 2010         ALLERGIES:  No Known Allergies      MEDICATIONS:  Current Outpatient Medications   Medication Instructions    aspirin 81 mg, Oral, Daily    atorvastatin (LIPITOR) 10 mg, Oral, Nightly    hydroCHLOROthiazide 25 mg, Oral, Daily    olmesartan (BENICAR) 20 mg, Oral, Daily    tamsulosin (FLOMAX) 0.4 mg, Oral, Daily, Take 1/2 hour following the same meal each day        FAMILY HISTORY:  Family History   Problem Relation Age of Onset    Hypertension Mother     Hypertension Sister     Hypertension Brother     Hypertension Brother     Heart Disorder Brother     Diabetes Neg     Glaucoma Neg     Macular degeneration Neg         SOCIAL HISTORY:  Social History     Socioeconomic History    Marital status: Single   Tobacco Use    Smoking status: Never    Smokeless tobacco: Never   Vaping Use    Vaping status: Never Used   Substance and Sexual Activity    Alcohol use: Yes     Comment: 1x/month    Drug use: No          PHYSICAL EXAMINATION:  There were no vitals filed for this visit.  CONSTITUTIONAL: No apparent distress, cooperative and communicative  NEUROLOGIC: Alert and oriented   HEAD: Normocephalic, atraumatic   EYES: Sclera non-icteric   ENT: Hearing intact, moist mucous membranes   NECK: No obvious goiter or masses   RESPIRATORY: Normal respiratory effort, Nonlabored breathing on room air  SKIN: No evident rashes   ABDOMEN: Soft, nontender, nondistended, no rebound tenderness, no guarding, no masses      REVIEW OF SYSTEMS:    A comprehensive 10-point review of systems was completed.  Pertinent positives and negatives are noted in the the HPI.       LABORATORY DATA:  WBC Urine  0 - 5 /HPF 1-5   RBC Urine  0 - 2 /HPF 0-2   Bacteria Urine  None Seen /HPF None Seen   Squamous Epi. Cells  None Seen /HPF None Seen   Renal Tubular Epithelial Cells  None Seen /HPF None Seen   Transitional Cells  None Seen /HPF None Seen   Yeast Urine  None Seen /HPF None Seen     Total PSA  <=4.00 ng/mL  8.69 High      Urine Color  Yellow Yellow   Clarity Urine  Clear Ex.Turbid Abnormal    Spec Gravity  1.005 - 1.030 1.025   Glucose Urine  Normal mg/dL Normal   Bilirubin Urine  Negative Negative   Ketones Urine  Negative mg/dL Negative   Blood Urine  Negative 2+ Abnormal    pH Urine  5.0 - 8.0 5.5   Protein Urine  Negative mg/dL Trace Abnormal    Urobilinogen Urine  Normal Normal   Nitrite Urine  Negative Negative   Leukocyte Esterase Urine  Negative Negative   WBC Urine  0 - 5 /HPF 1-5   RBC Urine  0 - 2 /HPF >10 Abnormal    Bacteria Urine  None Seen /HPF Rare Abnormal    Squamous Epi. Cells  None Seen /HPF Few Abnormal    Renal Tubular Epithelial Cells  None Seen /HPF None Seen   Transitional Cells  None Seen /HPF None Seen   Yeast Urine  None Seen /HPF None Seen           IMAGING REVIEW:  Narrative   PROCEDURE:  MRI PROSTATE (W+WO)(CPT=72197)     INDICATIONS:         R97.20 Elevated PSA     COMPARISON:  None.     TECHNIQUE:  3T. Body coil. Multiplanar T1, T2 weighted, 3D T2 weighted imaging sequences. Multi b value Diffusion weighted sequences. Dynamic post infusion sequences after the administration of   gadolinium based contrast.     PATIENT STATED HISTORY:(As transcribed by Technologist)  Patient states elevated PSA levels.      CONTRAST USED:  20 mL of Dotarem             FINDINGS:         PSA VALUE:   8.69     PROSTATE VOLUME:      4.2 x 3.8 x 5.4 cm for an elevated volume of 44.8 cc.    (L x W x AP) x 0.52     PERIPHERAL ZONE:               The peripheral zone is diffusely abnormal with low signal diffusely throughout the peripheral zone.  There is no focal nodularity.  There is no evidence of restricted diffusion or early nodular enhancement.  This may be due   to sequela of prior bouts of prostatitis.     TRANSITION ZONE:                       Mild to moderately hypointense nodularity to the right transitional zone within the mid gland.  These nodules are somewhat obscured borders posteriorly with the  largest nodule measuring 26 x 18 mm.  There is mildly  increased signal on the DWI B 1400 with mildly decreased ADC signal.  Therefore, this is a PI-RADS 3 lesion.  No evidence of abnormal enhancement.     SEMINAL VESICLES:       Seminal vesicles are unremarkable.     ADENOPATHY:                 Enlarged lymph node within the left obturator chain measures 26 x 16 by 28 mm with restricted diffusion and enhancement is suspicious for malignancy.  There are smaller indeterminate lymph nodes within the left external iliac  chain measuring 9 x 7 mm on the left and 8 x 8 mm.     VISUALIZED PELVIC BONE MARROW:  No suspicious lesion.     There is diverticulosis of the sigmoid colon without diverticulitis.                   Impression   CONCLUSION:     1. PI RADS 3 lesion within the right anterior transitional zone.     2. Highly suspicious left obturator enlarged lymph node.  Further evaluation with tissue sampling would be recommended.        LOCATION:  [facility]     PI-RADS 1 - Very low probability of cancer (Clinically significant cancer is highly unlikely to be present)  PI-RADS 2 - Low probability of cancer (Clinically significant cancer is unlikely to be present)  PI-RADS 3 - Intermediate probability of cancer (The presence of clinically significant cancer is equivocal)  PI-RADS 4 - High probability of cancer (Clinically significant cancer is likely to be present)  PI-RADS 5 - Very high probability of cancer (Clinically significant cancer is highly likely to be present)     Cancer Imagin.        OTHER RELEVANT DATA:   none     IMPRESSION: Elevated PSA, abnormal HOMERO with PIRADS 3 lesion on MRI and suspicious obturator lymph node - recommend IR consult for biopsy of node to evaluate for locally metastatic disease which would determine treatment options.     If biopsy is negative, may need to consider prostate biopsy.     PLAN:  IR consult for obturator node biopsy  Placeholder appt made for January - but will need to  see sooner based on pathology     Thank you for referring this very pleasant patient to my clinic. If you have any questions or concerns, please do not hesitate to contact me.    Sincerely,  Amira Ko MD    30 minutes were spent on this patient at this visit obtaining a history, reviewing medical records, developing a treatment plan, counseling and discussing treatment strategy with patient, coordination of care and documentation.     The 21st Century Cures Act makes medical notes available to patients in the interest of transparency.  However, please be advised that this is a medical document.  It is intended as a peer to peer communication.  It is written in medical language and may contain abbreviations or verbiage that are technical and unfamiliar.  It may appear blunt or direct.  Medical documents are intended to carry relevant information, facts as evident, and the clinical opinion of the practitioner.

## 2024-08-02 ENCOUNTER — TELEPHONE (OUTPATIENT)
Dept: INTERNAL MEDICINE CLINIC | Facility: CLINIC | Age: 74
End: 2024-08-02

## 2024-08-02 NOTE — TELEPHONE ENCOUNTER
Dr. Sutton, please sign Letter under communications tab for your electronic signature.   Thank you.

## 2024-08-02 NOTE — TELEPHONE ENCOUNTER
Patient is having a prostate biopsy on 8/7-24 and needs a letter that he can hold his aspirin for 5 days.     Former patient of Dr. Hernandez. RN relayed he does need to establish care with a new doctor as soon as possible.     Dr. Sutton, please advise on letter. Basic letter pended under communications tab.

## 2024-08-02 NOTE — TELEPHONE ENCOUNTER
Patient contacted. Verified name and date of birth.  Patient informed of Dr. Sutton's message and verbalized understanding.  Instructed on where to find the letter.

## 2024-08-07 ENCOUNTER — HOSPITAL ENCOUNTER (OUTPATIENT)
Dept: CT IMAGING | Facility: HOSPITAL | Age: 74
Discharge: HOME | End: 2024-08-07
Attending: STUDENT IN AN ORGANIZED HEALTH CARE EDUCATION/TRAINING PROGRAM
Payer: MEDICARE

## 2024-08-07 VITALS
SYSTOLIC BLOOD PRESSURE: 136 MMHG | RESPIRATION RATE: 19 BRPM | OXYGEN SATURATION: 92 % | DIASTOLIC BLOOD PRESSURE: 73 MMHG | HEART RATE: 55 BPM

## 2024-08-07 DIAGNOSIS — R59.9 LYMPH NODE ENLARGEMENT: ICD-10-CM

## 2024-08-07 LAB
DEPRECATED RDW RBC AUTO: 41.6 FL (ref 35.1–46.3)
ERYTHROCYTE [DISTWIDTH] IN BLOOD BY AUTOMATED COUNT: 13.9 % (ref 11–15)
HCT VFR BLD AUTO: 40.9 %
HGB BLD-MCNC: 13.5 G/DL
MCH RBC QN AUTO: 27.2 PG (ref 26–34)
MCHC RBC AUTO-ENTMCNC: 33 G/DL (ref 31–37)
MCV RBC AUTO: 82.5 FL
PLATELET # BLD AUTO: 293 10(3)UL (ref 150–450)
RBC # BLD AUTO: 4.96 X10(6)UL
WBC # BLD AUTO: 8.1 X10(3) UL (ref 4–11)

## 2024-08-07 PROCEDURE — 38505 NEEDLE BIOPSY LYMPH NODES: CPT | Performed by: STUDENT IN AN ORGANIZED HEALTH CARE EDUCATION/TRAINING PROGRAM

## 2024-08-07 PROCEDURE — 99152 MOD SED SAME PHYS/QHP 5/>YRS: CPT | Performed by: STUDENT IN AN ORGANIZED HEALTH CARE EDUCATION/TRAINING PROGRAM

## 2024-08-07 PROCEDURE — 77012 CT SCAN FOR NEEDLE BIOPSY: CPT | Performed by: STUDENT IN AN ORGANIZED HEALTH CARE EDUCATION/TRAINING PROGRAM

## 2024-08-07 PROCEDURE — 88334 PATH CONSLTJ SURG CYTO XM EA: CPT | Performed by: STUDENT IN AN ORGANIZED HEALTH CARE EDUCATION/TRAINING PROGRAM

## 2024-08-07 PROCEDURE — 99153 MOD SED SAME PHYS/QHP EA: CPT | Performed by: STUDENT IN AN ORGANIZED HEALTH CARE EDUCATION/TRAINING PROGRAM

## 2024-08-07 PROCEDURE — 88341 IMHCHEM/IMCYTCHM EA ADD ANTB: CPT | Performed by: STUDENT IN AN ORGANIZED HEALTH CARE EDUCATION/TRAINING PROGRAM

## 2024-08-07 PROCEDURE — 88342 IMHCHEM/IMCYTCHM 1ST ANTB: CPT | Performed by: STUDENT IN AN ORGANIZED HEALTH CARE EDUCATION/TRAINING PROGRAM

## 2024-08-07 PROCEDURE — 88305 TISSUE EXAM BY PATHOLOGIST: CPT | Performed by: STUDENT IN AN ORGANIZED HEALTH CARE EDUCATION/TRAINING PROGRAM

## 2024-08-07 PROCEDURE — 85027 COMPLETE CBC AUTOMATED: CPT | Performed by: STUDENT IN AN ORGANIZED HEALTH CARE EDUCATION/TRAINING PROGRAM

## 2024-08-07 PROCEDURE — 36415 COLL VENOUS BLD VENIPUNCTURE: CPT | Performed by: STUDENT IN AN ORGANIZED HEALTH CARE EDUCATION/TRAINING PROGRAM

## 2024-08-07 RX ORDER — SODIUM CHLORIDE 9 MG/ML
INJECTION, SOLUTION INTRAVENOUS CONTINUOUS
Status: DISCONTINUED | OUTPATIENT
Start: 2024-08-07 | End: 2024-08-09

## 2024-08-07 RX ORDER — MIDAZOLAM HYDROCHLORIDE 1 MG/ML
1 INJECTION INTRAMUSCULAR; INTRAVENOUS EVERY 5 MIN PRN
Status: DISCONTINUED | OUTPATIENT
Start: 2024-08-07 | End: 2024-08-09

## 2024-08-07 RX ORDER — MIDAZOLAM HYDROCHLORIDE 1 MG/ML
INJECTION INTRAMUSCULAR; INTRAVENOUS
Status: COMPLETED
Start: 2024-08-07 | End: 2024-08-07

## 2024-08-07 RX ORDER — NALOXONE HYDROCHLORIDE 0.4 MG/ML
0.08 INJECTION, SOLUTION INTRAMUSCULAR; INTRAVENOUS; SUBCUTANEOUS AS NEEDED
Status: DISCONTINUED | OUTPATIENT
Start: 2024-08-07 | End: 2024-08-09

## 2024-08-07 RX ORDER — FLUMAZENIL 0.1 MG/ML
0.2 INJECTION INTRAVENOUS AS NEEDED
Status: DISCONTINUED | OUTPATIENT
Start: 2024-08-07 | End: 2024-08-09

## 2024-08-07 RX ADMIN — MIDAZOLAM HYDROCHLORIDE 2 MG: 1 INJECTION INTRAMUSCULAR; INTRAVENOUS at 10:07:00

## 2024-08-07 RX ADMIN — SODIUM CHLORIDE: 9 INJECTION, SOLUTION INTRAVENOUS at 10:00:00

## 2024-08-07 NOTE — INTERVAL H&P NOTE
The above referenced H&P was reviewed by Carlos Rahman MD on 8/7/2024, the patient was examined and no significant changes have occurred in the patient's condition since the H&P was performed.  Risks, benefits, alternative treatments and consequences of no treatment were discussed.  We will proceed with procedure as planned.      Carlos Rahman MD  8/7/2024  10:02 AM

## 2024-08-07 NOTE — PROCEDURES
Interventional Radiology  Brief Post-Procedure Note    Procedure(s): biopsy    Indication: enlarged left obturator node    (s): Lacey    Anesthesia: Sedation    Findings:    -CT guided  -6x 18g cores obtained from left obturator node    Blood loss: <1 mL      Complications: None    Plan: follow up path    Please refer to full dictation under the \"Imaging\" tab in Epic.    Carlos Rahman MD  8/7/2024  Interventional Radiology  [facility]

## 2024-08-07 NOTE — DISCHARGE INSTRUCTIONS
Procedure performed by Dr. GAXIOLA.    Biopsy/Aspiration of LEFT PELVIC LYMPH NODE.    DISCHARGE INSTRUCTIONS                               DO NOT TAKE aspirin-containing products, Ibuprofen, Vitamin E, or                              blood thinning products for three (3) days after the procedure.  You may                             take Tylenol (1 or 2 tablets every 4-6 hours) for mild discomfort at the                             biopsy site.  Rest quietly for 24 hours, no physical activity.  Avoid                              straining, heavy lifting, or strenuous physical activity for approximately                             2 days.  Report any bleeding at aspiration/biopsy site, redness,                             swelling, odor, discharge, pain or fever that does not lessen after one                              day, to your physician.  Resume a regular diet.  Call your physician with                             questions or test results.  Also you may contact the Radiology Nurse                             at [phone] with any additional questions or concerns.    Other: YOU MAY REMOVE  YOUR DRESSING IN 48 HRS. WHILE THE DRESSING IS ON, YOU MAY SHOWER. DO NOT SOAK IN WATER FOR 5 DAYS. .    BRING THIS SHEET WITH YOU SHOULD YOU HAVE TO VISIT AN EMERGENCY ROOM OR SEE YOUR DOCTOR IN THE NEXT 24 HOURS.

## 2024-08-12 ENCOUNTER — TELEPHONE (OUTPATIENT)
Dept: SURGERY | Facility: CLINIC | Age: 74
End: 2024-08-12

## 2024-08-12 NOTE — TELEPHONE ENCOUNTER
Pt called [facility]  What is the next step.  Please call pt

## 2024-08-14 NOTE — TELEPHONE ENCOUNTER
These results go to the IR physician which is why I hadn't reviewed them. Please make him an appointment to see me to discuss next steps.

## 2024-08-14 NOTE — TELEPHONE ENCOUNTER
Called patient, verified name and . Helped him schedule an appointments with Dr. Ko for next week, also told patient to expect a call from Interventional Radiologist to discuss biopsy results.   Patient agreed, verbalized understandings and has no further questions.

## 2024-08-21 ENCOUNTER — TELEPHONE (OUTPATIENT)
Dept: HEMATOLOGY/ONCOLOGY | Facility: HOSPITAL | Age: 74
End: 2024-08-21

## 2024-08-21 ENCOUNTER — OFFICE VISIT (OUTPATIENT)
Dept: SURGERY | Facility: CLINIC | Age: 74
End: 2024-08-21
Payer: MEDICARE

## 2024-08-21 DIAGNOSIS — R97.20 ELEVATED PSA: Primary | ICD-10-CM

## 2024-08-21 PROCEDURE — 1160F RVW MEDS BY RX/DR IN RCRD: CPT | Performed by: UROLOGY

## 2024-08-21 PROCEDURE — 1159F MED LIST DOCD IN RCRD: CPT | Performed by: UROLOGY

## 2024-08-21 PROCEDURE — 99213 OFFICE O/P EST LOW 20 MIN: CPT | Performed by: UROLOGY

## 2024-08-21 NOTE — TELEPHONE ENCOUNTER
LM for patient to call back to schedule consultation with Dr. Martinez on 8/30 after his PET scan on 8/28. Left number for him to call back and instructed him to ask for Lennie.

## 2024-08-21 NOTE — PROGRESS NOTES
Amira Ko MD  Department of Urology  [address] [place] [address] Rd[address]  [place] [address]    T: [phone]  F: [phone]    To: Rasheeda Boss MD   No address on file    Re: Sin Rao   MRN: [id number]  : 11/10/1950    Dear Rasheeda Boss MD,    Today I had the pleasure of seeing Sin Rao in my clinic. As you know, Mr. Rao is a pleasant 73 year old year old male who I am seeing for biopsy results. Patient was last seen in this department on 2024.    Briefly, patient states that he has urgency and frequency.  He feels that this may be related to his hydrochlorothiazide.     He also has an isolated elevated PSA that was checked 1 month ago to 8.17.  He has never had a previous elevated PSA     Plan at prior visit on 2024 was to repeat his PSA.  We also recommended trial of tamsulosin 0.4 mg nightly.     Please note that his urinalysis was contaminated but did show microscopic hematuria.     His repeat PSA demonstrated a PSA of 8.69.  His previous PSA value was 8.17 and prior to that it was 2.07.       We proceeded with a prostate MRI the MRI demonstrated a PI-RADS 3 lesion and a 2.8 cm enlarged obturator lymph node concerning for malignancy.     HOMERO with firm nodule palpated on the right base, possibly BPH nodule, no other nodules palpated     Please note that given his MRI findings I was suspicious obturator node we recommended IR consult for biopsy.  He was noted to have Multiple fragments of poorly differentiated prostate cancer in the left pelvic node.       PAST MEDICAL HISTORY:  Past Medical History:    Age-related nuclear cataract of both eyes    Aortic atherosclerosis (HCC)    CXR 12-19    Dyslipidemia    Essential hypertension    Glaucoma suspect of both eyes    Hx of adenomatous colonic polyps    polyps removed, patient can repeat in 5 years IF remains in good health/chooses to continue screening    Pancolonic diverticulosis    Colonoscopy 12-20     Prediabetes    Pulmonary hypertension (HCC)    Echo 8-21 with mild LVH, EF 65-70%, mild AI, PAP 34 mmHg    Vitreous floaters of both eyes        PAST SURGICAL HISTORY:  Past Surgical History:   Procedure Laterality Date    Hernia surgery  2008, 2010         ALLERGIES:  No Known Allergies      MEDICATIONS:  Current Outpatient Medications   Medication Instructions    aspirin 81 mg, Oral, Daily    atorvastatin (LIPITOR) 10 mg, Oral, Nightly    hydroCHLOROthiazide 25 mg, Oral, Daily    olmesartan (BENICAR) 20 mg, Oral, Daily    tamsulosin (FLOMAX) 0.4 mg, Oral, Daily, Take 1/2 hour following the same meal each day        FAMILY HISTORY:  Family History   Problem Relation Age of Onset    Hypertension Mother     Hypertension Sister     Hypertension Brother     Hypertension Brother     Heart Disorder Brother     Diabetes Neg     Glaucoma Neg     Macular degeneration Neg         SOCIAL HISTORY:  Social History     Socioeconomic History    Marital status: Single   Tobacco Use    Smoking status: Never    Smokeless tobacco: Never   Vaping Use    Vaping status: Never Used   Substance and Sexual Activity    Alcohol use: Yes     Comment: 1x/month    Drug use: No          PHYSICAL EXAMINATION:  There were no vitals filed for this visit.  CONSTITUTIONAL: No apparent distress, cooperative and communicative  NEUROLOGIC: Alert and oriented   HEAD: Normocephalic, atraumatic   EYES: Sclera non-icteric   ENT: Hearing intact, moist mucous membranes   NECK: No obvious goiter or masses   RESPIRATORY: Normal respiratory effort, Nonlabored breathing on room air  SKIN: No evident rashes   ABDOMEN: Soft, nontender, nondistended, no rebound tenderness, no guarding, no masses      REVIEW OF SYSTEMS:    A comprehensive 10-point review of systems was completed.  Pertinent positives and negatives are noted in the the HPI.       LABORATORY DATA:      Component  Ref Range & Units    Case Report   Surgical Pathology                                Case:  [id number]                                   Authorizing Provider:  Carlos Rahman MD       Collected:           08/07/2024 10:24 AM           Ordering Location:     [facility]       Received:            08/07/2024 10:56 AM           Pathologist:           Sonya Abraham MD                                                             Specimen:    Lymph node pelvic left, LEFT PELVIC LYMPH NODE, CT GUIDED, CORE X6                        Final Diagnosis:      Left pelvic lymph node; CT guided core biopsies:   Multiple fragments of lymph node with metastatic poorly differentiated adenocarcinoma, compatible with prostate primary.               Component  Ref Range & Units 6/4/24  3:44 PM   WBC Urine  0 - 5 /HPF 1-5   RBC Urine  0 - 2 /HPF 0-2   Bacteria Urine  None Seen /HPF None Seen   Squamous Epi. Cells  None Seen /HPF None Seen   Renal Tubular Epithelial Cells  None Seen /HPF None Seen   Transitional Cells  None Seen /HPF None Seen   Yeast Urine  None Seen /HPF None Seen              IMAGING REVIEW:  Narrative   PROCEDURE: CT BIOPSY LYMPH NODE (CPT=77012/83014)     INDICATIONS:  Incidentally found enlarged left obturator lymph node.  Biopsy for histopathologic diagnosis.     (S): Lacey     ANESTHESIA/SEDATION:  Level of anesthesia/sedation: Moderate sedation (conscious sedation)  Anesthesia/sedation administered by: Nurse or other independent trained observer who has no other duties with continuous monitoring of the patient's level of consciousness and physiologic status, under the personal supervision of the attending physician.  Total intra-service sedation time: 20 min     ESTIMATED BLOOD LOSS: Less than 5 mL     COMPLICATIONS: None     FINDINGS:     Informed consent was obtained. The patient was positioned supine.       Preliminary CT demonstrated an enlarged left obturator lymph node just posterior to the iliac vessels. The left lower quadrant was sterilely prepped and draped. Local  Lidocaine was administered. Under intermittent CT guidance, a 17 gauge introducer  needle was advanced into the lesion via inferior to superior approach to avoid the vessels. Through the introducer needle, a 18 gauge [employer] [place] biopsy device was placed. A total of 6 cores were obtained.     Samples were touch prepped and reviewed with cytology, confirming adequacy.     Hemostasis was achieved immediately. CT was performed post-biopsy, demonstrating no hematoma..               Impression   CONCLUSION:  CT-guided biopsy of left obturator lymph node.           Dictated by (CST): Carlos Rahman MD on 8/07/2024 at 5:00 PM      Finalized by (CST): Carlos Rahman MD on 8/07/2024 at 5:02 PM              OTHER RELEVANT DATA:   none     IMPRESSION: Elevated PSA with prostate MRI demonstrating enlarged pelvic lymph node which returned biopsy-proven poorly differentiated prostate cancer.  Will have patient follow-up with medical oncology and possible radiation oncology.  If prostate biopsy for tissue specimen is required we can assist with this.     PLAN:  PSMA PET scan  Medical oncology consult  Radiation oncology consult pending PET  If prostate tissue is required, we can arrange     Thank you for referring this very pleasant patient to my clinic. If you have any questions or concerns, please do not hesitate to contact me.    Sincerely,  Amira Ko MD    30 minutes were spent on this patient at this visit obtaining a history, reviewing medical records, developing a treatment plan, counseling and discussing treatment strategy with patient, coordination of care and documentation.     The 21st Century Cures Act makes medical notes available to patients in the interest of transparency.  However, please be advised that this is a medical document.  It is intended as a peer to peer communication.  It is written in medical language and may contain abbreviations or verbiage that are technical and unfamiliar.  It may  appear blunt or direct.  Medical documents are intended to carry relevant information, facts as evident, and the clinical opinion of the practitioner.

## 2024-08-22 ENCOUNTER — TELEPHONE (OUTPATIENT)
Dept: HEMATOLOGY/ONCOLOGY | Facility: HOSPITAL | Age: 74
End: 2024-08-22

## 2024-08-22 NOTE — TELEPHONE ENCOUNTER
Called Sin to make consult appointment with Dr. Martinez on Friday, August 30th. He is aware of location and time of appointment.

## 2024-08-28 ENCOUNTER — HOSPITAL ENCOUNTER (OUTPATIENT)
Dept: NUCLEAR MEDICINE | Facility: HOSPITAL | Age: 74
Discharge: HOME | End: 2024-08-28
Attending: UROLOGY
Payer: MEDICARE

## 2024-08-28 DIAGNOSIS — R97.20 ELEVATED PSA: ICD-10-CM

## 2024-08-28 PROCEDURE — 78815 PET IMAGE W/CT SKULL-THIGH: CPT | Performed by: UROLOGY

## 2024-08-29 ENCOUNTER — TELEPHONE (OUTPATIENT)
Dept: SURGERY | Facility: CLINIC | Age: 74
End: 2024-08-29

## 2024-08-29 NOTE — TELEPHONE ENCOUNTER
Patient states he was advised by Dr. Ko to let her know when he did the PET scan and to set up an appointment after to review the results. Patient states per Dr. Ko she would want to discuss results sooner than 1/3/2025 appointment. That is next available. Please call.    [facility]

## 2024-08-30 ENCOUNTER — OFFICE VISIT (OUTPATIENT)
Dept: HEMATOLOGY/ONCOLOGY | Facility: HOSPITAL | Age: 74
End: 2024-08-30
Attending: INTERNAL MEDICINE
Payer: MEDICARE

## 2024-08-30 ENCOUNTER — LAB REQUISITION (OUTPATIENT)
Dept: LAB | Facility: HOSPITAL | Age: 74
End: 2024-08-30
Payer: MEDICARE

## 2024-08-30 ENCOUNTER — TELEPHONE (OUTPATIENT)
Dept: FAMILY MEDICINE CLINIC | Facility: CLINIC | Age: 74
End: 2024-08-30

## 2024-08-30 VITALS
HEIGHT: 71.5 IN | DIASTOLIC BLOOD PRESSURE: 54 MMHG | BODY MASS INDEX: 31.5 KG/M2 | TEMPERATURE: 98 F | RESPIRATION RATE: 18 BRPM | OXYGEN SATURATION: 94 % | SYSTOLIC BLOOD PRESSURE: 119 MMHG | WEIGHT: 230 LBS | HEART RATE: 76 BPM

## 2024-08-30 DIAGNOSIS — C61 PROSTATE CANCER (HCC): Primary | ICD-10-CM

## 2024-08-30 DIAGNOSIS — R91.1 LUNG NODULE: ICD-10-CM

## 2024-08-30 DIAGNOSIS — C77.8: ICD-10-CM

## 2024-08-30 DIAGNOSIS — C61 MALIGNANT NEOPLASM OF PROSTATE (HCC): ICD-10-CM

## 2024-08-30 LAB
ALBUMIN SERPL-MCNC: 4.1 G/DL (ref 3.2–4.8)
ALBUMIN/GLOB SERPL: 1.5 {RATIO} (ref 1–2)
ALP LIVER SERPL-CCNC: 56 U/L
ALT SERPL-CCNC: 8 U/L
ANION GAP SERPL CALC-SCNC: 7 MMOL/L (ref 0–18)
AST SERPL-CCNC: 17 U/L (ref ?–34)
BASOPHILS # BLD AUTO: 0.05 X10(3) UL (ref 0–0.2)
BASOPHILS NFR BLD AUTO: 0.7 %
BILIRUB SERPL-MCNC: 0.6 MG/DL (ref 0.2–1.1)
BUN BLD-MCNC: 16 MG/DL (ref 9–23)
BUN/CREAT SERPL: 15 (ref 10–20)
CALCIUM BLD-MCNC: 9.4 MG/DL (ref 8.7–10.4)
CHLORIDE SERPL-SCNC: 106 MMOL/L (ref 98–112)
CO2 SERPL-SCNC: 27 MMOL/L (ref 21–32)
CREAT BLD-MCNC: 1.07 MG/DL
DEPRECATED RDW RBC AUTO: 42.5 FL (ref 35.1–46.3)
EGFRCR SERPLBLD CKD-EPI 2021: 72 ML/MIN/1.73M2 (ref 60–?)
EOSINOPHIL # BLD AUTO: 0.08 X10(3) UL (ref 0–0.7)
EOSINOPHIL NFR BLD AUTO: 1.2 %
ERYTHROCYTE [DISTWIDTH] IN BLOOD BY AUTOMATED COUNT: 14.1 % (ref 11–15)
GLOBULIN PLAS-MCNC: 2.8 G/DL (ref 2–3.5)
GLUCOSE BLD-MCNC: 126 MG/DL (ref 70–99)
HCT VFR BLD AUTO: 40.3 %
HGB BLD-MCNC: 13.3 G/DL
IMM GRANULOCYTES # BLD AUTO: 0.02 X10(3) UL (ref 0–1)
IMM GRANULOCYTES NFR BLD: 0.3 %
LYMPHOCYTES # BLD AUTO: 2.21 X10(3) UL (ref 1–4)
LYMPHOCYTES NFR BLD AUTO: 32.7 %
MCH RBC QN AUTO: 27.3 PG (ref 26–34)
MCHC RBC AUTO-ENTMCNC: 33 G/DL (ref 31–37)
MCV RBC AUTO: 82.8 FL
MONOCYTES # BLD AUTO: 0.66 X10(3) UL (ref 0.1–1)
MONOCYTES NFR BLD AUTO: 9.8 %
NEUTROPHILS # BLD AUTO: 3.73 X10 (3) UL (ref 1.5–7.7)
NEUTROPHILS # BLD AUTO: 3.73 X10(3) UL (ref 1.5–7.7)
NEUTROPHILS NFR BLD AUTO: 55.3 %
OSMOLALITY SERPL CALC.SUM OF ELEC: 293 MOSM/KG (ref 275–295)
PLATELET # BLD AUTO: 287 10(3)UL (ref 150–450)
POTASSIUM SERPL-SCNC: 3.3 MMOL/L (ref 3.5–5.1)
PROT SERPL-MCNC: 6.9 G/DL (ref 5.7–8.2)
PSA SERPL-MCNC: 19.24 NG/ML (ref ?–4)
RBC # BLD AUTO: 4.87 X10(6)UL
SODIUM SERPL-SCNC: 140 MMOL/L (ref 136–145)
WBC # BLD AUTO: 6.8 X10(3) UL (ref 4–11)

## 2024-08-30 PROCEDURE — 88363 XM ARCHIVE TISSUE MOLEC ANAL: CPT | Performed by: PATHOLOGY

## 2024-08-30 PROCEDURE — 99205 OFFICE O/P NEW HI 60 MIN: CPT | Performed by: INTERNAL MEDICINE

## 2024-08-30 RX ORDER — BICALUTAMIDE 50 MG/1
50 TABLET, FILM COATED ORAL DAILY
Qty: 30 TABLET | Refills: 0 | Status: SHIPPED | OUTPATIENT
Start: 2024-08-30

## 2024-08-30 NOTE — PROGRESS NOTES
[employer] collected and sent via [employer] on 8/30.      Order entered online for [employer] for xT and xR peripheral blood 23 g butterfly right AC PD-L1 will be drawn from Pathology specimen [id number] .

## 2024-08-30 NOTE — TELEPHONE ENCOUNTER
Reached patient for medication adherence consult. Patient overdue for refill on atorvastatin per insurance report.    Patient reports that he has not been taking his atorvastatin because he ran out. He reports tolerating the medication well. He states that his medication did not have a refill and due to other health circumstances that have consumed his time he did not request a refill. Will pend order for PCP to review.     Provided education on importance of adherence. Patient denies any questions or concerns with medication.

## 2024-08-30 NOTE — TELEPHONE ENCOUNTER
Patient returning call, informed office is closed. Patient requesting call on Tuesday at [phone],thanks.

## 2024-08-30 NOTE — CONSULTS
[facility] Consultation Note    Patient Name: Sin Rao   YOB: 1950   Medical Record Number: [id number]   CSN: [id number]   Consulting Physician: Caleb Martinez MD  Referring Physician(s): Dr. Amira Ko MD  Date of Consultation: 8/30/2024     Reason for Consultation:  Prostate Cancer   Cancer Staging   Prostate cancer (HCC)  Staging form: Prostate, AJCC 8th Edition  - Clinical: Stage IVB (cT2, cN1, cM1a, PSA: 8.2) - Signed by Caleb Martinez MD on 8/30/2024    History of Present Illness:   Sin Rao is a 73 year old male that was seen today in the Cancer Center for recently diagnosed prostate cancer. His oncologic history is detailed below    7/17/24 PSA 6/3/24 was elevated to 8.17, which prompted an MRI that showed a PI-RADS 3 lesion within the right anterior transitional zone as well as a highly suspicious left obturator lymph node that was enlarged.  Seminal vesicles were unremarkable.    8/7/2024 patient underwent a CT-guided core biopsy of the left pelvic lymph node which showed multiple fragments of lymph node with metastatic poorly differentiated adenocarcinoma compatible with prostate primary.  This was positive for CK8/18 CK20 and PSA negative for CK7 and TTF-1.    8/28/2024 the patient underwent a PSMA PET scan that showed marked heterogeneous activity demonstrated throughout the prostate gland and suspected local invasion.  Multifocal hypermetabolic intrapelvic and retroperitoneal lymph nodes compatible extensive metastatic disease.(Including multiple anterior aortocaval and pericaval lymph nodes as well as retrocaval lymphadenopathy.]  There is also appear to be hypermetabolic left supraclavicular lymph node as well as a mildly hypermetabolic right middle lobe pulmonary nodule that was concerning for metastatic disease.    The patient is now presenting to discuss systemic treatment for his node positive prostate cancer.      Past Medical History:  Past Medical  History:    Age-related nuclear cataract of both eyes    Aortic atherosclerosis (HCC)    CXR 12-19    Dyslipidemia    Essential hypertension    Glaucoma suspect of both eyes    Hx of adenomatous colonic polyps    polyps removed, patient can repeat in 5 years IF remains in good health/chooses to continue screening    Pancolonic diverticulosis    Colonoscopy 12-20    Prediabetes    Pulmonary hypertension (HCC)    Echo 8-21 with mild LVH, EF 65-70%, mild AI, PAP 34 mmHg    Vitreous floaters of both eyes       Past Surgical History:  Past Surgical History:   Procedure Laterality Date    Hernia surgery  2008, 2010       Family Medical History:  Family History   Problem Relation Age of Onset    Hypertension Mother     Hypertension Sister     Hypertension Brother     Hypertension Brother     Heart Disorder Brother     Diabetes Neg     Glaucoma Neg     Macular degeneration Neg        Gyne History:      Social History:  Social History     Socioeconomic History    Marital status: Single     Spouse name: Not on file    Number of children: Not on file    Years of education: Not on file    Highest education level: Not on file   Occupational History    Not on file   Tobacco Use    Smoking status: Never    Smokeless tobacco: Never   Vaping Use    Vaping status: Never Used   Substance and Sexual Activity    Alcohol use: Yes     Comment: 1x/month    Drug use: No    Sexual activity: Not on file   Other Topics Concern    Not on file   Social History Narrative    Not on file     Social Determinants of Health     Financial Resource Strain: Not on file   Food Insecurity: Not on file   Transportation Needs: Not on file   Physical Activity: Not on file   Stress: Not on file   Social Connections: Not on file   Housing Stability: Not on file       Allergies:   No Known Allergies    Current Medications:  No outpatient medications have been marked as taking for the 8/30/24 encounter (Appointment) with Caleb Martinez MD.       Review of  Systems:    Constitutional: Negative for anorexia, chills, fatigue, fevers, night sweats and weight loss.  Eyes: Negative for visual disturbance, irritation and redness.  Respiratory: Negative for cough, hemoptysis, chest pain, or dyspnea on exertion.  Gastrointestinal: Negative for dysphagia, odynophagia, reflux symptoms, nausea, vomiting, change in bowel habits, diarrhea, constipation and abdominal pain.  Integument/breast: Negative for rash, skin lesions, and pruritus.  Hematologic/lymphatic: Negative for easy bruising, bleeding, and lymphadenopathy.  Musculoskeletal: Negative for myalgias, arthralgias, muscle weakness.  : has decreased urinary stream but no hematuria  Neurological: Negative for headaches, dizziness, speech problems, gait problems and weakness.    A comprehensive 14 point review of systems was completed.  Pertinent positives and negatives noted in the the HPI.     Vital Signs:    Physical Examination:    General: Patient is alert and oriented x 3, not in acute distress.  Psych:  awake and alert, no distress  HEENT: EOMs intact. PERRL. Oropharynx is clear.   Neck: No JVD. No palpable lymphadenopathy. Neck is supple.  Lymphatics: There is no palpable lymphadenopathy throughout in the cervical, supraclavicular, axillary, or inguinal regions.  Chest: Clear to auscultation. No wheezes or rales.  Heart: Regular rate and rhythm. S1S2 normal.  Abdomen: Soft, non tender with good bowel sounds.  No hepatosplenomegaly.  No palpable mass.  Extremities: No edema or calf tenderness.  Neurological: Grossly intact.     Performance Status:  ECOG - 0    Labs:    Lab Results   Component Value Date/Time    WBC 8.1 08/07/2024 09:19 AM    RBC 4.96 08/07/2024 09:19 AM    HGB 13.5 08/07/2024 09:19 AM    HCT 40.9 08/07/2024 09:19 AM    MCV 82.5 08/07/2024 09:19 AM    MCH 27.2 08/07/2024 09:19 AM    MCHC 33.0 08/07/2024 09:19 AM    RDW 13.9 08/07/2024 09:19 AM    NEPRELIM 4.25 07/21/2022 08:32 AM    .0 08/07/2024  09:19 AM       Lab Results   Component Value Date/Time     (H) 04/17/2024 11:27 AM    BUN 16 04/17/2024 11:27 AM    CREATSERUM 1.26 04/17/2024 11:27 AM    GFRNAA 57 (L) 07/21/2022 08:32 AM    CA 9.7 04/17/2024 11:27 AM    ALB 4.2 04/17/2024 11:27 AM     04/17/2024 11:27 AM    K 3.9 04/17/2024 11:27 AM     04/17/2024 11:27 AM    CO2 30.0 04/17/2024 11:27 AM    ALKPHO 59 04/17/2024 11:27 AM    AST 20 04/17/2024 11:27 AM    ALT 9 (L) 04/17/2024 11:27 AM       Imaging:  Reviewed PET/CT films personally with patient    Pathology:  See above    Impression:      ICD-10-CM    1. Prostate cancer (HCC)  C61       2. 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    We discussed treatment options, with systemic androgen deprivation therapy along with an oral androgen receptor signaling inhibitor.  He appears to have low burden of metastatic disease with primary nonregional vik involvement  involvement, so I have recommended consideration of radiation therapy to the prostate    Plan:  Obtain baseline PSA since his last PSA was in April  Send bx specimens for NGS/MSI testing  Will plan to start ADT with lupron shortly. He will start casodex now and transition to Abiratereone/prednisone in the next few weeks  Systemic therapy teaching appointment with ANP soon  Refer to Radiation Oncology  Lung nodule: unclear etiology, will d/w pulmonology about EBUS bx vs IR guided bx, but given low PSMA uptake, may opt for surveillance repeat CT in 3 mos. He is a non-smoker      Emotional Well Being:    Emotional Well Being discussed, patient aware of support systems available through the Cancer Center. No acute issues.     The diagnosis, prognosis, treatment goals, plan for treatment, and expected response was explained to the  patient.       Thank you Dr Amira Ko for the opportunity to participate in the care of this interesting patient. Please do contact me if I may be of any further assistance    Caleb Martinez MD  [facility] Hematology/Oncology    Copy to : MD Lauro Gonzalez    Total time on this consult was  65 minutes, more than 50% of the time was spent in counseling and/or coordination of care related to newly diagnosed prostate cancer.

## 2024-09-03 ENCOUNTER — TELEPHONE (OUTPATIENT)
Dept: SURGERY | Facility: CLINIC | Age: 74
End: 2024-09-03

## 2024-09-03 DIAGNOSIS — C61 PROSTATE CANCER (HCC): Primary | ICD-10-CM

## 2024-09-03 DIAGNOSIS — C61 PROSTATE CANCER (HCC): ICD-10-CM

## 2024-09-03 RX ORDER — ABIRATERONE ACETATE 250 MG/1
1000 TABLET ORAL DAILY
Qty: 120 TABLET | Refills: 11 | Status: SHIPPED | OUTPATIENT
Start: 2024-09-03 | End: 2024-09-04

## 2024-09-03 RX ORDER — ATORVASTATIN CALCIUM 10 MG/1
10 TABLET, FILM COATED ORAL NIGHTLY
Qty: 90 TABLET | Refills: 0 | Status: SHIPPED | OUTPATIENT
Start: 2024-09-03

## 2024-09-03 RX ORDER — PREDNISONE 5 MG/1
5 TABLET ORAL DAILY
Qty: 30 TABLET | Refills: 11 | Status: SHIPPED | OUTPATIENT
Start: 2024-09-03

## 2024-09-03 NOTE — TELEPHONE ENCOUNTER
Spoke to Pt. Regarding a refer. Informed him that  there is no referral  for this appt. Pt did state that he does not have a current PCP. Will get referral once established and call us back

## 2024-09-04 ENCOUNTER — TELEPHONE (OUTPATIENT)
Dept: HEMATOLOGY/ONCOLOGY | Facility: HOSPITAL | Age: 74
End: 2024-09-04

## 2024-09-04 ENCOUNTER — TELEPHONE (OUTPATIENT)
Dept: SURGERY | Facility: CLINIC | Age: 74
End: 2024-09-04

## 2024-09-04 ENCOUNTER — OFFICE VISIT (OUTPATIENT)
Dept: HEMATOLOGY/ONCOLOGY | Facility: HOSPITAL | Age: 74
End: 2024-09-04
Attending: INTERNAL MEDICINE
Payer: MEDICARE

## 2024-09-04 DIAGNOSIS — C61 PROSTATE CANCER (HCC): ICD-10-CM

## 2024-09-04 DIAGNOSIS — C77.8: Primary | ICD-10-CM

## 2024-09-04 PROCEDURE — 99214 OFFICE O/P EST MOD 30 MIN: CPT | Performed by: NURSE PRACTITIONER

## 2024-09-04 RX ORDER — PROCHLORPERAZINE MALEATE 10 MG
10 TABLET ORAL EVERY 6 HOURS PRN
Qty: 30 TABLET | Refills: 3 | Status: SHIPPED | OUTPATIENT
Start: 2024-09-04

## 2024-09-04 RX ORDER — ABIRATERONE ACETATE 250 MG/1
1000 TABLET ORAL DAILY
Qty: 120 TABLET | Refills: 11 | Status: SHIPPED | OUTPATIENT
Start: 2024-09-04 | End: 2024-10-04

## 2024-09-04 NOTE — PATIENT INSTRUCTIONS
Medication Education Record:  IV/Oral Cancer    Learner:  Patient    Barriers / Limitations:  None    Psychosocial Assessment:  patient psychosocial response appropriate    Diagnosis:   Prostate Cancer    Readiness of the patient to learn and adhere to oral cancer treatment outside of the cancer center:  Yes    IV Cancer Treatment Name(s): Leuprolide Acetate (Eligard)  IV Cancer Treatment Frequency Every 6 months    Number of cycles planned Based on tolerance and response    Oral Cancer Treatment Medication(s):  Medication Name Dose/Strength Frequency   Abiraterone (Zytiga) Take 1000mg (four 250mg tablets) by mouth daily, 1 hour before or 2 hours after eating  Daily   Prednisone 5mg Take daily while on Zytiga        Schedule of oral medication(s):Daily    Verified Consent to Chemotherapy/Biotherapy Cancer Treatment:  form signed by patient and provider:  Yes    RN/PA Verified prescription bottle against physician order: yes    Confirm patient informs his/her Cancer Care team of any treatment received in a setting other than at the [facility] (such as inpatient or outpatient at another hospital or clinic, locally or out of state) so that this medical information can accurately be reflected in his/her medical record.  This vital information will provide an accurate picture for the physician prescribing the current cancer treatment. Yes  Start date of treatment: TBD   How to take your medication(s):  Swallow each tablet whole.  Do not break, crush, or chew, Take 1 hour prior to or 2 hours after a meal, and Avoid grapefruit juice    Plan for appointments and lab testing: Monthly     Missed Dose Management:   If you miss a dose, call your doctor for further instructions.  If you vomit or throw up your medication, call your doctor for further instructions.  Do not take 2 doses at the same time to make up for a missed dose.      Drug / Drug or Drug / Food Interactions:  Grapefruit    Cancer Treatment  Side Effects (refer to Chemo Care Handouts for further information): Allergic reactions  Constipation  Diarrhea  Fatigue  Hair loss  Heart effects  Kidney / Bladder effects  Liver effects  Low red blood cell count / Anemia  Low White Blood Cell Count/Risk of infection  Low Platelet Count/Risk of Bleeding  Lung Effects  Mouth or Throat Sores  Muscle / Bone Effects  Nausea / Vomiting  Skin Effects  Other: Hot flashes    IV administration risks:  Potential leaking of drug outside of vein during administration   Signs/symptoms include redness, swelling, pain, burning at the site of administration  Notify Infusion Nurse immediately if any of these symptoms occur during or after the infusion  Allergic reaction: There is a chance for allergic reaction with some medications.  If your prescribed therapy has a higher risk for this, steps will be taken to prevent and minimize this from occurring.    Recommended anti-nausea medications (as directed by your provider):   Prochloperazine (Compazine) 10 mg every 6 hours      Helpful hints during cancer treatment:    Diet:  Avoid greasy or spicy foods on days surrounding chemotherapy  Eat small frequent meals per day (6-7 meals) rather than 3 large meals  Choose high calorie/high protein foods (chicken, hard cooked eggs, peanut butter, cheese)  If nauseated, try dry foods, such as toast, crackers or pretzels; light or bland foods, such as applesauce or oatmeal.    Fluid intake:  Drink 8-10 cups of liquid a day and take a water bottle wherever you go.  Any fluid is acceptable, but caffeinated products do not count towards your intake and should be limited to 1-2 drinks/day.    Physical Activity  If your doctor approves, be as physically active as you can, but start out slowly, and increase your activity over time as you feel stronger.  Listen to your body and rest when you need to.  Do what you can when you feel up to it.      Oral Care  Keep your mouth clean.  Rinse your mouth  before and after meals with plain water or with a mild mouth rinse (made with 1 quart water, 1 teaspoon salt, and 1 teaspoon baking soda, shake before using)   Avoid commercial mouthwashes that contain alcohol, alcoholic or acidic drinks or tobacco  An acceptable mouthwash is Biotene®   If soreness or sores develop, contact the office.    Diarrhea or Constipation  Imodium A-D use for diarrhea:  Take 2 tabs (4mg) at the first sign of diarrhea; then take 1 tab (2mg) every 2 hours until you have had no diarrhea for at least 12 hours; during the night take 2 tabs (4mg) every 4 hours as needed.  Maximum of 8 tablets in 24 hours.    Constipation: Over-the-counter recommendations include: Senokot, Ducolax, Milk of Magnesia or Miralax (generics are ok)      Skin Care  Avoid direct sunlight.  Wear a broad-spectrum sunscreen with an SPF of 30 or higher on any skin exposed to the sun.  Re-apply every 2 hours if in the sun and after bathing or sweating.  For dry skin, use an alcohol-free lotion twice per day, especially after baths.  If scalp hair loss has occurred, protect the scalp from the sun by wearing a hat and use sunscreen.  Apply alcohol-free moisturizer as needed.      When to call the doctor:  Fever of 100.4 or greater or shaking chills  Nausea/vomiting not controlled with anti-nausea medications: Unable to drink for 24 hours or have signs of dehydration: tiredness, thirst, dry mouth, dark and decreased amount of urine  Diarrhea - not controlled with Imodium AD or more than 6 episodes in 24 hours  Constipation -no bowel movement x 3 days, no response to stool softeners or laxatives  Mouth sores, sore throat or blisters on the lips affecting oral intake  Difficulty breathing, chest pain, or new cough  Excessive tiredness or weakness, confusion or loss of balance  New rash  Tingling or burning, redness, swelling of the palms of hands or soles of feet  Sudden new unexpected symptom -such as change in vision, swelling in  arm or leg  Increase in numbness and tingling of hands or feet  Unusual bleeding (nose bleeds, blood in urine, stool or phlegm)  Pain with urination  Persistent mood changes, depression, nervousness, difficulty sleeping   Pain, redness, swelling or blistering at the IV site  If you go to Emergency Room for any reason or seek medical attention elsewhere  If you should need to cancel or reschedule any visit, it is important that you contact the office    What Phone Number to Call:   [facility] [phone] / Triage Nurse    Teaching Materials Provided:   Chemo Care chemotherapy information sheets     Please refer to the following link if you are interested in additional information about chemotherapy for yourself or family members: [email or web address]      Safety and Handling of Chemotherapy  While you or your family member is receiving chemotherapy, whether in the clinic or at home, the following precautions must be taken to lessen any exposure to the medication.     Handling Body Waste:   Caregivers must wear gloves if exposed to the patient’s blood, urine, stool or vomit.  Dispose of the used gloves after each use and wash hands with soap and water.  Any sheets or clothes soiled with the bodily fluids should be machine washed twice in hot water with regular laundry detergent.  Do not wash soiled garments with hands.  If the soiled garments cannot be washed right away, place them in a sealed plastic bag until they can be washed.   Absorbable undergarments, or any other items contaminated with chemotherapy, should be placed in a sealed plastic bag for disposal, separate from other trash.  Toilets should be flushed twice with the lid closed while taking this medication and for 48 hours after the last dose.  Wash your hands after using the toilet.  Wash any skin area that has come in contact with urine or stool.    Safety for my family and friends:  Due to safety concerns and the  nature of this treatment environment, children are not permitted in the infusion center.  Please make appropriate arrangements.  Hugging and kissing is safe for you and your partner or family members.  You can visit, sit with, hug and kiss the children in your life.    You can be around pregnant women, though (if possible) they should not clean up any of your body fluids after you have treatment.   Sexual activity is safe while throughout treatment.  It is possible that traces of the oral chemotherapy may be present in vaginal fluid or semen for 48 hours after taking.  A condom should be used during this time.  Effective birth control should be used throughout treatment to prevent pregnancy while on these medications and for several months or years after therapy.  Chemotherapy can have harmful side effects to the fetus, especially in the first trimester.  In addition, menstrual cycles can become irregular during and after treatment, so you may not know if you are at a time in your cycle when you could become pregnant or if you are actually pregnant.    Handling oral medication:    Confirm that the medication is appropriately labeled.  Only patients who need chemotherapy should take or touch the medication.    If caregivers are involved, caregivers should wear gloves when administering the medication to protect against exposure.  Discard used gloves immediately - do not use for anything else.  Wash hands thoroughly before and after contact with this medication.  Women of child bearing age, pregnant women or women who are nursing should not handle this medication or any contaminated waste.     If the medication is provided in a blister pack, care should be taken when opening the packet to avoid breathing in any powder that may be aerosolized.   Do not crush, break or open any pills or capsules unless instructed by your doctor.  Do not chew tablets.      Storage:   Store medication in sealed containers, out of reach of  children and pets.  Do not store medication in the bathroom, as high humidity may damage the medication.    Check the medication label to confirm if medication should be stored in the refrigerator or away from light.  Store the medicine container inside another container or in a sealed bag.  Make sure it does not touch any food.    The medication should remain in its original packaging until it is ready for ingestion.  Pill containers can be used but should not be used for other medications, and as few people as possible should come in contact with it.  Empty pill containers should be washed with soap and water or disposed within a plastic bag.    Disposal:  The unused medication should not be flushed down the toilet or placed in the trash (preventing contamination of landfills and water supply).  Please contact your local police department for collection of unused prescription medications.

## 2024-09-04 NOTE — TELEPHONE ENCOUNTER
----- Message from Amira Ko sent at 9/4/2024 12:45 PM CDT -----  Hi Dr. Martinez  Thanks for seeing this patient in consult. If there is anything you need from us in Urology please do not hesitate to reach out.    Amira    Urology team -   Please convey the message below to the patient as he was unable to make an appointment with me due to a referral issue.    AR      Hi Mr. Rao  I was hoping to see you in the clinic to discuss your PET scan, but apparently I was told there was some issue with this due to lack of referral. I just tried calling you to discuss the results since I could not do an official appointment as I would have liked. I apologize for all the back and forth. I hope you received my message. Overall, I think everything you have done so far is appropriate. Thanks for seeing the medical oncologist as we had discussed. I see you are also seeing radiation oncology per their recommendations, which is great news as well. If they or you need anything else from us (urology), please do not hesitate to reach out.    Stay well and please keep me updated  Dr. Ko

## 2024-09-04 NOTE — TELEPHONE ENCOUNTER
- MCM seen by pt   Hi Mr. aRo  I was hoping to see you in the clinic to discuss your PET scan, but apparently I was told there was some issue with this due to lack of referral. I just tried calling you to discuss the results since I could not do an official appointment as I would have liked. I apologize for all the back and forth. I hope you received my message. Overall, I think everything you have done so far is appropriate. Thanks for seeing the medical oncologist as we had discussed. I see you are also seeing radiation oncology per their recommendations, which is great news as well. If they or you need anything else from us (urology), please do not hesitate to reach out.     Stay well and please keep me updated  Dr. Ko   Written by Amira Ko MD on 9/4/2024 12:45 PM CDT  Seen by patient Sin Rao on 9/4/2024 12:45 PM

## 2024-09-04 NOTE — TELEPHONE ENCOUNTER
Spoke to [facility]- confirmed with Beverly Prednisone is being filled at local pharmacy.

## 2024-09-04 NOTE — TELEPHONE ENCOUNTER
Called [facility] back regarding there question about patient taking Prednisone. Per Naomi at [facility], they did not call our office, and there is no prescription on file for him yet.

## 2024-09-04 NOTE — TELEPHONE ENCOUNTER
[facility]  Pharmacy is calling to ask if the patient is taking prednisone with    Disp Refills Start End    Abiraterone Acetate 250 MG Oral Tab        Called 9/4/24. MP

## 2024-09-04 NOTE — PROGRESS NOTES
Medication Education Record:  IV/Oral Cancer    Learner:  Patient    Barriers / Limitations:  None    Psychosocial Assessment:  patient psychosocial response appropriate    Diagnosis:   Prostate Cancer    Readiness of the patient to learn and adhere to oral cancer treatment outside of the cancer center:  Yes    IV Cancer Treatment Name(s): Leuprolide Acetate (Eligard)  IV Cancer Treatment Frequency Every 6 months    Number of cycles planned Based on tolerance and response    Oral Cancer Treatment Medication(s):  Medication Name Dose/Strength Frequency   Abiraterone (Zytiga) Take 1000mg (four 250mg tablets) by mouth daily, 1 hour before or 2 hours after eating  Daily   Prednisone 5mg Take daily while on Zytiga        Schedule of oral medication(s):Daily    Verified Consent to Chemotherapy/Biotherapy Cancer Treatment:  form signed by patient and provider:  Yes    RN/PA Verified prescription bottle against physician order: yes    Confirm patient informs his/her Cancer Care team of any treatment received in a setting other than at the [facility] (such as inpatient or outpatient at another hospital or clinic, locally or out of state) so that this medical information can accurately be reflected in his/her medical record.  This vital information will provide an accurate picture for the physician prescribing the current cancer treatment. Yes  Start date of treatment: TBD   How to take your medication(s):  Swallow each tablet whole.  Do not break, crush, or chew, Take 1 hour prior to or 2 hours after a meal, and Avoid grapefruit juice    Plan for appointments and lab testing: Monthly     Missed Dose Management:   If you miss a dose, call your doctor for further instructions.  If you vomit or throw up your medication, call your doctor for further instructions.  Do not take 2 doses at the same time to make up for a missed dose.      Drug / Drug or Drug / Food Interactions:  Grapefruit    Cancer Treatment  Side Effects (refer to Chemo Care Handouts for further information): Allergic reactions  Constipation  Diarrhea  Fatigue  Hair loss  Heart effects  Kidney / Bladder effects  Liver effects  Low red blood cell count / Anemia  Low White Blood Cell Count/Risk of infection  Low Platelet Count/Risk of Bleeding  Lung Effects  Mouth or Throat Sores  Muscle / Bone Effects  Nausea / Vomiting  Skin Effects  Other: Hot flashes    IV administration risks:  Potential leaking of drug outside of vein during administration   Signs/symptoms include redness, swelling, pain, burning at the site of administration  Notify Infusion Nurse immediately if any of these symptoms occur during or after the infusion  Allergic reaction: There is a chance for allergic reaction with some medications.  If your prescribed therapy has a higher risk for this, steps will be taken to prevent and minimize this from occurring.    Recommended anti-nausea medications (as directed by your provider):   Prochloperazine (Compazine) 10 mg every 6 hours      Helpful hints during cancer treatment:    Diet:  Avoid greasy or spicy foods on days surrounding chemotherapy  Eat small frequent meals per day (6-7 meals) rather than 3 large meals  Choose high calorie/high protein foods (chicken, hard cooked eggs, peanut butter, cheese)  If nauseated, try dry foods, such as toast, crackers or pretzels; light or bland foods, such as applesauce or oatmeal.    Fluid intake:  Drink 8-10 cups of liquid a day and take a water bottle wherever you go.  Any fluid is acceptable, but caffeinated products do not count towards your intake and should be limited to 1-2 drinks/day.    Physical Activity  If your doctor approves, be as physically active as you can, but start out slowly, and increase your activity over time as you feel stronger.  Listen to your body and rest when you need to.  Do what you can when you feel up to it.      Oral Care  Keep your mouth clean.  Rinse your mouth  before and after meals with plain water or with a mild mouth rinse (made with 1 quart water, 1 teaspoon salt, and 1 teaspoon baking soda, shake before using)   Avoid commercial mouthwashes that contain alcohol, alcoholic or acidic drinks or tobacco  An acceptable mouthwash is Biotene®   If soreness or sores develop, contact the office.    Diarrhea or Constipation  Imodium A-D use for diarrhea:  Take 2 tabs (4mg) at the first sign of diarrhea; then take 1 tab (2mg) every 2 hours until you have had no diarrhea for at least 12 hours; during the night take 2 tabs (4mg) every 4 hours as needed.  Maximum of 8 tablets in 24 hours.    Constipation: Over-the-counter recommendations include: Senokot, Ducolax, Milk of Magnesia or Miralax (generics are ok)      Skin Care  Avoid direct sunlight.  Wear a broad-spectrum sunscreen with an SPF of 30 or higher on any skin exposed to the sun.  Re-apply every 2 hours if in the sun and after bathing or sweating.  For dry skin, use an alcohol-free lotion twice per day, especially after baths.  If scalp hair loss has occurred, protect the scalp from the sun by wearing a hat and use sunscreen.  Apply alcohol-free moisturizer as needed.      When to call the doctor:  Fever of 100.4 or greater or shaking chills  Nausea/vomiting not controlled with anti-nausea medications: Unable to drink for 24 hours or have signs of dehydration: tiredness, thirst, dry mouth, dark and decreased amount of urine  Diarrhea - not controlled with Imodium AD or more than 6 episodes in 24 hours  Constipation -no bowel movement x 3 days, no response to stool softeners or laxatives  Mouth sores, sore throat or blisters on the lips affecting oral intake  Difficulty breathing, chest pain, or new cough  Excessive tiredness or weakness, confusion or loss of balance  New rash  Tingling or burning, redness, swelling of the palms of hands or soles of feet  Sudden new unexpected symptom -such as change in vision, swelling in  arm or leg  Increase in numbness and tingling of hands or feet  Unusual bleeding (nose bleeds, blood in urine, stool or phlegm)  Pain with urination  Persistent mood changes, depression, nervousness, difficulty sleeping   Pain, redness, swelling or blistering at the IV site  If you go to Emergency Room for any reason or seek medical attention elsewhere  If you should need to cancel or reschedule any visit, it is important that you contact the office    What Phone Number to Call:   [facility] [phone] / Triage Nurse    Teaching Materials Provided:   Chemo Care chemotherapy information sheets     Please refer to the following link if you are interested in additional information about chemotherapy for yourself or family members: [email or web address]      Safety and Handling of Chemotherapy  While you or your family member is receiving chemotherapy, whether in the clinic or at home, the following precautions must be taken to lessen any exposure to the medication.     Handling Body Waste:   Caregivers must wear gloves if exposed to the patient’s blood, urine, stool or vomit.  Dispose of the used gloves after each use and wash hands with soap and water.  Any sheets or clothes soiled with the bodily fluids should be machine washed twice in hot water with regular laundry detergent.  Do not wash soiled garments with hands.  If the soiled garments cannot be washed right away, place them in a sealed plastic bag until they can be washed.   Absorbable undergarments, or any other items contaminated with chemotherapy, should be placed in a sealed plastic bag for disposal, separate from other trash.  Toilets should be flushed twice with the lid closed while taking this medication and for 48 hours after the last dose.  Wash your hands after using the toilet.  Wash any skin area that has come in contact with urine or stool.    Safety for my family and friends:  Due to safety concerns and the  nature of this treatment environment, children are not permitted in the infusion center.  Please make appropriate arrangements.  Hugging and kissing is safe for you and your partner or family members.  You can visit, sit with, hug and kiss the children in your life.    You can be around pregnant women, though (if possible) they should not clean up any of your body fluids after you have treatment.   Sexual activity is safe while throughout treatment.  It is possible that traces of the oral chemotherapy may be present in vaginal fluid or semen for 48 hours after taking.  A condom should be used during this time.  Effective birth control should be used throughout treatment to prevent pregnancy while on these medications and for several months or years after therapy.  Chemotherapy can have harmful side effects to the fetus, especially in the first trimester.  In addition, menstrual cycles can become irregular during and after treatment, so you may not know if you are at a time in your cycle when you could become pregnant or if you are actually pregnant.    Handling oral medication:    Confirm that the medication is appropriately labeled.  Only patients who need chemotherapy should take or touch the medication.    If caregivers are involved, caregivers should wear gloves when administering the medication to protect against exposure.  Discard used gloves immediately - do not use for anything else.  Wash hands thoroughly before and after contact with this medication.  Women of child bearing age, pregnant women or women who are nursing should not handle this medication or any contaminated waste.     If the medication is provided in a blister pack, care should be taken when opening the packet to avoid breathing in any powder that may be aerosolized.   Do not crush, break or open any pills or capsules unless instructed by your doctor.  Do not chew tablets.      Storage:   Store medication in sealed containers, out of reach of  children and pets.  Do not store medication in the bathroom, as high humidity may damage the medication.    Check the medication label to confirm if medication should be stored in the refrigerator or away from light.  Store the medicine container inside another container or in a sealed bag.  Make sure it does not touch any food.    The medication should remain in its original packaging until it is ready for ingestion.  Pill containers can be used but should not be used for other medications, and as few people as possible should come in contact with it.  Empty pill containers should be washed with soap and water or disposed within a plastic bag.    Disposal:  The unused medication should not be flushed down the toilet or placed in the trash (preventing contamination of landfills and water supply).  Please contact your local police department for collection of unused prescription medications.    30 minute appointment spent on education and counseling on above plan along with supportive care

## 2024-09-06 ENCOUNTER — TELEPHONE (OUTPATIENT)
Dept: RADIATION ONCOLOGY | Facility: HOSPITAL | Age: 74
End: 2024-09-06

## 2024-09-06 ENCOUNTER — TELEPHONE (OUTPATIENT)
Dept: HEMATOLOGY/ONCOLOGY | Facility: HOSPITAL | Age: 74
End: 2024-09-06

## 2024-09-06 NOTE — TELEPHONE ENCOUNTER
LM for Sin that I spoke with Ketty from prior auth and Lyndon will work with him to fill out patient assistance forms so it is safer for him to share his social security number with [facility] when he fills out that paperwork. Left number for him to call back if he had any further questions.

## 2024-09-09 ENCOUNTER — TELEPHONE (OUTPATIENT)
Dept: CASE MANAGEMENT | Age: 74
End: 2024-09-09

## 2024-09-09 ENCOUNTER — TELEPHONE (OUTPATIENT)
Dept: HEMATOLOGY/ONCOLOGY | Facility: HOSPITAL | Age: 74
End: 2024-09-09

## 2024-09-09 DIAGNOSIS — C61 PROSTATE CANCER (HCC): Primary | ICD-10-CM

## 2024-09-09 PROBLEM — H43.393 VITREOUS FLOATERS OF BOTH EYES: Status: RESOLVED | Noted: 2019-10-22 | Resolved: 2024-09-09

## 2024-09-09 NOTE — TELEPHONE ENCOUNTER
LM for Sin to call me back with an update on Abiraterone from [facility] to see if he filled out any pt assistance forms. Left number for him to call back.

## 2024-09-09 NOTE — TELEPHONE ENCOUNTER
Dr. Lerma,     Cancer Center requesting referral to Dr. Fang.     Pended referral please review diagnosis and sign off if you agree.    Thank you.  Christin Carrillo  [facility]

## 2024-09-09 NOTE — TELEPHONE ENCOUNTER
Need clarification    AM I his PCP  I singed the order   Looks like I have no encounter with this patient  Cancel the referral and verify

## 2024-09-10 ENCOUNTER — OFFICE VISIT (OUTPATIENT)
Dept: INTERNAL MEDICINE CLINIC | Facility: CLINIC | Age: 74
End: 2024-09-10
Payer: MEDICARE

## 2024-09-10 ENCOUNTER — TELEPHONE (OUTPATIENT)
Dept: SURGERY | Facility: CLINIC | Age: 74
End: 2024-09-10

## 2024-09-10 ENCOUNTER — TELEPHONE (OUTPATIENT)
Dept: HEMATOLOGY/ONCOLOGY | Facility: HOSPITAL | Age: 74
End: 2024-09-10

## 2024-09-10 ENCOUNTER — LAB ENCOUNTER (OUTPATIENT)
Dept: LAB | Age: 74
End: 2024-09-10
Attending: INTERNAL MEDICINE
Payer: MEDICARE

## 2024-09-10 VITALS
SYSTOLIC BLOOD PRESSURE: 110 MMHG | BODY MASS INDEX: 31.63 KG/M2 | HEART RATE: 64 BPM | WEIGHT: 231 LBS | HEIGHT: 71.5 IN | DIASTOLIC BLOOD PRESSURE: 68 MMHG

## 2024-09-10 DIAGNOSIS — R73.03 PREDIABETES: ICD-10-CM

## 2024-09-10 DIAGNOSIS — I70.0 AORTIC ATHEROSCLEROSIS (HCC): ICD-10-CM

## 2024-09-10 DIAGNOSIS — C61 PROSTATE CANCER (HCC): ICD-10-CM

## 2024-09-10 DIAGNOSIS — I10 ESSENTIAL HYPERTENSION: Primary | ICD-10-CM

## 2024-09-10 DIAGNOSIS — E78.5 HYPERLIPIDEMIA, UNSPECIFIED HYPERLIPIDEMIA TYPE: ICD-10-CM

## 2024-09-10 DIAGNOSIS — C77.8: ICD-10-CM

## 2024-09-10 LAB
ANION GAP SERPL CALC-SCNC: 5 MMOL/L (ref 0–18)
BUN BLD-MCNC: 20 MG/DL (ref 9–23)
BUN/CREAT SERPL: 17.9 (ref 10–20)
CALCIUM BLD-MCNC: 9.9 MG/DL (ref 8.7–10.4)
CHLORIDE SERPL-SCNC: 107 MMOL/L (ref 98–112)
CHOLEST SERPL-MCNC: 154 MG/DL (ref ?–200)
CO2 SERPL-SCNC: 31 MMOL/L (ref 21–32)
CREAT BLD-MCNC: 1.12 MG/DL
EGFRCR SERPLBLD CKD-EPI 2021: 69 ML/MIN/1.73M2 (ref 60–?)
EST. AVERAGE GLUCOSE BLD GHB EST-MCNC: 134 MG/DL (ref 68–126)
FASTING PATIENT LIPID ANSWER: NO
FASTING STATUS PATIENT QL REPORTED: NO
GLUCOSE BLD-MCNC: 102 MG/DL (ref 70–99)
HBA1C MFR BLD: 6.3 % (ref ?–5.7)
HDLC SERPL-MCNC: 40 MG/DL (ref 40–59)
LDLC SERPL CALC-MCNC: 97 MG/DL (ref ?–100)
NONHDLC SERPL-MCNC: 114 MG/DL (ref ?–130)
OSMOLALITY SERPL CALC.SUM OF ELEC: 299 MOSM/KG (ref 275–295)
POTASSIUM SERPL-SCNC: 4.1 MMOL/L (ref 3.5–5.1)
SODIUM SERPL-SCNC: 143 MMOL/L (ref 136–145)
T4 FREE SERPL-MCNC: 1.1 NG/DL (ref 0.8–1.7)
TRIGL SERPL-MCNC: 91 MG/DL (ref 30–149)
TSI SER-ACNC: 0.61 MIU/ML (ref 0.55–4.78)
VLDLC SERPL CALC-MCNC: 15 MG/DL (ref 0–30)

## 2024-09-10 PROCEDURE — 80061 LIPID PANEL: CPT

## 2024-09-10 PROCEDURE — 80048 BASIC METABOLIC PNL TOTAL CA: CPT

## 2024-09-10 PROCEDURE — 3074F SYST BP LT 130 MM HG: CPT | Performed by: INTERNAL MEDICINE

## 2024-09-10 PROCEDURE — 84439 ASSAY OF FREE THYROXINE: CPT

## 2024-09-10 PROCEDURE — 99214 OFFICE O/P EST MOD 30 MIN: CPT | Performed by: INTERNAL MEDICINE

## 2024-09-10 PROCEDURE — 3078F DIAST BP <80 MM HG: CPT | Performed by: INTERNAL MEDICINE

## 2024-09-10 PROCEDURE — 84443 ASSAY THYROID STIM HORMONE: CPT

## 2024-09-10 PROCEDURE — 83036 HEMOGLOBIN GLYCOSYLATED A1C: CPT

## 2024-09-10 PROCEDURE — 1126F AMNT PAIN NOTED NONE PRSNT: CPT | Performed by: INTERNAL MEDICINE

## 2024-09-10 PROCEDURE — 3008F BODY MASS INDEX DOCD: CPT | Performed by: INTERNAL MEDICINE

## 2024-09-10 PROCEDURE — 1159F MED LIST DOCD IN RCRD: CPT | Performed by: INTERNAL MEDICINE

## 2024-09-10 PROCEDURE — 36415 COLL VENOUS BLD VENIPUNCTURE: CPT

## 2024-09-10 NOTE — PROGRESS NOTES
HPI:    Patient ID: Sin Rao is a 73 year old male.    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  Wt Readings from Last 6 Encounters:   08/30/24 230 lb (104.3 kg)   04/17/24 238 lb (108 kg)   11/11/22 237 lb (107.5 kg)   09/07/22 235 lb (106.6 kg)   07/21/22 235 lb 11.2 oz (106.9 kg)   04/28/22 237 lb (107.5 kg)     There is no height or weight on file to calculate BMI.  HGBA1C:    Lab Results   Component Value Date    A1C 6.5 (H) 04/17/2024    A1C 6.1 (H) 07/21/2022    A1C 6.1 (A) 04/28/2022     (H) 04/17/2024         Review of Systems   Constitutional:  Negative for activity change, chills, fatigue and fever.   HENT:  Negative for ear discharge, nosebleeds, postnasal drip, rhinorrhea, sinus pressure and sore throat.    Eyes:  Negative for pain, discharge and redness.   Respiratory:  Negative for cough, chest tightness, shortness of breath and wheezing.    Cardiovascular:  Negative for chest pain, palpitations and leg swelling.   Gastrointestinal:  Negative for abdominal pain, blood in stool, constipation, diarrhea, nausea and vomiting.   Genitourinary:  Negative for difficulty urinating, dysuria, frequency, hematuria and urgency.   Musculoskeletal:  Negative for back pain, gait problem and joint swelling.   Skin:  Negative for rash.   Neurological:  Negative for syncope, weakness, light-headedness and headaches.   Psychiatric/Behavioral:  Negative for dysphoric mood. The patient is not nervous/anxious.          Current Outpatient Medications   Medication Sig Dispense Refill    Abiraterone Acetate 250 MG Oral Tab Take 1,000 mg by mouth daily. Take 4 capsules one hour before or two hours after a meal. 120 tablet 11    prochlorperazine (COMPAZINE) 10 mg tablet Take 1 tablet (10 mg  total) by mouth every 6 (six) hours as needed for Nausea. 30 tablet 3    atorvastatin 10 MG Oral Tab Take 1 tablet (10 mg total) by mouth nightly. 90 tablet 0    predniSONE 5 MG Oral Tab Take 1 tablet (5 mg total) by mouth daily. 30 tablet 11    bicalutamide 50 MG Oral Tab Take 1 tablet (50 mg total) by mouth daily. 30 tablet 0    tamsulosin 0.4 MG Oral Cap Take 1 capsule (0.4 mg total) by mouth daily. Take 1/2 hour following the same meal each day 90 capsule 3    hydroCHLOROthiazide 25 MG Oral Tab Take 1 tablet (25 mg total) by mouth daily. 90 tablet 3    olmesartan 20 MG Oral Tab Take 1 tablet (20 mg total) by mouth daily. 90 tablet 3    aspirin 81 MG Oral Tab Take 1 tablet (81 mg total) by mouth daily.       Allergies:No Known Allergies    HISTORY:    HISTORY OF PROSTATE CA  7/17/24 PSA 6/3/24 was elevated to 8.17, which prompted an MRI that showed a PI-RADS 3 lesion within the right anterior transitional zone as well as a highly suspicious left obturator lymph node that was enlarged.  Seminal vesicles were unremarkable.     8/7/2024 patient underwent a CT-guided core biopsy of the left pelvic lymph node which showed multiple fragments of lymph node with metastatic poorly differentiated adenocarcinoma compatible with prostate primary.  This was positive for CK8/18 CK20 and PSA negative for CK7 and TTF-1.     8/28/2024 the patient underwent a PSMA PET scan that showed marked heterogeneous activity demonstrated throughout the prostate gland and suspected local invasion.  Multifocal hypermetabolic intrapelvic and retroperitoneal lymph nodes compatible extensive metastatic disease.(Including multiple anterior aortocaval and pericaval lymph nodes as well as retrocaval lymphadenopathy.]  There is also appear to be hypermetabolic left supraclavicular lymph node as well as a mildly hypermetabolic right middle lobe pulmonary nodule that was concerning for metastatic disease.   CONCLUSION: PET PSMA FOR PROSTATE CARCINOMA  (CPT=78815) [[id number]] (Accession [id number]) (Order [id number])  Exam Date: 08/28/2024  Procedure: PET PSMA FOR PROSTATE CARCINOMA (CPT=78815)  1. Multifocal hypermetabolic intrapelvic and retroperitoneal lymph nodes, compatible with extensive metastatic disease.      2. Marked heterogeneous activity is demonstrated throughout the prostate gland with suspected local invasive behavior, potentially extending into the seminal vesicles bilaterally.      3. A hypermetabolic left supraclavicular lymph node is likely metastatic.      4. A mildly hypermetabolic right middle lobe pulmonary nodule is concerning for metastatic disease.      5. Ascending thoracic aortic dilatation. Continued surveillance is suggested.      6. Dilatation of the main pulmonary artery trunk may relate to underlying pulmonary hypertension.       7. Large hiatal hernia with substantial intrathoracic herniation of the stomach.      8. Sequela of remote granulomatous disease.   Cardiology visit 6/17/24  73-year-old male presents for follow-up visit. Referred for abnormal EKG. Show right bundle branch block no previous EKGs and sinus bradycardia.   . Hyperlipidemia  Continue atorvastatin 10 mg daily  2. Hypertension  Well-controlled on combination of hydrochlorothiazide and olmesartan continue. Low-salt diet.  3. Palpitations  2-week MCT monitor shows no atrial fibrillation. Cardiac PET scan normal perfusion and preserved EF on echocardiogram  4. Murmur  Calcified aortic valve no evidence of aortic stenosis.  5. Edema start Lasix as needed 20 mg with 20 mEq of potassium chloride  Plan  start Lasix as needed 20 mg with 20 mEq of potassium chloride 30 tablets with 2 refills.  4 month f/u   ameResultDateLocation -     Ordered ByMyocardial Perfusion Imaging1.Maximal exercise treadmill test (MPHR : 94 %).2.The functional capacity is fair (6.6 METs).3.Rare PVCs. Occasional PACs, rare atrial couplet, isolated 4 beat run atrial tachycardia.4.No chest  pain5.Stress EKG is normal. 1.This is a normal perfusion study, no perfusion defects noted. 2.The left ventricular cavity is noted to be normal on the stress study. The left ventricular ejection fraction was calculated to be 83% and left ventricular global function is hyperkinetic. This finding may be secondary to small ventricular size. 3.The study quality is average.5/22/2024 7:30:00 Raúl Zuniga MDTrans Thoracic Echocardiogram1.The study quality is average. 2.The left ventricle is normal in size, wall thickness and wall motion; there are no regional wall motion anbormallities. Global left ventricular systolic function is normal. The left ventricular ejection fraction is 65%. Left ventricular diastolic function is normal.3.The right ventricle is normal in size. Right ventricular systolic function is normal.4.Ascending aorta diameter is 3.6 cms. 5.Mild tricuspid regurgitation.6.Mild pulmonic regurgitation.6/11/2024 12:30:00 [facility]1.This is an excellent quality study. 2.Predominant rhythm is normal sinus rhythm. 3.The minimum heart rate recorded was 48 beats / minute. The maximum heart rate is 104 beats / minute. The mean heart rate is 70 beats / minute. 4.No evidence of AV block is noted. 5.Rare premature atrial contractions noted. 6.No evidence of atrial fibrillation noted. 7.Rare premature ventricular contractions noted. 8.No evidence of ventricular tachycardia is noted.9.No pauses were noted.5/7/2024 11:00:00 Raúl Zuniga MD   Past Surgical History:   Procedure Laterality Date    Hernia surgery  2008, 2010      Family History   Problem Relation Age of Onset    Hypertension Mother     Hypertension Sister     Hypertension Brother     Hypertension Brother     Heart Disorder Brother     Diabetes Neg     Glaucoma Neg     Macular degeneration Neg       Social History:   Social History     Socioeconomic History    Marital status: Single   Tobacco Use    Smoking status:  Never    Smokeless tobacco: Never   Vaping Use    Vaping status: Never Used   Substance and Sexual Activity    Alcohol use: Yes     Comment: 1x/month    Drug use: No        PHYSICAL EXAM:    Physical Exam  Constitutional:       Appearance: He is well-developed. He is not ill-appearing.   HENT:      Right Ear: Ear canal normal.      Left Ear: Ear canal normal.      Mouth/Throat:      Pharynx: Oropharynx is clear.   Eyes:      Extraocular Movements: Extraocular movements intact.      Conjunctiva/sclera: Conjunctivae normal.      Pupils: Pupils are equal, round, and reactive to light.   Cardiovascular:      Rate and Rhythm: Normal rate and regular rhythm.      Heart sounds: Normal heart sounds.   Pulmonary:      Effort: Pulmonary effort is normal.      Breath sounds: Normal breath sounds.   Abdominal:      General: Bowel sounds are normal.      Palpations: Abdomen is soft.      Tenderness: There is no abdominal tenderness.   Skin:     General: Skin is warm and dry.   Neurological:      Mental Status: He is alert.      Deep Tendon Reflexes: Reflexes are normal and symmetric.   Psychiatric:         Mood and Affect: Mood normal.              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)  Plan: ongong treament per oncology  Evalutaion diagnosis and planned treatment reivewed    (I70.0) Aortic atherosclerosis (HCC)  Plan: Asymptomatic  monitor      (R73.03) Prediabetes  Plan: Hemoglobin A1C, Basic Metabolic Panel (8)        Healthy diet avoid sweets    (E78.5) Hyperlipidemia, unspecified hyperlipidemia type  Plan: Lipid Panel, TSH and Free  T4        Low cholesterol diet advised  Avoid saturated and trans fats     Patient voiced understanding  and agrees with plan          Meds This Visit:  Requested Prescriptions      No prescriptions requested or ordered in this encounter       Imaging & Referrals:  None        ID#[id number]

## 2024-09-10 NOTE — TELEPHONE ENCOUNTER
Received notice of approval. For CT Scan and Gallium ga-[id number] psma-11.    Authorization number: [id number]  Dates: 8/21/2024-10/30/2024  Service code: [id number],     Will add to scanning bin.

## 2024-09-10 NOTE — TELEPHONE ENCOUNTER
Patient is returning Dr. Martinez's nurse's call. Please call patient back at [phone]. Called 9/10/24 GA

## 2024-09-11 ENCOUNTER — TELEPHONE (OUTPATIENT)
Dept: HEMATOLOGY/ONCOLOGY | Facility: HOSPITAL | Age: 74
End: 2024-09-11

## 2024-09-11 NOTE — TELEPHONE ENCOUNTER
Pt called asked to speak to DOMINGO Hogue     I attempted to reach the nurse    The pt stated the medication is coming via mail in about 5days

## 2024-09-11 NOTE — TELEPHONE ENCOUNTER
Called patient back, he will be receiving his Abiraterone in the mail in about 5 days, his copay is $95, he is able to afford that. He will call me back when he gets it in the mail so I know the start date. Instructed him when he is here next week for Lupron injection to make a follow up with Dr. Martinez for early October he voiced understanding and thanked me for the call.

## 2024-09-17 ENCOUNTER — NURSE ONLY (OUTPATIENT)
Dept: HEMATOLOGY/ONCOLOGY | Facility: HOSPITAL | Age: 74
End: 2024-09-17
Attending: INTERNAL MEDICINE
Payer: MEDICARE

## 2024-09-17 DIAGNOSIS — C61 PROSTATE CANCER (HCC): Primary | ICD-10-CM

## 2024-09-17 PROCEDURE — 96402 CHEMO HORMON ANTINEOPL SQ/IM: CPT

## 2024-09-17 NOTE — PROGRESS NOTES
Pt received drug Abiraterone today, will start it tomorrow 9/18. Answered patient's questions to the best of my ability. He will call if any issues arise.

## 2024-09-17 NOTE — PROGRESS NOTES
Pt here for 6 month Leuprolide injection  Reviewed medication pot SE's and symptom mgmt (e.g. hot flashes) and when to call MD  He will return next month for labs and dr alyssa Hogue RN came to speak with pt about his medications and follow up appts    Leuprolide injection given, tolerated well    Discharged stable to home with future appts  Gait steady, indep

## 2024-09-24 ENCOUNTER — OFFICE VISIT (OUTPATIENT)
Dept: RADIATION ONCOLOGY | Facility: HOSPITAL | Age: 74
End: 2024-09-24
Attending: RADIOLOGY
Payer: MEDICARE

## 2024-09-24 VITALS
SYSTOLIC BLOOD PRESSURE: 127 MMHG | HEART RATE: 62 BPM | WEIGHT: 229.19 LBS | OXYGEN SATURATION: 97 % | BODY MASS INDEX: 32 KG/M2 | DIASTOLIC BLOOD PRESSURE: 57 MMHG | TEMPERATURE: 98 F | RESPIRATION RATE: 18 BRPM

## 2024-09-24 DIAGNOSIS — C61 PROSTATE CANCER (HCC): Primary | ICD-10-CM

## 2024-09-24 PROCEDURE — 99212 OFFICE O/P EST SF 10 MIN: CPT

## 2024-09-24 NOTE — CONSULTS
RADIATION ONCOLOGY NOTE    DATE OF VISIT: 9/24/2024    DIAGNOSIS :  LN+ prostate cancer, on ADT/ARSI, PSA 19.24 also with possible chest LN, for consideration of XRT     Dear Maikel Khan Ariza  and colleagues,    Per our discussion, As you recall, Mr. Sin Rao is a pleasant 73 year old male, recently dx with LN+ prostate cancer, s/p biopsy for L pelvic node.  Pt presented with abn PSA with 7/17/24 PSA 6/3/24 at 8.17, and MRI +lesion within the right anterior transitional zone as well as a highly suspicious left obturator lymph node that was enlarged.  CT-guided core biopsy of the left pelvic lymph node which showed multiple fragments of lymph node with metastatic poorly differentiated adenocarcinoma compatible with prostate primary.      PSMA PET scan that showed marked heterogeneous activity demonstrated throughout the prostate gland and suspected local invasion.  Multifocal hypermetabolic intrapelvic and retroperitoneal lymph nodes.  There was possible lung dz, mildly hypermetabolic right middle lobe pulmonary nodule as below.  Further w/u was discussed at tumor board and given lower level uptake, further w/u was deferred and .pt has started androgen blockade.            Oncology Chemo Meds          8/7/2024    10:00 Bonus Cycle; Day 1    9/17/2024 13:54   Oncology Flowsheet   leuprolide 45 MG (Lupron Depot 6-Month) IM  45 mg       left upper outer gluteal muscle   sodium chloride 0.9% IV New Bag (unknown dose)       Details          Administered dose cannot be determined              Recent Results (from the past [id number] hour(s))   PSA - DIAGNOSTIC [E]    Collection Time: 08/30/24  9:33 AM   Result Value Ref Range    Total PSA  19.24 (H) <=4.00 ng/mL     *Note: Due to a large number of results and/or encounters for the requested time period, some results have not been displayed. A complete set of results can be found in Results Review.     PSA:    Lab Results   Component Value Date    PSA 19.24 (H)  08/30/2024    PSA 8.69 (H) 06/03/2024    PSA 0.5 12/27/2018       PSA Screen:    Lab Results   Component Value Date    PSAS 8.17 (H) 04/17/2024    PSAS 2.07 07/21/2022    PSAS 0.82 07/14/2021    PSAS 0.59 10/31/2019       PET PSMA FOR PROSTATE CARCINOMA (CPT=78815)    Result Date: 8/28/2024  CONCLUSION:  1. Multifocal hypermetabolic intrapelvic and retroperitoneal lymph nodes, compatible with extensive metastatic disease.  2. Marked heterogeneous activity is demonstrated throughout the prostate gland with suspected local invasive behavior, potentially extending into the seminal vesicles bilaterally.  3. A hypermetabolic left supraclavicular lymph node is likely metastatic.  4. A mildly hypermetabolic right middle lobe pulmonary nodule is concerning for metastatic disease.  5. Ascending thoracic aortic dilatation. Continued surveillance is suggested.  6. Dilatation of the main pulmonary artery trunk may relate to underlying pulmonary hypertension.   7. Large hiatal hernia with substantial intrathoracic herniation of the stomach.  8. Sequela of remote granulomatous disease.  9. Lesser incidental findings as above.    elm-remote.   Dictated by (CST): Juanjo Orellana MD on 8/28/2024 at 11:11 PM     Finalized by (CST): Juanjo Orellana MD on 8/28/2024 at 11:28 PM           ROS    A 12 Point review of system was obtained and is as above and per HPI and nursing note.    Review of Systems   Constitutional:  Positive for fatigue.   HENT: Negative.     Eyes: Negative.    Respiratory: Negative.     Cardiovascular: Negative.    Gastrointestinal: Negative.    Endocrine: Negative.    Genitourinary:         Incomplete emptying- has push to get all urine out    Musculoskeletal: Negative.    Skin: Negative.    Allergic/Immunologic: Negative.    Neurological: Negative.    Hematological: Negative.    Psychiatric/Behavioral:  Positive for sleep disturbance.         Due to waking up to use restroom         Current Outpatient Medications    Medication Sig Dispense Refill    Abiraterone Acetate 250 MG Oral Tab Take 1,000 mg by mouth daily. Take 4 capsules one hour before or two hours after a meal. 120 tablet 11    atorvastatin 10 MG Oral Tab Take 1 tablet (10 mg total) by mouth nightly. 90 tablet 0    predniSONE 5 MG Oral Tab Take 1 tablet (5 mg total) by mouth daily. 30 tablet 11    tamsulosin 0.4 MG Oral Cap Take 1 capsule (0.4 mg total) by mouth daily. Take 1/2 hour following the same meal each day 90 capsule 3    hydroCHLOROthiazide 25 MG Oral Tab Take 1 tablet (25 mg total) by mouth daily. 90 tablet 3    olmesartan 20 MG Oral Tab Take 1 tablet (20 mg total) by mouth daily. 90 tablet 3    aspirin 81 MG Oral Tab Take 1 tablet (81 mg total) by mouth daily.      prochlorperazine (COMPAZINE) 10 mg tablet Take 1 tablet (10 mg total) by mouth every 6 (six) hours as needed for Nausea. (Patient not taking: Reported on 9/24/2024) 30 tablet 3    bicalutamide 50 MG Oral Tab Take 1 tablet (50 mg total) by mouth daily. (Patient not taking: Reported on 9/24/2024) 30 tablet 0       PAIN:   , Pain Score: 0,     ,  ,     ,  ,      ALLERGIES :   Allergies   Allergen Reactions    Bees OTHER (SEE COMMENTS)     Swelling       PAST MEDICAL HISTORY:   has a past medical history of Age-related nuclear cataract of both eyes (10/22/2019), Aortic atherosclerosis (HCC), Dyslipidemia, Essential hypertension, Glaucoma suspect of both eyes (10/22/2019), adenomatous colonic polyps (12/2020), Pancolonic diverticulosis, Prediabetes, Pulmonary hypertension (HCC), and Vitreous floaters of both eyes (10/22/2019).    PAST SURGICAL HISTORY:   has a past surgical history that includes hernia surgery (2008, 2010).    PAST SOCIAL HISTORY   reports that he has never smoked. He has never been exposed to tobacco smoke. He has never used smokeless tobacco. He reports current alcohol use. He reports that he does not use drugs.    PAST FAMILY HISTORY   family history includes Heart Disorder in  his brother; Hypertension in his brother, brother, mother, and sister.    Wt Readings from Last 6 Encounters:   09/24/24 104 kg (229 lb 3.2 oz)   09/10/24 104.8 kg (231 lb)   08/30/24 104.3 kg (230 lb)   04/17/24 108 kg (238 lb)   11/11/22 107.5 kg (237 lb)   09/07/22 106.6 kg (235 lb)        PHYSICAL EXAM  Blood pressure 127/57, pulse 62, temperature 97.5 °F (36.4 °C), temperature source Temporal, resp. rate 18, weight 104 kg (229 lb 3.2 oz), SpO2 97%.  PERFORMANCE STATUS 0 , denies PAIN  GENERAL:  Pt is alert and oriented times three in no acute distress.    HEENT:  PERRLA, EOMI,   NECK:  Supple with no  lymphadenopathy.   CHEST:  Clear  ABDOMEN:  Central weight, Soft with no masses.   EXTREMITIES:  There is no upper or lower extremity edema.    NEUROLOGIC:  Cranial nerves II-XII are intact.  Neuro exam is nonfocal.    COMPLETED TESTS:  I have reviewed the patient's clinical, radiographic, pathologic and laboratory studies.    ASSESSMENT/PLAN    LN+ prostate cancer, on ADT/ARSI, PSA 19.24 also with possible chest LN dz.    I discussed the role of XRT to the prostate to pelvic and PA LN, with closely observation of his chest dz and the role of XRT for oligo-met dz if needed.    Pt will continue to follow closely with Dr. Martinez    Pt will also continue radiographic surveillance for the chest dz.      Ideally pt will continue to respond to ADT/ARSI       I have explained the diagnosis, stage, natural history of the disease and the goals of treatment.   The rational, alternatives and all risk were discussed and all questions were answered.    At this time the patient would like to proceed with a course of treatment.  Therefore, I will plan a XRT mapping session in 1-2 weeks and will keep you updated.      Thank you for allowing me to take care of your patient.  Please do not hesitate to contact me directly.    Lyle Fang MD, FACRO  Radiation Oncology  [email or web address]  [phone]    9/24/2024

## 2024-09-24 NOTE — PROGRESS NOTES
Nursing Consultation Note  Patient: Sin Rao  YOB: 1950  Age: 73 year old  Radiation Oncologist: Dr. Lyle Fang  Referring Physician: Cain Lerma  Diagnosis:[unfilled]  Consult Date: 9/24/2024      Chemotherapy: none  Labs: Reviewed  Imaging: Reviewed  Is the patient of child-bearing age?         No  Has the patient received radiation therapy in the past? no  Does the patient have an implantable device?No   Patient has/has had:     1. Assistive Devices: N/A    2. Flu Vaccination: yes    3. Pneumonia Vaccination:  yes    Vital Signs:   Vitals:    09/24/24 1043   BP: 127/57   Pulse: 62   Resp: 18   Temp: 97.5 °F (36.4 °C)   ,   Wt Readings from Last 6 Encounters:   09/24/24 104 kg (229 lb 3.2 oz)   09/10/24 104.8 kg (231 lb)   08/30/24 104.3 kg (230 lb)   04/17/24 108 kg (238 lb)   11/11/22 107.5 kg (237 lb)   09/07/22 106.6 kg (235 lb)       Nursing Note:      Review of Systems   Constitutional:  Positive for fatigue.   HENT: Negative.     Eyes: Negative.    Respiratory: Negative.     Cardiovascular: Negative.    Gastrointestinal: Negative.    Endocrine: Negative.    Genitourinary:         Incomplete emptying- has push to get all urine out    Musculoskeletal: Negative.    Skin: Negative.    Allergic/Immunologic: Negative.    Neurological: Negative.    Hematological: Negative.    Psychiatric/Behavioral:  Positive for sleep disturbance.         Due to waking up to use restroom          Allergies:  Allergies   Allergen Reactions    Bees OTHER (SEE COMMENTS)     Swelling       Current Outpatient Medications   Medication Sig Dispense Refill    Abiraterone Acetate 250 MG Oral Tab Take 1,000 mg by mouth daily. Take 4 capsules one hour before or two hours after a meal. 120 tablet 11    atorvastatin 10 MG Oral Tab Take 1 tablet (10 mg total) by mouth nightly. 90 tablet 0    predniSONE 5 MG Oral Tab Take 1 tablet (5 mg total) by mouth daily. 30 tablet 11    tamsulosin 0.4 MG Oral Cap Take 1 capsule (0.4  mg total) by mouth daily. Take 1/2 hour following the same meal each day 90 capsule 3    hydroCHLOROthiazide 25 MG Oral Tab Take 1 tablet (25 mg total) by mouth daily. 90 tablet 3    olmesartan 20 MG Oral Tab Take 1 tablet (20 mg total) by mouth daily. 90 tablet 3    aspirin 81 MG Oral Tab Take 1 tablet (81 mg total) by mouth daily.      prochlorperazine (COMPAZINE) 10 mg tablet Take 1 tablet (10 mg total) by mouth every 6 (six) hours as needed for Nausea. (Patient not taking: Reported on 9/24/2024) 30 tablet 3    bicalutamide 50 MG Oral Tab Take 1 tablet (50 mg total) by mouth daily. (Patient not taking: Reported on 9/24/2024) 30 tablet 0       Preferred Pharmacy:    [facility] [address] [place] [address] [phone], [phone]  [address] [place] [address]  Phone: [phone] Fax: [phone]    [facility] - [place], [place] - [address] [facility] [phone], [phone] [address] [facility] [address]  Phone: [phone] Fax: [phone]    [facility] - [place], [place] - [address] [place] [address], [facility] [address] [phone], [phone]  [address] [place] [address], [address] [address]  Phone: [phone] Fax: [phone]    [facility] - [place], [place] - [address] [phone], [phone]  [address]  [facility] [address]  Phone: [phone] Fax: [phone]    [facility] (Now [facility]) - [place], [place] - [address] [facility] [phone], [phone]  [address] [facility] [address]  Phone: [phone] Fax: [phone]    [facility] - [place], [place] - [address] [phone], [phone]  [address]  [facility] [address]  Phone: [phone] Fax: [phone]      Past Medical History:    Age-related nuclear cataract of both eyes    Aortic atherosclerosis (HCC)    CXR 12-19    Dyslipidemia    Essential hypertension    Glaucoma  suspect of both eyes    Hx of adenomatous colonic polyps    polyps removed, patient can repeat in 5 years IF remains in good health/chooses to continue screening    Pancolonic diverticulosis    Colonoscopy 12-20    Prediabetes    Pulmonary hypertension (HCC)    Echo 8-21 with mild LVH, EF 65-70%, mild AI, PAP 34 mmHg    Vitreous floaters of both eyes       Past Surgical History:   Procedure Laterality Date    Hernia surgery  2008, 2010       Social History     Socioeconomic History    Marital status: Single     Spouse name: Not on file    Number of children: Not on file    Years of education: Not on file    Highest education level: Not on file   Occupational History    Not on file   Tobacco Use    Smoking status: Never     Passive exposure: Never    Smokeless tobacco: Never   Vaping Use    Vaping status: Never Used   Substance and Sexual Activity    Alcohol use: Yes     Comment: 1x/month    Drug use: No    Sexual activity: Not on file   Other Topics Concern    Not on file   Social History Narrative    Not on file     Social Determinants of Health     Financial Resource Strain: Not on file   Food Insecurity: Not on file   Transportation Needs: Not on file   Physical Activity: Not on file   Stress: Not on file   Social Connections: Not on file   Housing Stability: Not on file       ECOG:  Grade 0 - Fully active, able to carry on all predisease activities without restrictions.      Education:  Yes    Are ADL's met?  Yes  Does patient feel safe in their environment?  Yes  Care decisions:  Patient and/or surrogate IS involved in care decisions.  Advanced directives:  Patient DOES NOT have advanced directives.  Transportation:  Adequate transportation available for expected visits    Pain:   ;Pain Score: 0   ;    ;     Primary language:  English  Language line required?  no  Comprehension Ability:  good  Able to read?  yes  Able to write?  yes  Communication tools:   none  Patient's ability to learn:  good  Readiness to  learn:  Motivated  Learning preferences:  Discussion and Handout  Barriers to learning:  None  Interventions to reduce barriers:  Consult, Face patient when speaking, Provide support/encouragement, Provide printed materials, and Patient to express feelings  Visual aids:  yes  Hearing disability:  no  Dentures:  no  Knowledge Deficit Plan Of Care:    Problem:  Knowledge Deficit    Problems related to:    Radiation therapy    Interventions:  Assess patient knowledge level  Assess knowledge needs  Instruct on the disease process  Instruct on treatment planning  Instruct on radiation therapy appointment scheduling  Instruct on purpose of radiation therapy  Instruct on side effects of radiation therapy    Expected Outcomes:  Knowledge of diagnosis  Knowledge of disease process  Knowledge of care plan  Knowledge of radiation therapy  Knowledge of side effects of radiation therapy and management  Comfortable with knowledge level    Progress Toward Outcome:  Making progress    Pamphlets/Handouts Given to Patient:  Radiation process overview      Plan Of Care:    Problem:  Alteration in elimination    Problems related to:    Side effects of radiation therapy    Interventions:  Medicate for discomfort  Encourage fluids  Limit PO intake in the evening  Assess AUA score- at consult and follow ups    Expected Outcomes:  Improvement of symptoms  Maintain baseline AUA score  Knowledge of plan of care    Progress Toward Outcome:  Making progress    Pamphlets/Handouts Given to Patient:  Radiation therapy symptom management for pelvis / full bladder  Site specific side effects pelvis / full bladder      Patient seen for consultation with Dr. Fang. Vitals stable, patient denies pain currently. Lives at home with his sister. Works as a  for [employer] transportation 3 days per week, On flomax daily which patient feels like helps. AUA= 8, MARCIA= 12. Had first ADT 9/17/24. I explained to the patient that today he would meet Dr. Fang but  while being treated there was a possibility that he might also encounter the physicians who cover for Dr. Fang which are Dr. Orr, Dr. Schneider, and Dr. Gamaliel Reddy. Patient educated on radiation process as well as potential side effects. Also discussed full bladder instructions with patient. Patient states his understanding. CT sim task not sent, consent not signed. Will call patient soon with further plan- will be discussed at MD tumor board. Patient agreeable to plan. Radiation RN number provided in case of additional questions.

## 2024-09-24 NOTE — PATIENT INSTRUCTIONS
- you will receive a call later this week or next week to discuss next steps.     - please call [phone] with radiation questions

## 2024-10-08 ENCOUNTER — APPOINTMENT (OUTPATIENT)
Dept: RADIATION ONCOLOGY | Facility: HOSPITAL | Age: 74
End: 2024-10-08
Attending: RADIOLOGY
Payer: MEDICARE

## 2024-10-08 PROCEDURE — 77334 RADIATION TREATMENT AID(S): CPT | Performed by: RADIOLOGY

## 2024-10-11 PROCEDURE — 77300 RADIATION THERAPY DOSE PLAN: CPT | Performed by: RADIOLOGY

## 2024-10-11 PROCEDURE — 77338 DESIGN MLC DEVICE FOR IMRT: CPT | Performed by: RADIOLOGY

## 2024-10-11 PROCEDURE — 77301 RADIOTHERAPY DOSE PLAN IMRT: CPT | Performed by: RADIOLOGY

## 2024-10-14 DIAGNOSIS — C61 PROSTATE CANCER (HCC): Primary | ICD-10-CM

## 2024-10-15 ENCOUNTER — NURSE ONLY (OUTPATIENT)
Dept: HEMATOLOGY/ONCOLOGY | Facility: HOSPITAL | Age: 74
End: 2024-10-15
Attending: INTERNAL MEDICINE
Payer: MEDICARE

## 2024-10-15 ENCOUNTER — TELEPHONE (OUTPATIENT)
Dept: HEMATOLOGY/ONCOLOGY | Facility: HOSPITAL | Age: 74
End: 2024-10-15

## 2024-10-15 VITALS
OXYGEN SATURATION: 96 % | HEIGHT: 71.5 IN | WEIGHT: 224 LBS | BODY MASS INDEX: 30.67 KG/M2 | DIASTOLIC BLOOD PRESSURE: 67 MMHG | RESPIRATION RATE: 18 BRPM | HEART RATE: 62 BPM | TEMPERATURE: 98 F | SYSTOLIC BLOOD PRESSURE: 120 MMHG

## 2024-10-15 DIAGNOSIS — R23.2 VASOMOTOR FLUSHING: ICD-10-CM

## 2024-10-15 DIAGNOSIS — Z51.81 THERAPEUTIC DRUG MONITORING: ICD-10-CM

## 2024-10-15 DIAGNOSIS — C61 PROSTATE CANCER (HCC): ICD-10-CM

## 2024-10-15 DIAGNOSIS — R91.1 LUNG NODULE: ICD-10-CM

## 2024-10-15 DIAGNOSIS — C61 PROSTATE CANCER (HCC): Primary | ICD-10-CM

## 2024-10-15 DIAGNOSIS — C77.8: ICD-10-CM

## 2024-10-15 LAB
ALBUMIN SERPL-MCNC: 4.5 G/DL (ref 3.2–4.8)
ALBUMIN/GLOB SERPL: 1.6 {RATIO} (ref 1–2)
ALP LIVER SERPL-CCNC: 74 U/L
ALT SERPL-CCNC: 10 U/L
ANION GAP SERPL CALC-SCNC: 4 MMOL/L (ref 0–18)
AST SERPL-CCNC: 16 U/L (ref ?–34)
BASOPHILS # BLD AUTO: 0.03 X10(3) UL (ref 0–0.2)
BASOPHILS NFR BLD AUTO: 0.3 %
BILIRUB SERPL-MCNC: 0.7 MG/DL (ref 0.2–1.1)
BUN BLD-MCNC: 21 MG/DL (ref 9–23)
BUN/CREAT SERPL: 19.1 (ref 10–20)
CALCIUM BLD-MCNC: 9.6 MG/DL (ref 8.7–10.4)
CHLORIDE SERPL-SCNC: 107 MMOL/L (ref 98–112)
CO2 SERPL-SCNC: 31 MMOL/L (ref 21–32)
CREAT BLD-MCNC: 1.1 MG/DL
DEPRECATED RDW RBC AUTO: 44.5 FL (ref 35.1–46.3)
EGFRCR SERPLBLD CKD-EPI 2021: 70 ML/MIN/1.73M2 (ref 60–?)
EOSINOPHIL # BLD AUTO: 0.07 X10(3) UL (ref 0–0.7)
EOSINOPHIL NFR BLD AUTO: 0.7 %
ERYTHROCYTE [DISTWIDTH] IN BLOOD BY AUTOMATED COUNT: 14.4 % (ref 11–15)
GLOBULIN PLAS-MCNC: 2.9 G/DL (ref 2–3.5)
GLUCOSE BLD-MCNC: 96 MG/DL (ref 70–99)
HCT VFR BLD AUTO: 44 %
HGB BLD-MCNC: 14.3 G/DL
IMM GRANULOCYTES # BLD AUTO: 0.03 X10(3) UL (ref 0–1)
IMM GRANULOCYTES NFR BLD: 0.3 %
LYMPHOCYTES # BLD AUTO: 3.54 X10(3) UL (ref 1–4)
LYMPHOCYTES NFR BLD AUTO: 37.7 %
MCH RBC QN AUTO: 27.4 PG (ref 26–34)
MCHC RBC AUTO-ENTMCNC: 32.5 G/DL (ref 31–37)
MCV RBC AUTO: 84.5 FL
MONOCYTES # BLD AUTO: 0.85 X10(3) UL (ref 0.1–1)
MONOCYTES NFR BLD AUTO: 9 %
NEUTROPHILS # BLD AUTO: 4.88 X10 (3) UL (ref 1.5–7.7)
NEUTROPHILS # BLD AUTO: 4.88 X10(3) UL (ref 1.5–7.7)
NEUTROPHILS NFR BLD AUTO: 52 %
OSMOLALITY SERPL CALC.SUM OF ELEC: 297 MOSM/KG (ref 275–295)
PLATELET # BLD AUTO: 278 10(3)UL (ref 150–450)
POTASSIUM SERPL-SCNC: 4 MMOL/L (ref 3.5–5.1)
PROT SERPL-MCNC: 7.4 G/DL (ref 5.7–8.2)
PSA SERPL-MCNC: 2.46 NG/ML (ref ?–4)
RBC # BLD AUTO: 5.21 X10(6)UL
SODIUM SERPL-SCNC: 142 MMOL/L (ref 136–145)
WBC # BLD AUTO: 9.4 X10(3) UL (ref 4–11)

## 2024-10-15 PROCEDURE — 36415 COLL VENOUS BLD VENIPUNCTURE: CPT

## 2024-10-15 PROCEDURE — 99215 OFFICE O/P EST HI 40 MIN: CPT | Performed by: INTERNAL MEDICINE

## 2024-10-15 PROCEDURE — 84153 ASSAY OF PSA TOTAL: CPT

## 2024-10-15 PROCEDURE — 80053 COMPREHEN METABOLIC PANEL: CPT

## 2024-10-15 PROCEDURE — 85025 COMPLETE CBC W/AUTO DIFF WBC: CPT

## 2024-10-15 NOTE — TELEPHONE ENCOUNTER
Discussed with Sin that as the Radiation simulation was completed about a week ago right now RT is just waiting on his insurance to Authorize his plan and then radiation will be reaching out to him to schedule to start RT. Pt voiced understanding. Dr. Martinez also spoke with patient about his PSA result improving. He voiced understanding.

## 2024-10-15 NOTE — PROGRESS NOTES
[facility] Progress Note    Patient Name: Sin Rao   YOB: 1950   Medical Record Number: [id number]   CSN: [id number]   Consulting Physician: Caleb Martinez MD  Referring Physician(s): Dr. Amira Ko MD    Reason for  Visit:  Prostate Cancer   Cancer Staging   Prostate cancer (HCC)  Staging form: Prostate, AJCC 8th Edition  - Clinical: Stage IVB (cT2, cN1, cM1a, PSA: 8.2) - Signed by Caleb Martinez MD on 8/30/2024    Diagnosis  1. Prostate cancer (HCC)    2. 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 Lung nodule    4. Therapeutic drug monitoring      Current Therapy  ADT q6mos  Pat/Prednisone - started 9/2024    Interval History  Patient returns for follow-up.  Since his last visit here he has started ADT with Lupron and abiraterone and prednisone.  He has been tolerating this well.  He reports occasional hot flashes but no new bone pain.   Patient denies any nausea, vomiting, fevers, chills or pain. Pt also denies any bleeding, specifically hematuria, hematemesis or hemoptysis.    He has seen radiation oncology and is planning to start radiation therapy to the prostrate and pelvic and para-aortic lymph nodes.    History of Present Illness:   Sin Rao is a 73 year old male that was seen today in the Cancer Center for recently diagnosed prostate cancer. His oncologic history is detailed below    7/17/24 PSA 6/3/24 was elevated to 8.17, which prompted an MRI that showed a PI-RADS 3 lesion within the right anterior transitional zone as well as a highly suspicious left obturator lymph node that was enlarged.  Seminal vesicles were unremarkable.    8/7/2024 patient underwent a CT-guided core biopsy of the left pelvic lymph node which showed multiple fragments of lymph node with metastatic poorly differentiated adenocarcinoma compatible with prostate primary.  This was positive for CK8/18 CK20 and PSA negative for CK7 and TTF-1.    8/28/2024 the  patient underwent a PSMA PET scan that showed marked heterogeneous activity demonstrated throughout the prostate gland and suspected local invasion.  Multifocal hypermetabolic intrapelvic and retroperitoneal lymph nodes compatible extensive metastatic disease.(Including multiple anterior aortocaval and pericaval lymph nodes as well as retrocaval lymphadenopathy.]  There is also appear to be hypermetabolic left supraclavicular lymph node as well as a mildly hypermetabolic right middle lobe pulmonary nodule that was concerning for metastatic disease.    9/2024 pretreatment PSA had increased to 19.2.  He was started on androgen deprivation therapy with leuprolide and abiraterone and prednisone.    Past Medical History:  Past Medical History:    Age-related nuclear cataract of both eyes    Aortic atherosclerosis (HCC)    CXR 12-19    Dyslipidemia    Essential hypertension    Glaucoma suspect of both eyes    Hx of adenomatous colonic polyps    polyps removed, patient can repeat in 5 years IF remains in good health/chooses to continue screening    Pancolonic diverticulosis    Colonoscopy 12-20    Prediabetes    Pulmonary hypertension (HCC)    Echo 8-21 with mild LVH, EF 65-70%, mild AI, PAP 34 mmHg    Vitreous floaters of both eyes       Past Surgical History:  Past Surgical History:   Procedure Laterality Date    Hernia surgery  2008, 2010       Family Medical History:  Family History   Problem Relation Age of Onset    Hypertension Mother     Hypertension Sister     Hypertension Brother     Hypertension Brother     Heart Disorder Brother     Diabetes Neg     Glaucoma Neg     Macular degeneration Neg        Gyne History:      Social History:  Social History     Socioeconomic History    Marital status: Single     Spouse name: Not on file    Number of children: Not on file    Years of education: Not on file    Highest education level: Not on file   Occupational History    Not on file   Tobacco Use    Smoking status: Never      Passive exposure: Never    Smokeless tobacco: Never   Vaping Use    Vaping status: Never Used   Substance and Sexual Activity    Alcohol use: Yes     Comment: 1x/month    Drug use: No    Sexual activity: Not on file   Other Topics Concern    Not on file   Social History Narrative    Not on file     Social Drivers of Health     Financial Resource Strain: Not on file   Food Insecurity: Not on file   Transportation Needs: Not on file   Physical Activity: Not on file   Stress: Not on file   Social Connections: Not on file   Housing Stability: Not on file       Allergies:   Allergies   Allergen Reactions    Bees OTHER (SEE COMMENTS)     Swelling       Current Medications:  No outpatient medications have been marked as taking for the 10/15/24 encounter (Appointment) with Caleb Martinez MD.       Review of Systems:  A comprehensive 14 point review of systems was completed.  Pertinent positives and negatives noted in the the HPI.     Vital Signs:    Physical Examination:    General: Patient is alert and oriented x 3, not in acute distress.  Psych:  awake and alert, no distress  HEENT: EOMs intact. PERRL. Oropharynx is clear.   Neck: No JVD. No palpable lymphadenopathy. Neck is supple.  Lymphatics: There is no palpable lymphadenopathy throughout in the cervical, supraclavicular, axillary, or inguinal regions.  Chest: Clear to auscultation. No wheezes or rales.  Heart: Regular rate and rhythm. S1S2 normal.  Abdomen: Soft, non tender with good bowel sounds.  No hepatosplenomegaly.  No palpable mass.  Extremities: No edema or calf tenderness.  Neurological: Grossly intact.     Performance Status:  ECOG - 0    Labs:    Lab Results   Component Value Date/Time    WBC 6.8 08/30/2024 09:33 AM    RBC 4.87 08/30/2024 09:33 AM    HGB 13.3 08/30/2024 09:33 AM    HCT 40.3 08/30/2024 09:33 AM    MCV 82.8 08/30/2024 09:33 AM    MCH 27.3 08/30/2024 09:33 AM    MCHC 33.0 08/30/2024 09:33 AM    RDW 14.1 08/30/2024 09:33 AM    NEPRELIM 3.73  08/30/2024 09:33 AM    .0 08/30/2024 09:33 AM       Lab Results   Component Value Date/Time     (H) 09/10/2024 10:25 AM    BUN 20 09/10/2024 10:25 AM    CREATSERUM 1.12 09/10/2024 10:25 AM    GFRNAA 57 (L) 07/21/2022 08:32 AM    CA 9.9 09/10/2024 10:25 AM    ALB 4.1 08/30/2024 09:33 AM     09/10/2024 10:25 AM    K 4.1 09/10/2024 10:25 AM     09/10/2024 10:25 AM    CO2 31.0 09/10/2024 10:25 AM    ALKPHO 56 08/30/2024 09:33 AM    AST 17 08/30/2024 09:33 AM    ALT 8 (L) 08/30/2024 09:33 AM       Imaging:  Reviewed PET/CT films personally with patient    Pathology:  See above    Impression:      ICD-10-CM    1. Prostate cancer (HCC)  C61       2. 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 Lung nodule  R91.1       I had a long discussion with the patient and explained that unfortunately he has prostate cancer that is metastatic to multiple regional and nonregional lymph nodes.    We discussed treatment options, with systemic androgen deprivation therapy along with an oral androgen receptor signaling inhibitor.  He appears to have low burden of metastatic disease with primary nonregional vik involvement  involvement, so I have recommended consideration of radiation therapy to the prostate    He was standing on ADT and abiraterone and prednisone is tolerating this well    Plan:  S/p leuprolide. Continue abiraterone/prednisone  XRT to prostate/pelvic LN per Dr. Fang of Radiation Oncology  Lung nodule: unclear etiology, will plan  surveillance repeat CT in 3 mos (nov-dec'24). He is a non-smoker  RTC in 2 mos after CT  Lupron due in March'25  CMP fine today, await PSA    Thank you Dr Amira Ko for the opportunity to participate in the care of this interesting patient. Please do contact me if I may be of any further assistance    Caleb Martinez MD  [facility] Hematology/Oncology    Copy to : Dr. Rasheeda Boss MD

## 2024-10-18 NOTE — PROGRESS NOTES
[facility] Radiation Treatment Management Note 1-5    Patient:  Sin Rao  Age:  73 year old  Visit Diagnosis:  No diagnosis found.  Primary Rad/Onc:  Dr. Lyle Fang    Site Delivered Dose (cGy) Prescribed Dose (cGy) Fraction #   Prostate 250 7000 1/28           First treatment date:  10/21/2024  Concurrent chemotherapy:  None        9/10/2024     9:31 AM 9/24/2024    10:43 AM 10/15/2024    10:43 AM   Oncology Vitals   Height 5' 11.5\"  5' 11.5\"   Height 182 cm  182 cm   Weight 231 lb 229 lb 3.2 oz 224 lb   Weight 104.781 kg 103.964 kg 101.606 kg   BSA (m2) 2.25 m2 2.25 m2 2.22 m2   BMI 31.77 kg/m2 31.52 kg/m2 30.81 kg/m2   /68 127/57 120/67   Pulse 64 62 62   Resp  18 18   Temp  97.5 °F (36.4 °C) 97.9 °F (36.6 °C)   SpO2  97 % 96 %   Pain Score  0 0        Toxicities:  Fatigue Grade 1= Fatigue relieved by rest  Constipation Grade 0= None  Diarrhea  Grade 0= None  Dysuria on urination Grade 0= None   Urgency on urination Grade 0= None  Frequency on urinationGrade 0= None- every couple hours  Urine stream strength Moderate-Strong  Urine Incontinence Grade 0= None  Nocturia Grade 1= Present X 2 nightly- drinks water up until bed with medications    Nursing Note:  Pt seen for OTV with Dr. Fang.  ADT given to patient on 9/17/2024.  On Flomax 0.4mg nightly  Pt reports good appetite, eating and drinking well.     Wt Readings from Last 6 Encounters:   10/15/24 101.6 kg (224 lb)   09/24/24 104 kg (229 lb 3.2 oz)   09/10/24 104.8 kg (231 lb)   08/30/24 104.3 kg (230 lb)   04/17/24 108 kg (238 lb)   11/11/22 107.5 kg (237 lb)       Betsy DAVILA RN    Physician Note:  Subjective:    New start  Objective:  NAD      Treatment setup imaging have been reviewed:  Yes    Assessment/Plan:    DIAGNOSIS :  LN+ prostate cancer, on ADT/ARSI, PSA 19.24 also with possible chest LN, for consideration of XRT     Continue radiotherapy per plan    On flomax     Next visit:  1 week    Dr. Lyle Fang    Oncology Chemo Meds           8/7/2024    10:00 Bonus Cycle; Day 1    9/17/2024 13:54   Oncology Flowsheet   leuprolide 45 MG (Lupron Depot 6-Month) IM  45 mg       left upper outer gluteal muscle   sodium chloride 0.9% IV New Bag (unknown dose)       Details          Administered dose cannot be determined              Current Outpatient Medications:     Abiraterone Acetate 250 MG Oral Tab, Take 1,000 mg by mouth daily. Take 4 capsules one hour before or two hours after a meal., Disp: 120 tablet, Rfl: 11    prochlorperazine (COMPAZINE) 10 mg tablet, Take 1 tablet (10 mg total) by mouth every 6 (six) hours as needed for Nausea., Disp: 30 tablet, Rfl: 3    atorvastatin 10 MG Oral Tab, Take 1 tablet (10 mg total) by mouth nightly., Disp: 90 tablet, Rfl: 0    predniSONE 5 MG Oral Tab, Take 1 tablet (5 mg total) by mouth daily., Disp: 30 tablet, Rfl: 11    tamsulosin 0.4 MG Oral Cap, Take 1 capsule (0.4 mg total) by mouth daily. Take 1/2 hour following the same meal each day, Disp: 90 capsule, Rfl: 3    hydroCHLOROthiazide 25 MG Oral Tab, Take 1 tablet (25 mg total) by mouth daily., Disp: 90 tablet, Rfl: 3    olmesartan 20 MG Oral Tab, Take 1 tablet (20 mg total) by mouth daily., Disp: 90 tablet, Rfl: 3    aspirin 81 MG Oral Tab, Take 1 tablet (81 mg total) by mouth daily., Disp: , Rfl:     bicalutamide 50 MG Oral Tab, Take 1 tablet (50 mg total) by mouth daily. (Patient not taking: Reported on 10/21/2024), Disp: 30 tablet, Rfl: 0

## 2024-10-21 ENCOUNTER — APPOINTMENT (OUTPATIENT)
Dept: RADIATION ONCOLOGY | Facility: HOSPITAL | Age: 74
End: 2024-10-21
Attending: RADIOLOGY
Payer: MEDICARE

## 2024-10-21 VITALS
HEART RATE: 66 BPM | TEMPERATURE: 97 F | SYSTOLIC BLOOD PRESSURE: 134 MMHG | OXYGEN SATURATION: 98 % | BODY MASS INDEX: 31 KG/M2 | DIASTOLIC BLOOD PRESSURE: 65 MMHG | RESPIRATION RATE: 16 BRPM | WEIGHT: 226.38 LBS

## 2024-10-21 DIAGNOSIS — C61 PROSTATE CANCER (HCC): Primary | ICD-10-CM

## 2024-10-21 PROCEDURE — 77385 HC IMRT SIMPLE: CPT | Performed by: RADIOLOGY

## 2024-10-22 PROCEDURE — 77385 HC IMRT SIMPLE: CPT | Performed by: RADIOLOGY

## 2024-10-23 ENCOUNTER — DIETICIAN VISIT (OUTPATIENT)
Dept: NUTRITION | Facility: HOSPITAL | Age: 74
End: 2024-10-23

## 2024-10-23 VITALS — BODY MASS INDEX: 30 KG/M2 | WEIGHT: 220 LBS

## 2024-10-23 PROCEDURE — 77385 HC IMRT SIMPLE: CPT | Performed by: RADIOLOGY

## 2024-10-23 NOTE — PROGRESS NOTES
Oncology Nutrition Assessment    Ht Readings from Last 1 Encounters:   10/15/24 181.6 cm (5' 11.5\")       Wt Readings from Last 1 Encounters:   10/23/24 99.8 kg (220 lb)     BMI Calculated:  Body mass index is 30.26 kg/m².  Weight History:    Wt Readings from Last 10 Encounters:   10/23/24 99.8 kg (220 lb)   10/21/24 102.7 kg (226 lb 6.4 oz)   10/15/24 101.6 kg (224 lb)   09/24/24 104 kg (229 lb 3.2 oz)   09/10/24 104.8 kg (231 lb)   08/30/24 104.3 kg (230 lb)   04/17/24 108 kg (238 lb)   11/11/22 107.5 kg (237 lb)   09/07/22 106.6 kg (235 lb)   07/21/22 106.9 kg (235 lb 11.2 oz)     IBW:  175#  UBW:  225-230#    Diagnoses:  prostate cancer   Significant Medical History:   has a past medical history of Age-related nuclear cataract of both eyes (10/22/2019), Aortic atherosclerosis (HCC), Dyslipidemia, Essential hypertension, Glaucoma suspect of both eyes (10/22/2019), adenomatous colonic polyps (12/2020), Pancolonic diverticulosis, Prediabetes, Pulmonary hypertension (HCC), and Vitreous floaters of both eyes (10/22/2019).   Diet History:  no special diet--eating more fresh fruits and vegetables  Oral Liquid Supplement Use:  none    Appetite: good    Nutritional Physical History:   well nourished per visual exam  GI Problems:  GI intact--was very gassy today during tx, had to get off table twice to pass gas  Adequacy of PO Intake:  good    Nutrition Risk Status:  nutrition status intact    Nutritional Goals:  maintain weight within 5% and aid in management of treatment side effects via dietary measures  Handouts Provided:  Basic diet handout provided (limit nuts, seeds, popcorn, corn and avoid gaseous foods). Contact information for questions provided.     Weekly Visits:  Nutritional Interventions:  10/23/2024  Discussed importance of good oral intake for wt maint during tx.  Urged adequate fluids. Balance rest and activity. Advised avoidance of nuts, seeds, popcorn and highly gaseous foods. Discussed gas x before bed  and 1 hr before tx in the am--pt had be taken off the table x2 today due to excessive gas. Advised more restrictions if diarrhea presents. No plans to see further unless requested or pt having significant side effects that I can be of aid.  Contact information provided for questions.       Emilia Thurston RD, LDN   Clinical Dietitian [id number]

## 2024-10-24 PROCEDURE — 77385 HC IMRT SIMPLE: CPT | Performed by: RADIOLOGY

## 2024-10-25 PROCEDURE — 77336 RADIATION PHYSICS CONSULT: CPT | Performed by: RADIOLOGY

## 2024-10-25 PROCEDURE — 77385 HC IMRT SIMPLE: CPT | Performed by: RADIOLOGY

## 2024-10-25 NOTE — PROGRESS NOTES
[facility] Radiation Treatment Management Note 6-10    Patient:  Sin Rao  Age:  73 year old  Visit Diagnosis:    1. Prostate cancer (HCC)      Primary Rad/Onc:  Dr. Lyle Fang    Site Delivered Dose (cGy) Prescribed Dose (cGy) Fraction #   Prostate SIB 1500 7000 6/28           First treatment date:   10/21/2024  Concurrent chemotherapy:  None        10/21/2024    12:18 PM 10/23/2024     8:44 AM 10/28/2024     8:53 AM   Oncology Vitals   Weight 226 lb 6.4 oz 220 lb 223 lb 9.6 oz   Weight 102.694 kg 99.791 kg 101.424 kg   BSA (m2) 2.23 m2 2.21 m2 2.22 m2   BMI 31.14 kg/m2 30.26 kg/m2 30.75 kg/m2   /65  133/66   Pulse 66  73   Resp 16  16   Temp 97.3 °F (36.3 °C)  97.4 °F (36.3 °C)   SpO2 98 %  98 %   Pain Score 0  0        Toxicities:  Fatigue Grade 1= Fatigue relieved by rest- pretty good overall  Constipation Grade 0= None  Diarrhea  Grade 0= None  Dysuria on urination Grade 1= Present - intermittent, aware he can try Tylenol PRN   Urgency on urination Grade 0= None  Frequency on urinationGrade 1= Present- every couple hours  Urine stream strength Moderate-Strong  Urine Incontinence Grade 0= None  Nocturia Grade 1= Present X 2- 4 nightly- drinks water up until bed with medications    Nursing Note:  Pt seen for OTV with Dr. Orr.  Reports good appetite, eating and drinking well.   On Flomax 0.4mg nightly.  Pt reports gas, taking 1 tablet of gasX BID. Met with Emilia last week to discuss dietary changes.     Wt Readings from Last 6 Encounters:   10/28/24 101.4 kg (223 lb 9.6 oz)   10/23/24 99.8 kg (220 lb)   10/21/24 102.7 kg (226 lb 6.4 oz)   10/15/24 101.6 kg (224 lb)   09/24/24 104 kg (229 lb 3.2 oz)   09/10/24 104.8 kg (231 lb)       Betsy DAVILA RN    Physician Note:  Subjective:  -doing well, LUTS mostly stable, on flomax at bedtime  -struggling with gas, does have a BM each morning      Objective:  Vitals noted  ECOG 0  NAD      Treatment setup imaging have been  reviewed:  Yes    Assessment/Plan:  -cont RT per plan, cont flomax  -will try adding Beano    We discussed expected future toxicity. All questions answered.  Next OTV 1 week    Adina Orr MD  For AS

## 2024-10-28 ENCOUNTER — OFFICE VISIT (OUTPATIENT)
Dept: RADIATION ONCOLOGY | Facility: HOSPITAL | Age: 74
End: 2024-10-28
Attending: RADIOLOGY
Payer: MEDICARE

## 2024-10-28 VITALS
RESPIRATION RATE: 16 BRPM | SYSTOLIC BLOOD PRESSURE: 133 MMHG | HEART RATE: 73 BPM | WEIGHT: 223.63 LBS | DIASTOLIC BLOOD PRESSURE: 66 MMHG | OXYGEN SATURATION: 98 % | TEMPERATURE: 97 F | BODY MASS INDEX: 31 KG/M2

## 2024-10-28 DIAGNOSIS — C61 PROSTATE CANCER (HCC): Primary | ICD-10-CM

## 2024-10-28 PROCEDURE — 77385 HC IMRT SIMPLE: CPT | Performed by: RADIOLOGY

## 2024-10-29 PROCEDURE — 77385 HC IMRT SIMPLE: CPT | Performed by: RADIOLOGY

## 2024-10-30 PROCEDURE — 77385 HC IMRT SIMPLE: CPT | Performed by: RADIOLOGY

## 2024-10-31 PROCEDURE — 77385 HC IMRT SIMPLE: CPT | Performed by: RADIOLOGY

## 2024-11-01 ENCOUNTER — APPOINTMENT (OUTPATIENT)
Dept: RADIATION ONCOLOGY | Facility: HOSPITAL | Age: 74
End: 2024-11-01
Attending: RADIOLOGY
Payer: MEDICARE

## 2024-11-01 PROCEDURE — 77336 RADIATION PHYSICS CONSULT: CPT | Performed by: RADIOLOGY

## 2024-11-01 PROCEDURE — 77385 HC IMRT SIMPLE: CPT | Performed by: RADIOLOGY

## 2024-11-04 ENCOUNTER — OFFICE VISIT (OUTPATIENT)
Dept: RADIATION ONCOLOGY | Facility: HOSPITAL | Age: 74
End: 2024-11-04
Attending: RADIOLOGY
Payer: MEDICARE

## 2024-11-04 VITALS
RESPIRATION RATE: 16 BRPM | WEIGHT: 226.63 LBS | DIASTOLIC BLOOD PRESSURE: 66 MMHG | OXYGEN SATURATION: 96 % | SYSTOLIC BLOOD PRESSURE: 134 MMHG | BODY MASS INDEX: 31 KG/M2 | HEART RATE: 75 BPM | TEMPERATURE: 97 F

## 2024-11-04 DIAGNOSIS — C61 PROSTATE CANCER (HCC): Primary | ICD-10-CM

## 2024-11-04 PROCEDURE — 77385 HC IMRT SIMPLE: CPT | Performed by: RADIOLOGY

## 2024-11-05 PROCEDURE — 77385 HC IMRT SIMPLE: CPT | Performed by: RADIOLOGY

## 2024-11-06 PROCEDURE — 77385 HC IMRT SIMPLE: CPT | Performed by: RADIOLOGY

## 2024-11-07 PROCEDURE — 77385 HC IMRT SIMPLE: CPT | Performed by: RADIOLOGY

## 2024-11-08 PROCEDURE — 77336 RADIATION PHYSICS CONSULT: CPT | Performed by: RADIOLOGY

## 2024-11-08 PROCEDURE — 77385 HC IMRT SIMPLE: CPT | Performed by: RADIOLOGY

## 2024-11-08 NOTE — PROGRESS NOTES
[facility] Radiation Treatment Management Note 16-20    Patient:  Sin Rao  Age:  74 year old  Visit Diagnosis:    1. Prostate cancer (HCC)      Primary Rad/Onc:  Dr. Lyle Fang    Site Delivered Dose (cGy) Prescribed Dose (cGy) Fraction #   Prostate 4000 7000 16/28           First treatment date:  10/21/24  Concurrent chemotherapy:  none        10/28/2024     8:53 AM 11/4/2024     8:16 AM 11/11/2024     8:35 AM   Oncology Vitals   Weight 223 lb 9.6 oz 226 lb 9.6 oz 227 lb 9.6 oz   Weight 101.424 kg 102.785 kg 103.239 kg   BSA (m2) 2.22 m2 2.24 m2 2.24 m2   BMI 30.75 kg/m2 31.16 kg/m2 31.3 kg/m2   /66 134/66 150/72   Pulse 73 75 71   Resp 16 16 18   Temp 97.4 °F (36.3 °C) 97.3 °F (36.3 °C) 97.3 °F (36.3 °C)   SpO2 98 % 96 % 96 %   Pain Score 0 0 0        Toxicities:  Fatigue Grade 1= Fatigue relieved by rest- remaining active  Constipation Grade 0= None  Diarrhea  Grade 1= Increase of <4 stools per day over baseline; mild increase in ostomy output compared to baseline - took imodium. Just a few times   Dysuria on urination Grade 1= Present recommended tylenol. Severity varies.   Urgency on urination Grade 1= Present  Frequency on urinationGrade 1= Present  Urine stream strength Moderate  Urine Incontinence Grade 0= None  Nocturia Grade 1= Present X 4 nightly. Drinks water up until bed with medications.    Nursing Note:  Patient seen for OTV with Dr. Fang.   On flomax 0.4mg nightly.  Having dysuria, has not tried tylenol yet.   Diarrhea controlled with imodium.   Good appetite.     Wt Readings from Last 6 Encounters:   11/11/24 103.2 kg (227 lb 9.6 oz)   11/04/24 102.8 kg (226 lb 9.6 oz)   10/28/24 101.4 kg (223 lb 9.6 oz)   10/23/24 99.8 kg (220 lb)   10/21/24 102.7 kg (226 lb 6.4 oz)   10/15/24 101.6 kg (224 lb)     Yvonne CACERES RN    Physician Note:  Subjective:    Agree with RN note as above  Mild LUTS      Objective:  NAD      Treatment setup imaging have been  reviewed:  Yes    Assessment/Plan:    DIAGNOSIS :  LN+ prostate cancer, on ADT/ARSI, PSA 19.24 also with possible chest LN, for consideration of XRT     Continue radiotherapy per plan    On flomax , nocturia x 3-4, to decrease at bedtime water  On tylenol PRN, to use OTC ATC    Next visit:  1 week    Dr. Lyle Fang

## 2024-11-11 ENCOUNTER — OFFICE VISIT (OUTPATIENT)
Dept: RADIATION ONCOLOGY | Facility: HOSPITAL | Age: 74
End: 2024-11-11
Attending: RADIOLOGY
Payer: MEDICARE

## 2024-11-11 VITALS
WEIGHT: 227.63 LBS | RESPIRATION RATE: 18 BRPM | OXYGEN SATURATION: 96 % | TEMPERATURE: 97 F | BODY MASS INDEX: 31 KG/M2 | DIASTOLIC BLOOD PRESSURE: 72 MMHG | SYSTOLIC BLOOD PRESSURE: 150 MMHG | HEART RATE: 71 BPM

## 2024-11-11 DIAGNOSIS — C61 PROSTATE CANCER (HCC): Primary | ICD-10-CM

## 2024-11-11 PROCEDURE — 77385 HC IMRT SIMPLE: CPT | Performed by: RADIOLOGY

## 2024-11-12 PROCEDURE — 77385 HC IMRT SIMPLE: CPT | Performed by: RADIOLOGY

## 2024-11-13 PROCEDURE — 77385 HC IMRT SIMPLE: CPT | Performed by: RADIOLOGY

## 2024-11-14 PROCEDURE — 77385 HC IMRT SIMPLE: CPT | Performed by: RADIOLOGY

## 2024-11-14 PROCEDURE — 77336 RADIATION PHYSICS CONSULT: CPT | Performed by: RADIOLOGY

## 2024-11-15 PROCEDURE — 77385 HC IMRT SIMPLE: CPT | Performed by: RADIOLOGY

## 2024-11-15 NOTE — PROGRESS NOTES
[facility] Radiation Treatment Management Note 21-25    Patient:  Sin Rao  Age:  74 year old  Visit Diagnosis:    1. Prostate cancer (HCC)      Primary Rad/Onc:  Dr. Lyle Fang    Site Delivered Dose (cGy) Prescribed Dose (cGy) Fraction #   Prostate 5250 7000 21/28           First treatment date:  10/21/24  Concurrent chemotherapy:  N/A        11/4/2024     8:16 AM 11/11/2024     8:35 AM 11/18/2024     8:25 AM   Oncology Vitals   Weight 226 lb 9.6 oz 227 lb 9.6 oz 223 lb 12.8 oz   Weight 102.785 kg 103.239 kg 101.515 kg   BSA (m2) 2.24 m2 2.24 m2 2.22 m2   BMI 31.16 kg/m2 31.3 kg/m2 30.78 kg/m2   /66 150/72 145/66   Pulse 75 71 58   Resp 16 18 16   Temp 97.3 °F (36.3 °C) 97.3 °F (36.3 °C) 97.6 °F (36.4 °C)   SpO2 96 % 96 % 97 %   Pain Score 0 0 0        Toxicities:  Fatigue Grade 1= Fatigue relieved by rest  Constipation Grade 0= None  Diarrhea  Grade 1= Increase of <4 stools per day over baseline; mild increase in ostomy output compared to baseline - imodium PRN helps  Dysuria on urination Grade 1= Present  Urgency on urination Grade 1= Present  Frequency on urinationGrade 1= Present- worse than last week  Urine stream strength Moderate  Urine Incontinence Grade 1= Occasional (e.g., with coughing, sneezing, etc.), pads not indicated- only one episode.   Nocturia Grade 1= Present X 5-6 nightly- stops drinking at 6pm. Trying to take pills earlier.     Nursing Note:  Patient seen for OTV with Dr. Fang.   Eating and drinking well.   Notices increased frequency and urgency. Had one episode of urge incontinence.   Waking up 5-6 times overnight. Limiting fluids after 6pm.  Burning with urination despite taking advil BID.   On Flomax 0.4 mg nightly    Wt Readings from Last 6 Encounters:   11/18/24 101.5 kg (223 lb 12.8 oz)   11/11/24 103.2 kg (227 lb 9.6 oz)   11/04/24 102.8 kg (226 lb 9.6 oz)   10/28/24 101.4 kg (223 lb 9.6 oz)   10/23/24 99.8 kg (220 lb)   10/21/24 102.7 kg (226 lb 6.4 oz)        Yvonne CACERES RN    Physician Note:  Subjective:    Agree with RN note as above  Increasing LUTS      Objective:  NAD      Treatment setup imaging have been reviewed:  Yes    Assessment/Plan:    DIAGNOSIS :  LN+ prostate cancer, on ADT/ARSI, PSA 19.24 also with possible chest LN, for consideration of XRT     Continue radiotherapy per plan    On flomax, titrate to BID  Add tylenol to advil, azo if needed  nocturia x 3-4, to decrease at bedtime water      Next visit:  1 week    Dr. Lyle Fang

## 2024-11-18 ENCOUNTER — OFFICE VISIT (OUTPATIENT)
Dept: RADIATION ONCOLOGY | Facility: HOSPITAL | Age: 74
End: 2024-11-18
Attending: RADIOLOGY
Payer: MEDICARE

## 2024-11-18 VITALS
OXYGEN SATURATION: 97 % | HEART RATE: 58 BPM | TEMPERATURE: 98 F | DIASTOLIC BLOOD PRESSURE: 66 MMHG | WEIGHT: 223.81 LBS | BODY MASS INDEX: 31 KG/M2 | SYSTOLIC BLOOD PRESSURE: 145 MMHG | RESPIRATION RATE: 16 BRPM

## 2024-11-18 DIAGNOSIS — C61 PROSTATE CANCER (HCC): Primary | ICD-10-CM

## 2024-11-18 PROCEDURE — 77385 HC IMRT SIMPLE: CPT | Performed by: RADIOLOGY

## 2024-11-19 PROCEDURE — 77385 HC IMRT SIMPLE: CPT | Performed by: RADIOLOGY

## 2024-11-20 PROCEDURE — 77385 HC IMRT SIMPLE: CPT | Performed by: RADIOLOGY

## 2024-11-21 PROCEDURE — 77385 HC IMRT SIMPLE: CPT | Performed by: RADIOLOGY

## 2024-11-22 PROCEDURE — 77385 HC IMRT SIMPLE: CPT | Performed by: RADIOLOGY

## 2024-11-22 PROCEDURE — 77336 RADIATION PHYSICS CONSULT: CPT | Performed by: RADIOLOGY

## 2024-11-24 NOTE — PROGRESS NOTES
[facility] Radiation Treatment Management Note 26-30    Patient:  Sin Rao  Age:  74 year old  Visit Diagnosis:    1. Prostate cancer (HCC)      Primary Rad/Onc:  Dr. Lyle Fang    Site Delivered Dose (cGy) Prescribed Dose (cGy) Fraction #   Prostate SIB 6500 7000 26/28           First treatment date:  10/21/24  Concurrent chemotherapy:  N/A        11/11/2024     8:35 AM 11/18/2024     8:25 AM 11/25/2024     8:00 AM   Oncology Vitals   Weight 227 lb 9.6 oz 223 lb 12.8 oz 222 lb 9.6 oz   Weight 103.239 kg 101.515 kg 100.971 kg   BSA (m2) 2.24 m2 2.22 m2 2.22 m2   BMI 31.3 kg/m2 30.78 kg/m2 30.61 kg/m2   /72 145/66 160/74   Pulse 71 58 67   Resp 18 16 16   Temp 97.3 °F (36.3 °C) 97.6 °F (36.4 °C) 97.4 °F (36.3 °C)   SpO2 96 % 97 % 97 %   Pain Score 0 0 0        Toxicities:  Fatigue Grade 1= Fatigue relieved by rest- remaining active   Constipation Grade 0= None  Diarrhea  Grade 0= None  Dysuria on urination Grade 1= Present - taking tylenol and ibuprofen BID  Urgency on urination Grade 1= Present- not as bad   Frequency on urinationGrade 1= Present  Urine stream strength Moderate  Urine Incontinence Grade 0= None hasn't happened since last week.    Nocturia Grade 1= Present X 5-6 nightly. Stops taking pills with water at 6pm    Nursing Note:  Patient seen for OTV with Dr. Fang.  Flomax 0.4mg nightly. Sometimes takes it BID.   Taking advil and tylenol BID for dysuria. Helps mildly    AVS given to patient      Wt Readings from Last 6 Encounters:   11/25/24 101 kg (222 lb 9.6 oz)   11/18/24 101.5 kg (223 lb 12.8 oz)   11/11/24 103.2 kg (227 lb 9.6 oz)   11/04/24 102.8 kg (226 lb 9.6 oz)   10/28/24 101.4 kg (223 lb 9.6 oz)   10/23/24 99.8 kg (220 lb)     Yvonne D, RN      Physician Note:  Subjective:    Agree with RN note as above  Increasing LUTS      Objective:  NAD      Treatment setup imaging have been reviewed:  Yes    Assessment/Plan:    DIAGNOSIS :  LN+ prostate cancer, on ADT/ARSI,  PSA 19.24 also with possible chest LN, for consideration of XRT     Continue radiotherapy per plan    On flomax, on BID, to taper to every day 2-3 post tx  Add tylenol to advil, azo if needed  nocturia x 3-4, to decrease at bedtime water         Dr. Lyle Fang

## 2024-11-25 ENCOUNTER — OFFICE VISIT (OUTPATIENT)
Dept: RADIATION ONCOLOGY | Facility: HOSPITAL | Age: 74
End: 2024-11-25
Attending: RADIOLOGY
Payer: MEDICARE

## 2024-11-25 VITALS
WEIGHT: 222.63 LBS | TEMPERATURE: 97 F | BODY MASS INDEX: 31 KG/M2 | OXYGEN SATURATION: 97 % | DIASTOLIC BLOOD PRESSURE: 74 MMHG | SYSTOLIC BLOOD PRESSURE: 160 MMHG | HEART RATE: 67 BPM | RESPIRATION RATE: 16 BRPM

## 2024-11-25 DIAGNOSIS — C61 PROSTATE CANCER (HCC): Primary | ICD-10-CM

## 2024-11-25 PROCEDURE — 77385 HC IMRT SIMPLE: CPT | Performed by: RADIOLOGY

## 2024-11-25 NOTE — PATIENT INSTRUCTIONS
Follow up with Dr. Fang in 3 months.  Beverly will call you to schedule your follow up appointment.   Please call [phone] with any radiation questions.   1 week prior to PSA blood test, please refrain from bike riding, sexual activity and no prostate exams.

## 2024-11-26 PROCEDURE — 77385 HC IMRT SIMPLE: CPT | Performed by: RADIOLOGY

## 2024-11-27 ENCOUNTER — TELEPHONE (OUTPATIENT)
Dept: RADIATION ONCOLOGY | Facility: HOSPITAL | Age: 74
End: 2024-11-27

## 2024-11-27 PROCEDURE — 77336 RADIATION PHYSICS CONSULT: CPT | Performed by: RADIOLOGY

## 2024-11-27 PROCEDURE — 77385 HC IMRT SIMPLE: CPT | Performed by: RADIOLOGY

## 2024-12-02 ENCOUNTER — TELEPHONE (OUTPATIENT)
Dept: INTERNAL MEDICINE CLINIC | Facility: CLINIC | Age: 74
End: 2024-12-02

## 2024-12-02 NOTE — TELEPHONE ENCOUNTER
Unable to reach patient for medication adherence consult. LVM for patient to call me back at [phone].

## 2024-12-17 ENCOUNTER — HOSPITAL ENCOUNTER (OUTPATIENT)
Dept: CT IMAGING | Facility: HOSPITAL | Age: 74
Discharge: HOME | End: 2024-12-17
Attending: INTERNAL MEDICINE
Payer: MEDICARE

## 2024-12-17 DIAGNOSIS — C61 PROSTATE CANCER (HCC): ICD-10-CM

## 2024-12-17 DIAGNOSIS — R91.1 LUNG NODULE: ICD-10-CM

## 2024-12-17 PROCEDURE — 71250 CT THORAX DX C-: CPT | Performed by: INTERNAL MEDICINE

## 2024-12-18 ENCOUNTER — NURSE ONLY (OUTPATIENT)
Age: 74
End: 2024-12-18
Attending: INTERNAL MEDICINE
Payer: MEDICARE

## 2024-12-18 ENCOUNTER — OFFICE VISIT (OUTPATIENT)
Age: 74
End: 2024-12-18
Attending: INTERNAL MEDICINE
Payer: MEDICARE

## 2024-12-18 VITALS
TEMPERATURE: 98 F | HEIGHT: 71.5 IN | HEART RATE: 61 BPM | BODY MASS INDEX: 29.99 KG/M2 | RESPIRATION RATE: 16 BRPM | OXYGEN SATURATION: 97 % | SYSTOLIC BLOOD PRESSURE: 132 MMHG | DIASTOLIC BLOOD PRESSURE: 66 MMHG | WEIGHT: 219 LBS

## 2024-12-18 DIAGNOSIS — R30.0 DYSURIA: ICD-10-CM

## 2024-12-18 DIAGNOSIS — C61 PROSTATE CANCER (HCC): ICD-10-CM

## 2024-12-18 DIAGNOSIS — Z51.81 THERAPEUTIC DRUG MONITORING: ICD-10-CM

## 2024-12-18 DIAGNOSIS — R91.1 LUNG NODULE: ICD-10-CM

## 2024-12-18 DIAGNOSIS — R23.2 VASOMOTOR FLUSHING: ICD-10-CM

## 2024-12-18 DIAGNOSIS — C77.8: ICD-10-CM

## 2024-12-18 DIAGNOSIS — C61 PROSTATE CANCER (HCC): Primary | ICD-10-CM

## 2024-12-18 LAB
ALBUMIN SERPL-MCNC: 4.2 G/DL (ref 3.2–4.8)
ALBUMIN/GLOB SERPL: 1.8 {RATIO} (ref 1–2)
ALP LIVER SERPL-CCNC: 57 U/L
ALT SERPL-CCNC: 16 U/L
ANION GAP SERPL CALC-SCNC: 4 MMOL/L (ref 0–18)
AST SERPL-CCNC: 21 U/L (ref ?–34)
BASOPHILS # BLD AUTO: 0.02 X10(3) UL (ref 0–0.2)
BASOPHILS NFR BLD AUTO: 0.5 %
BILIRUB SERPL-MCNC: 0.6 MG/DL (ref 0.2–1.1)
BUN BLD-MCNC: 18 MG/DL (ref 9–23)
BUN/CREAT SERPL: 18.2 (ref 10–20)
CALCIUM BLD-MCNC: 9.8 MG/DL (ref 8.7–10.4)
CHLORIDE SERPL-SCNC: 108 MMOL/L (ref 98–112)
CO2 SERPL-SCNC: 31 MMOL/L (ref 21–32)
CREAT BLD-MCNC: 0.99 MG/DL
DEPRECATED RDW RBC AUTO: 50.3 FL (ref 35.1–46.3)
EGFRCR SERPLBLD CKD-EPI 2021: 79 ML/MIN/1.73M2 (ref 60–?)
EOSINOPHIL # BLD AUTO: 0.07 X10(3) UL (ref 0–0.7)
EOSINOPHIL NFR BLD AUTO: 1.6 %
ERYTHROCYTE [DISTWIDTH] IN BLOOD BY AUTOMATED COUNT: 16 % (ref 11–15)
FASTING STATUS PATIENT QL REPORTED: NO
GLOBULIN PLAS-MCNC: 2.3 G/DL (ref 2–3.5)
GLUCOSE BLD-MCNC: 100 MG/DL (ref 70–99)
HCT VFR BLD AUTO: 37.4 %
HGB BLD-MCNC: 12.2 G/DL
IMM GRANULOCYTES # BLD AUTO: 0.04 X10(3) UL (ref 0–1)
IMM GRANULOCYTES NFR BLD: 0.9 %
LYMPHOCYTES # BLD AUTO: 0.98 X10(3) UL (ref 1–4)
LYMPHOCYTES NFR BLD AUTO: 23.1 %
MCH RBC QN AUTO: 28 PG (ref 26–34)
MCHC RBC AUTO-ENTMCNC: 32.6 G/DL (ref 31–37)
MCV RBC AUTO: 85.8 FL
MONOCYTES # BLD AUTO: 0.86 X10(3) UL (ref 0.1–1)
MONOCYTES NFR BLD AUTO: 20.2 %
NEUTROPHILS # BLD AUTO: 2.28 X10 (3) UL (ref 1.5–7.7)
NEUTROPHILS # BLD AUTO: 2.28 X10(3) UL (ref 1.5–7.7)
NEUTROPHILS NFR BLD AUTO: 53.7 %
OSMOLALITY SERPL CALC.SUM OF ELEC: 298 MOSM/KG (ref 275–295)
PLATELET # BLD AUTO: 257 10(3)UL (ref 150–450)
POTASSIUM SERPL-SCNC: 4 MMOL/L (ref 3.5–5.1)
PROT SERPL-MCNC: 6.5 G/DL (ref 5.7–8.2)
PSA SERPL-MCNC: 0.6 NG/ML (ref ?–4)
RBC # BLD AUTO: 4.36 X10(6)UL
SODIUM SERPL-SCNC: 143 MMOL/L (ref 136–145)
WBC # BLD AUTO: 4.3 X10(3) UL (ref 4–11)

## 2024-12-18 NOTE — PROGRESS NOTES
[facility] Hematology/Oncology Progress Note    Patient Name: Sin Rao   YOB: 1950   Medical Record Number: [id number]   CSN: [id number]   Consulting Physician: Caleb Martinez MD  Referring Physician(s): Dr. Amira Ko MD    Reason for  Visit:  Prostate Cancer   Cancer Staging   Prostate cancer (HCC)  Staging form: Prostate, AJCC 8th Edition  - Clinical: Stage IVB (cT2, cN1, cM1a, PSA: 8.2) - Signed by Caleb Martinez MD on 8/30/2024    Diagnosis  1. Prostate cancer (HCC)    2. 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 Lung nodule    4. Therapeutic drug monitoring    5. Vasomotor flushing      Current Therapy  ADT q6mos  Pat/Prednisone - started 9/2024    Interval History  Patient returns for follow-up.  Since his last visit here he continues ADT with Lupron and abiraterone and prednisone.  Completed EBRT 2 wks ago, tolerated this well, had some dysuria - this is improving. He continues to work part-time as a  for [employer].  He reports occasional hot flashes but no new bone pain.   Patient denies any nausea, vomiting, fevers, chills or pain. Pt also denies any bleeding, specifically hematuria, hematemesis or hemoptysis.  He had a CT done recently and here to review those results    History of Present Illness:   Sin Rao is a 74 year old male that was [facility] in [facility] for recently diagnosed prostate cancer. His oncologic history is detailed below    7/17/24 PSA 6/3/24 was elevated to 8.17, which prompted an MRI that showed a PI-RADS 3 lesion within the right anterior transitional zone as well as a highly suspicious left obturator lymph node that was enlarged.  Seminal vesicles were unremarkable.    8/7/2024 patient underwent a CT-guided core biopsy of the left pelvic lymph node which showed multiple fragments of lymph node with metastatic poorly differentiated adenocarcinoma compatible with prostate primary.  This was positive  for CK8/18 CK20 and PSA negative for CK7 and TTF-1.    8/28/2024 the patient underwent a PSMA PET scan that showed marked heterogeneous activity demonstrated throughout the prostate gland and suspected local invasion.  Multifocal hypermetabolic intrapelvic and retroperitoneal lymph nodes compatible extensive metastatic disease.(Including multiple anterior aortocaval and pericaval lymph nodes as well as retrocaval lymphadenopathy.]  There is also appear to be hypermetabolic left supraclavicular lymph node as well as a mildly hypermetabolic right middle lobe pulmonary nodule that was concerning for metastatic disease.    9/2024 pretreatment PSA had increased to 19.2.  He was started on androgen deprivation therapy with leuprolide and abiraterone and prednisone.    10/21/24 - 12/2024 Completed EBRT 7000cGy over 30 fractions    Past Medical History:  Past Medical History:    Age-related nuclear cataract of both eyes    Aortic atherosclerosis (HCC)    CXR 12-19    Dyslipidemia    Essential hypertension    Glaucoma suspect of both eyes    Hx of adenomatous colonic polyps    polyps removed, patient can repeat in 5 years IF remains in good health/chooses to continue screening    Pancolonic diverticulosis    Colonoscopy 12-20    Prediabetes    Pulmonary hypertension (HCC)    Echo 8-21 with mild LVH, EF 65-70%, mild AI, PAP 34 mmHg    Vitreous floaters of both eyes       Past Surgical History:  Past Surgical History:   Procedure Laterality Date    Hernia surgery  2008, 2010       Family Medical History:  Family History   Problem Relation Age of Onset    Hypertension Mother     Hypertension Sister     Hypertension Brother     Hypertension Brother     Heart Disorder Brother     Diabetes Neg     Glaucoma Neg     Macular degeneration Neg        Gyne History:      Social History:  Social History     Socioeconomic History    Marital status: Single     Spouse name: Not on file    Number of children: Not on file    Years of  education: Not on file    Highest education level: Not on file   Occupational History    Not on file   Tobacco Use    Smoking status: Never     Passive exposure: Never    Smokeless tobacco: Never   Vaping Use    Vaping status: Never Used   Substance and Sexual Activity    Alcohol use: Yes     Comment: 1x/month    Drug use: No    Sexual activity: Not on file   Other Topics Concern    Not on file   Social History Narrative    Not on file     Social Drivers of Health     Financial Resource Strain: Not on file   Food Insecurity: Not on file   Transportation Needs: Not on file   Physical Activity: Not on file   Stress: Not on file   Social Connections: Not on file   Housing Stability: Not on file       Allergies:   Allergies   Allergen Reactions    Bees OTHER (SEE COMMENTS)     Swelling       Current Medications:   predniSONE 5 MG Oral Tab Take 1 tablet (5 mg total) by mouth daily. 30 tablet 11    tamsulosin 0.4 MG Oral Cap Take 1 capsule (0.4 mg total) by mouth daily. Take 1/2 hour following the same meal each day 90 capsule 3    hydroCHLOROthiazide 25 MG Oral Tab Take 1 tablet (25 mg total) by mouth daily. 90 tablet 3    olmesartan 20 MG Oral Tab Take 1 tablet (20 mg total) by mouth daily. 90 tablet 3    aspirin 81 MG Oral Tab Take 1 tablet (81 mg total) by mouth daily.         Review of Systems:  A comprehensive 14 point review of systems was completed.  Pertinent positives and negatives noted in the the HPI.     Vital Signs:  /66 (BP Location: Left arm, Patient Position: Sitting, Cuff Size: large)   Pulse 61   Temp 97.7 °F (36.5 °C) (Oral)   Resp 16   Ht 1.816 m (5' 11.5\")   Wt 99.3 kg (219 lb)   SpO2 97%   BMI 30.12 kg/m²    Physical Examination:    General: Patient is alert and oriented x 3, not in acute distress.  Psych:  awake and alert, no distress  HEENT: EOMs intact. PERRL. Oropharynx is clear.   Neck: No JVD. No palpable lymphadenopathy. Neck is supple.  Lymphatics: There is no palpable  lymphadenopathy throughout in the cervical, supraclavicular, axillary, or inguinal regions.  Chest: Clear to auscultation. No wheezes or rales.  Heart: Regular rate and rhythm. S1S2 normal.  Extremities: No edema or calf tenderness.  Neurological: Grossly intact.     Performance Status:  ECOG - 0    Labs:    Lab Results   Component Value Date/Time    WBC 4.3 2024 08:47 AM    RBC 4.36 2024 08:47 AM    HGB 12.2 (L) 2024 08:47 AM    HCT 37.4 (L) 2024 08:47 AM    MCV 85.8 2024 08:47 AM    MCH 28.0 2024 08:47 AM    MCHC 32.6 2024 08:47 AM    RDW 16.0 (H) 2024 08:47 AM    NEPRELIM 2.28 2024 08:47 AM    .0 2024 08:47 AM       Lab Results   Component Value Date/Time     (H) 2024 08:47 AM    BUN 18 2024 08:47 AM    CREATSERUM 0.99 2024 08:47 AM    GFRNAA 57 (L) 2022 08:32 AM    CA 9.8 2024 08:47 AM    ALB 4.2 2024 08:47 AM     2024 08:47 AM    K 4.0 2024 08:47 AM     2024 08:47 AM    CO2 31.0 2024 08:47 AM    ALKPHO 57 2024 08:47 AM    AST 21 2024 08:47 AM    ALT 16 2024 08:47 AM       Imagin/18 Reviewed CT films personally today    Pathology:  See above    Impression:      ICD-10-CM    1. Prostate cancer (HCC)  C61       2. 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 Lung nodule  R91.1       4. Therapeutic drug monitoring  Z51.81       5. Vasomotor flushing  R23.2       I had a long discussion with the patient and explained that unfortunately he has prostate cancer that is metastatic to multiple regional and nonregional lymph nodes.    We discussed treatment options, with systemic androgen deprivation therapy along with an oral androgen receptor signaling inhibitor.  He appears to have low burden of metastatic disease with primary nonregional vik involvement  involvement, so I have recommended consideration of radiation  therapy to the prostate    He was standing on ADT and abiraterone and prednisone is tolerating this well and has completed XRT to prostate/pelvic LN per Dr. Fang of Radiation Oncology    Plan:  S/p leuprolide. Continue abiraterone/prednisone, redose Lupron in March  RML Lung nodule: unclear etiology, Stable on fu CT will repeat in 6 mos. He is a non-smoker  RTC in 3 mos  PSA continues to decline as expected   Hot flashes: not too bothersome, monitor  Dysuria: RT related, improving. [facility] this is [facility]is[facility]    Thank you Dr Amira Ko for the opportunity to participate in the care of this interesting patient. Please do contact me if I may be of any further assistance    Caleb Martinez MD  [facility] Hematology/Oncology    Copy to : Dr. Rasheeda Boss MD

## 2025-01-22 ENCOUNTER — OFFICE VISIT (OUTPATIENT)
Dept: SURGERY | Facility: CLINIC | Age: 75
End: 2025-01-22
Payer: MEDICARE

## 2025-01-22 DIAGNOSIS — C61 PROSTATE CANCER (HCC): Primary | ICD-10-CM

## 2025-01-22 PROCEDURE — 1159F MED LIST DOCD IN RCRD: CPT | Performed by: UROLOGY

## 2025-01-22 PROCEDURE — 99213 OFFICE O/P EST LOW 20 MIN: CPT | Performed by: UROLOGY

## 2025-01-22 PROCEDURE — 1160F RVW MEDS BY RX/DR IN RCRD: CPT | Performed by: UROLOGY

## 2025-01-22 NOTE — PROGRESS NOTES
Amira Ko MD  Department of Urology  [address]  [place] [address]    T: [phone]  F: [phone]    To: Darling Sutton MD   [address] [place] [address]    Re: Sin Rao   MRN: [id number]  : 11/10/1950    Dear Darling Sutton MD,    Today I had the pleasure of seeing Sin Rao in my clinic. As you know, Mr. Rao is a pleasant 74 year old year old male who I am seeing for followup. Patient was last seen in this department on 2024.    Briefly, please note that patient has a history of prostate cancer that was diagnosed after biopsy of left obturator lymph node that was seen on MRI completed for elevated PSA.  Biopsy showed poorly differentiated adenocarcinoma compatible with prostate primary.  His PSMA PET scan showed heterogeneous activity throughout the prostate gland with suspected local invasion.  He did undergo androgen deprivation therapy and radiation therapy.  He continues to follow with medical oncology and radiation oncology.    His most recent PSA is 0.60.    He denies any urologic issues at this time.       PAST MEDICAL HISTORY:  Past Medical History:    Age-related nuclear cataract of both eyes    Aortic atherosclerosis (HCC)    CXR 12-19    Dyslipidemia    Essential hypertension    Glaucoma suspect of both eyes    Hx of adenomatous colonic polyps    polyps removed, patient can repeat in 5 years IF remains in good health/chooses to continue screening    Pancolonic diverticulosis    Colonoscopy 12-20    Prediabetes    Pulmonary hypertension (HCC)    Echo 8-21 with mild LVH, EF 65-70%, mild AI, PAP 34 mmHg    Vitreous floaters of both eyes        PAST SURGICAL HISTORY:  Past Surgical History:   Procedure Laterality Date    Hernia surgery  ,          ALLERGIES:  Allergies[1]      MEDICATIONS:  Current Outpatient Medications   Medication Instructions    aspirin 81 mg, Daily    atorvastatin (LIPITOR) 10 mg, Oral, Nightly     hydroCHLOROthiazide 25 mg, Oral, Daily    olmesartan (BENICAR) 20 mg, Oral, Daily    predniSONE (DELTASONE) 5 mg, Oral, Daily    prochlorperazine (COMPAZINE) 10 mg, Oral, Every 6 hours PRN    tamsulosin (FLOMAX) 0.4 mg, Oral, Daily, Take 1/2 hour following the same meal each day        FAMILY HISTORY:  Family History   Problem Relation Age of Onset    Hypertension Mother     Hypertension Sister     Hypertension Brother     Hypertension Brother     Heart Disorder Brother     Diabetes Neg     Glaucoma Neg     Macular degeneration Neg         SOCIAL HISTORY:  Social History     Socioeconomic History    Marital status: Single   Tobacco Use    Smoking status: Never     Passive exposure: Never    Smokeless tobacco: Never   Vaping Use    Vaping status: Never Used   Substance and Sexual Activity    Alcohol use: Yes     Comment: 1x/month    Drug use: No          PHYSICAL EXAMINATION:  There were no vitals filed for this visit.  CONSTITUTIONAL: No apparent distress, cooperative and communicative  NEUROLOGIC: Alert and oriented   HEAD: Normocephalic, atraumatic   EYES: Sclera non-icteric   ENT: Hearing intact, moist mucous membranes   NECK: No obvious goiter or masses   RESPIRATORY: Normal respiratory effort, Nonlabored breathing on room air  SKIN: No evident rashes   ABDOMEN: Soft, nontender, nondistended, no rebound tenderness, no guarding, no masses      REVIEW OF SYSTEMS:    A comprehensive 10-point review of systems was completed.  Pertinent positives and negatives are noted in the the HPI.       LABORATORY DATA:  Component  Ref Range & Units 12/18/24  8:47 AM   PSA DIAGNOSTIC  <=4.00 ng/mL 0.6           IMAGING REVIEW:  PROCEDURE: CT CHEST (CPT=71250)     COMPARISON: [facility], PET PSMA FOR PROSTATE CARCINOMA (CPT=78815), 8/28/2024, 10:58 AM.     INDICATIONS: Lung nodule. Prostate cancer.     TECHNIQUE: CT images of the chest were obtained without the administration of contrast material.   Automated exposure control for dose reduction was used. Adjustment of the mA and/or kV was done based on the patient's size. Use of iterative reconstruction   technique for dose reduction was used. Dose information is transmitted to the ACR (American College of Radiology) NRDR (National Radiology Data Registry) which includes the Dose Index Registry.     FINDINGS:  CARDIAC: The heart is within normal limits of size.  Moderate coronary artery calcifications.  No pericardial effusion.  MEDIASTINUM/MELLISSA: There are multiple calcified subcentimeter mediastinal and right hilar lymph nodes.  There are scattered nonenlarged mediastinal/hilar lymph nodes.  No new or enlarging mediastinal lymphadenopathy.  LUNGS: There is a 1.1 x 1.1 cm right middle lobe pulmonary nodule (4:  67), which previously measured 1.2 x 1.4 cm on the prior PET.  No new or enlarging pulmonary nodule.  Minimal atelectasis involving the bilateral lower lobes.  There is a linear area  of atelectasis/scarring involving the inferior aspect of the right upper lobe (4:53) There is left lower lobe predominant bronchiectasis.  No pneumonia.  There are scattered subcentimeter calcified granulomas.  VASCULATURE: The main pulmonary artery measures 3.3 cm in diameter.  THORACIC AORTA: The ascending aorta measures 4.0 cm in diameter.  There is a 3 vessel aortic arch.  Mild atherosclerotic calcification of the aortic arch and descending thoracic aorta.  PLEURA:   No mass or effusion.  No pneumothorax.  CHEST WALL: No axillary mass or enlarged adenopathy.  Bilateral gynecomastia.  The previously described FDG-avid left supraclavicular lymph node is not well appreciated on this study.  LIMITED ABDOMEN: Large hiatal hernia.  There is colonic diverticulosis.  There is a 9 x 6 mm right retroperitoneal lymph node (3:93), which previously measured 10 x 7 mm the prior PET dated 08/28/2024.  BONES:   No acute fracture.  No aggressive osseous lesion.  Multilevel degenerative  changes of the thoracic spine.  OTHER: Negative.                 Impression   CONCLUSION:     1. A 1.1 cm right middle lobe pulmonary nodule, which previously measured 1.4 cm on the prior PET dated 08/28/2024.  The differences in technique between the current exam and the prior PET make direct comparison less accurate.  2. No new or enlarging pulmonary nodule.  3. A 0.9 cm right retroperitoneal lymph node, which previously measured 1.0 cm and demonstrated FDG-avidity on the prior PET-CT.  4. Redemonstrated mild dilatation of the main pulmonary artery, which can reflect pulmonary artery hypertension.  5. Stable ectatic ascending aorta measuring 4.0 cm in diameter.  6. Coronary artery calcifications.  7. Large hiatal hernia.  8. Lesser incidental findings described above.     Narrative   PROCEDURE: PET FOR PROSTATE CARCINOMA (CPT=78815)     COMPARISON: [facility], CT BIOPSY LYMPH NODE (CPT=77012/73119), 8/07/2024, 10:04 AM.     Additional comparison is made to outside MRI of the prostate with and without contrast conducted at [facility] on 07/17/2024.     INDICATIONS: Initial Treatment Strategy: 73-year-old male with history of metastatic poorly differentiated adenocarcinoma of prostatic primary. Elevated PSA (R97.20). Most recent PSA of 8.17 ng/mL (04/17/2024), compared to 2.07 ng/mL (07/21/2022).     TECHNIQUE: PET/CT study of the head and torso, done with 5.2 millicuries Ga-68 gozetotide (Illuccix PSMA-11) injected into a right antecubital vein.  A low dose non-contrast CT scanning was performed using a dedicated integrated PET/CT scanner for  attenuation correction and anatomic correlation only.     UPTAKE TIME: 66 minutes.       FINDINGS:  HEAD/NECK: There is physiologic activity in the salivary glands and oral mucosa. Metallic streak artifact from dental amalgam is appreciated. The thyroid gland has a heterogeneous appearance of probable underlying nodules. No pathologic activity.    LUNGS:  A PET avid pulmonary nodule is evident. A 1.2 x 1.4 cm nodule (series 5, image 322) is present in the medial aspect of the right middle lobe. There is dependent subsegmental atelectasis bilaterally. Additional scattered ground-glass and reticular  opacities are present and may be atelectatic in origin.    MEDIASTINUM/MELLISSA: No mediastinal or hilar lymphadenopathy. No pathologic activity. The heart is borderline enlarged. Aortic annular calcifications are observed. Atherosclerotic vascular calcifications are present in the coronary vessels. The thoracic  aorta has normal-variant two-vessel configuration with a shared origin of the brachiocephalic trunk and left common carotid artery. The ascending thoracic aorta is dilated in caliber, measuring 4.0 cm in diameter. The main pulmonary trunk is dilated in  caliber, measuring 3.2 cm.  Multiple calcified lymph nodes are present. A large hiatal hernia is evident with substantial intrathoracic herniation of the stomach.  CHEST WALL/AXILLA: A hypermetabolic left supraclavicular lymph node is noted.  ABDOMEN/PELVIS: Prostate measures 4.4 in transverse diameter. Extensive heterogeneous pathologic activity is demonstrated throughout the prostate gland. Multiple PET-avid retroperitoneal and pelvic lymph nodes are demonstrated. Hypermetabolic anterior  aortocaval and pericaval lymph nodes are present in the upper abdomen. Retrocaval lymphadenopathy is seen. Multiple left para-aortic lymph nodes are also appreciated. Caudal preaortic, precaval, and interaortocaval lymph nodes are observed. Extensive  bilateral common iliac chain hypermetabolic lymphadenopathy is appreciated. Additional hypermetabolic obturator and internal iliac chain lymph nodes are noted. Hypermetabolic perirectal lymph nodes are perceived. Infiltrative activity is demonstrated  involving the seminal vesicles bilaterally.  There is physiologic activity in the spleen and kidneys.    MUSCULOSKELETAL: No pathologic  activity. No suspicious lesions on CT imaging. Partial transitional lumbosacral anatomy is noted with lumbarization of the S1 vertebral body. Well-formed pseudoarthroses are present between the transverse processes of the  transitional segment and the sacral ala bilaterally. A rudimentary disc is appreciated between the transitional body and the remainder of the sacral spine.  Mild scoliosis and multilevel degenerative changes of the spine are demonstrated.  Degenerative  changes of the shoulders and hips are also appreciated bilaterally.  OTHER: Focal activity of the right antecubital fossa likely reflects focal radiopharmaceutical extravasation.                    Impression   CONCLUSION:  1. Multifocal hypermetabolic intrapelvic and retroperitoneal lymph nodes, compatible with extensive metastatic disease.     2. Marked heterogeneous activity is demonstrated throughout the prostate gland with suspected local invasive behavior, potentially extending into the seminal vesicles bilaterally.     3. A hypermetabolic left supraclavicular lymph node is likely metastatic.     4. A mildly hypermetabolic right middle lobe pulmonary nodule is concerning for metastatic disease.     5. Ascending thoracic aortic dilatation. Continued surveillance is suggested.     6. Dilatation of the main pulmonary artery trunk may relate to underlying pulmonary hypertension.       7. Large hiatal hernia with substantial intrathoracic herniation of the stomach.     8. Sequela of remote granulomatous disease.     9. Lesser incidental findings as above.           elm-remote.        Dictated by (CST): Juanjo Orellana MD on 8/28/2024 at 11:11 PM      Finalized by (CST): Juanjo Orellana MD on 8/28/2024 at 11:28 PM         OTHER RELEVANT DATA:   none     IMPRESSION: Advanced prostate cancer management radiation oncology and medical oncology.  He has appropriate follow-up with them coming up.  I advised him to continue follow-up with them for his  cancer and return to our clinic if any urologic issues arise.         PLAN:  Follow-up with medical and radiation oncology  Return to clinic as needed    Thank you for referring this very pleasant patient to my clinic. If you have any questions or concerns, please do not hesitate to contact me.    Sincerely,  Amira Ko MD    20 minutes were spent on this patient at this visit obtaining a history, reviewing medical records, developing a treatment plan, counseling and discussing treatment strategy with patient, coordination of care and documentation.     The 21st Century Cures Act makes medical notes available to patients in the interest of transparency.  However, please be advised that this is a medical document.  It is intended as a peer to peer communication.  It is written in medical language and may contain abbreviations or verbiage that are technical and unfamiliar.  It may appear blunt or direct.  Medical documents are intended to carry relevant information, facts as evident, and the clinical opinion of the practitioner.         [1]   Allergies  Allergen Reactions    Bees OTHER (SEE COMMENTS)     Swelling

## 2025-02-17 NOTE — PROGRESS NOTES
Nursing Follow-Up Note    Patient: Sin Rao  YOB: 1950  Age: 74 year old  Radiation Oncologist: Dr. Lyle Fang  Referring Physician: Lyle Fang  Chief Complaint:   Chief Complaint   Patient presents with    Follow - Up     RT     Date: 2/17/2025    Toxicities:   Fatigue Grade 0= None  Constipation Grade 0= None  Diarrhea  Grade 0= None  Dysuria on urination Grade 0= None   Urgency on urination Grade 1= Present- especially with taking diuretic  Frequency on urinationGrade 1= Present- especially with taking diuretic  Urine stream strength Moderate  Urine Incontinence Grade 0= None  Nocturia Grade 1= Present X 3-4 nightly. Drinking water up until bedtime.     Vital Signs: /65 (BP Location: Left arm, Patient Position: Sitting, Cuff Size: large)   Pulse 69   Temp 97.6 °F (36.4 °C) (Temporal)   Resp 16   Wt 98.5 kg (217 lb 3.2 oz)   SpO2 98%   BMI 29.87 kg/m² ,   Wt Readings from Last 6 Encounters:   02/18/25 98.5 kg (217 lb 3.2 oz)   12/18/24 99.3 kg (219 lb)   11/25/24 101 kg (222 lb 9.6 oz)   11/18/24 101.5 kg (223 lb 12.8 oz)   11/11/24 103.2 kg (227 lb 9.6 oz)   11/04/24 102.8 kg (226 lb 9.6 oz)       Allergies:  Allergies[1]    Nursing Note:   Patient seen for follow up with Dr. Fang. Completed prostate RT on 11/27/24. Last seen at [facility]. PSA drawn 12/18/24, Dr. Fang to review with patient. AUA= 11, MARCIA= 6.  Having hot flashes. Remaining active. Patient has Dr. Martinez, labs, and possible ADT on 3/18/25. Plan to follow up with Dr. Fang in 9 months.          [1]   Allergies  Allergen Reactions    Bees OTHER (SEE COMMENTS)     Swelling

## 2025-02-18 ENCOUNTER — OFFICE VISIT (OUTPATIENT)
Dept: RADIATION ONCOLOGY | Facility: HOSPITAL | Age: 75
End: 2025-02-18
Attending: RADIOLOGY
Payer: MEDICARE

## 2025-02-18 VITALS
SYSTOLIC BLOOD PRESSURE: 116 MMHG | TEMPERATURE: 98 F | HEART RATE: 69 BPM | BODY MASS INDEX: 30 KG/M2 | DIASTOLIC BLOOD PRESSURE: 65 MMHG | OXYGEN SATURATION: 98 % | WEIGHT: 217.19 LBS | RESPIRATION RATE: 16 BRPM

## 2025-02-18 PROCEDURE — 99211 OFF/OP EST MAY X REQ PHY/QHP: CPT

## 2025-02-18 NOTE — PROGRESS NOTES
RADIATION ONCOLOGY NOTE    DATE OF VISIT: 2/18/2025    DIAGNOSIS :  LN+ prostate cancer, on ADT/ARSI, PSA 19.24 s/p  XRT on 11/27/2024, current PSA 0.6, doing well.    Dear Maikel Khan Ariza  and colleagues,         As you recall, Mr. Sin Rao is a pleasant 74 year old male, who continues to work part time as a , with LN+ prostate cancer, s/p biopsy for L pelvic node.  Pt presented with abn PSA with 7/17/24 PSA 6/3/24 at 8.17, and MRI +lesion within the right anterior transitional zone as well as a highly suspicious left obturator lymph node that was enlarged.  CT-guided core biopsy of the left pelvic lymph node which showed multiple fragments of lymph node with metastatic poorly differentiated adenocarcinoma compatible with prostate primary.      PSMA PET scan that showed marked heterogeneous activity demonstrated throughout the prostate gland and suspected local invasion.  Multifocal hypermetabolic intrapelvic and retroperitoneal lymph nodes.  There was possible lung dz, mildly hypermetabolic right middle lobe pulmonary nodule as below.  Further w/u was discussed at tumor board and given lower level uptake, further w/u was deferred and .pt has started androgen blockade.    Pt tolerated a course of XRT and is doing well without significant sequela.  Pt continues on Flomax and remains quite functional.  Pt has the expected side effects from ADT.  Pt continues to work and does not have an exercise routine.      Oncology Chemo Meds          8/7/2024    10:00 Bonus Cycle; Day 1    9/17/2024 13:54 Bonus Cycle; [ Day 180 ]    2/25/2025 00:00 Bonus Cycle    8/24/2025 00:00   Oncology Flowsheet   Day, Cycle   [ Day 180, Bonus Cycle ]    Leuprolide (Eligard 6 Month) SC   [ 45 mg ] [ 45 mg ]   leuprolide 45 MG (Lupron Depot 6-Month) IM  45 mg       left upper outer gluteal muscle     sodium chloride 0.9% IV New Bag (unknown dose)         Details          [ Planned dose ]    Administered dose cannot be  determined                No results for input(s): \"WBC\", \"HGB\", \"PLT\", \"NE\", \"NEUT\" in the last 504 hours.    Recent Results (from the past 207588 hours)   PSA - DIAGNOSTIC [E]    Collection Time: 12/18/24  8:47 AM   Result Value Ref Range    Total PSA  0.60 <=4.00 ng/mL     *Note: Due to a large number of results and/or encounters for the requested time period, some results have not been displayed. A complete set of results can be found in Results Review.     PSA:    Lab Results   Component Value Date    PSA 0.60 12/18/2024    PSA 2.46 10/15/2024    PSA 19.24 (H) 08/30/2024    PSA 8.69 (H) 06/03/2024    PSA 0.5 12/27/2018       PSA Screen:    Lab Results   Component Value Date    PSAS 8.17 (H) 04/17/2024    PSAS 2.07 07/21/2022    PSAS 0.82 07/14/2021    PSAS 0.59 10/31/2019         ROS    A 12 Point review of system was obtained and is as above and per HPI and nursing note.         Current Outpatient Medications   Medication Sig Dispense Refill    predniSONE 5 MG Oral Tab Take 1 tablet (5 mg total) by mouth daily. 30 tablet 11    tamsulosin 0.4 MG Oral Cap Take 1 capsule (0.4 mg total) by mouth daily. Take 1/2 hour following the same meal each day 90 capsule 3    hydroCHLOROthiazide 25 MG Oral Tab Take 1 tablet (25 mg total) by mouth daily. 90 tablet 3    olmesartan 20 MG Oral Tab Take 1 tablet (20 mg total) by mouth daily. 90 tablet 3    aspirin 81 MG Oral Tab Take 1 tablet (81 mg total) by mouth daily.      prochlorperazine (COMPAZINE) 10 mg tablet Take 1 tablet (10 mg total) by mouth every 6 (six) hours as needed for Nausea. (Patient not taking: Reported on 11/11/2024) 30 tablet 3    atorvastatin 10 MG Oral Tab Take 1 tablet (10 mg total) by mouth nightly. (Patient not taking: Reported on 11/11/2024) 90 tablet 0       PAIN:   , Pain Score: 0,     ,  ,     ,  ,      ALLERGIES :   Allergies[1]    PAST MEDICAL HISTORY:   has a past medical history of Age-related nuclear cataract of both eyes (10/22/2019), Aortic  atherosclerosis, Dyslipidemia, Essential hypertension, Glaucoma suspect of both eyes (10/22/2019), adenomatous colonic polyps (12/2020), Pancolonic diverticulosis, Prediabetes, Pulmonary hypertension (HCC), and Vitreous floaters of both eyes (10/22/2019).    PAST SURGICAL HISTORY:   has a past surgical history that includes hernia surgery (2008, 2010).    PAST SOCIAL HISTORY   reports that he has never smoked. He has never been exposed to tobacco smoke. He has never used smokeless tobacco. He reports current alcohol use. He reports that he does not use drugs.    PAST FAMILY HISTORY   family history includes Heart Disorder in his brother; Hypertension in his brother, brother, mother, and sister.    Wt Readings from Last 6 Encounters:   02/18/25 98.5 kg (217 lb 3.2 oz)   12/18/24 99.3 kg (219 lb)   11/25/24 101 kg (222 lb 9.6 oz)   11/18/24 101.5 kg (223 lb 12.8 oz)   11/11/24 103.2 kg (227 lb 9.6 oz)   11/04/24 102.8 kg (226 lb 9.6 oz)        PHYSICAL EXAM  Blood pressure 116/65, pulse 69, temperature 97.6 °F (36.4 °C), temperature source Temporal, resp. rate 16, weight 98.5 kg (217 lb 3.2 oz), SpO2 98%.  PERFORMANCE STATUS  0,  denies PAIN  GENERAL:  Pt is alert and oriented times three in no acute distress.    HEENT:  PERRLA, EOMI,   NECK:  Supple with no  lymphadenopathy.   CHEST:  Clear   ABDOMEN:  Central weight gain.  Soft with no masses.   EXTREMITIES:  There is no upper or lower extremity edema.    NEUROLOGIC:  Cranial nerves II-XII are intact.  Neuro exam is nonfocal.    COMPLETED TESTS:  I have reviewed the patient's clinical, radiographic, pathologic and laboratory studies.    ASSESSMENT/PLAN    73 yo with LN+ prostate cancer, on ADT/ARSI, PSA 19.24 s/p  XRT on 11/27/2024, current PSA 0.6, doing well.    Pt tolerated a course of XRT and is doing well without significant sequela.  Pt continues on Flomax and remains quite functional.  Pt has the expected side effects from ADT.      Pt continues to work and  does not have an exercise routine and was advised to do so.    Pt will continue to follow closely with Dr. Martinez long term.    Ideally pt will continue to have long term PSA control and I have discussed the role of XRT if needed.    As the patient is doing well, I will see patient in 9 months for a routine follow-up visit.     Thank you for allowing me to take care of your patient.  Please do not hesitate to contact me directly.    Lyle Fang MD, FACRO  Radiation Oncology  [email or web address]  [phone]    2/18/2025                    [1]   Allergies  Allergen Reactions    Bees OTHER (SEE COMMENTS)     Swelling

## 2025-02-18 NOTE — PATIENT INSTRUCTIONS
Follow up with Dr. Fang in 9 months. Dr. Martinez will order your PSA tests.  Beverly will call you to schedule your follow up appointment.     1 week prior to PSA blood test, please refrain from bike riding, sexual activity and no prostate exams.    Start exercise routine    Maintain healthy diet.     Please call [phone] with any radiation questions.

## 2025-03-12 DIAGNOSIS — C61 PROSTATE CANCER (HCC): Primary | ICD-10-CM

## 2025-03-12 DIAGNOSIS — C61 PROSTATE CANCER (HCC): ICD-10-CM

## 2025-03-12 RX ORDER — ABIRATERONE ACETATE 250 MG/1
1000 TABLET ORAL DAILY
Qty: 120 TABLET | Refills: 11 | Status: SHIPPED | OUTPATIENT
Start: 2025-03-12 | End: 2025-03-12

## 2025-03-12 RX ORDER — ABIRATERONE ACETATE 250 MG/1
1000 TABLET ORAL DAILY
Qty: 120 TABLET | Refills: 11 | Status: SHIPPED | OUTPATIENT
Start: 2025-03-12

## 2025-03-18 ENCOUNTER — NURSE ONLY (OUTPATIENT)
Age: 75
End: 2025-03-18
Attending: INTERNAL MEDICINE
Payer: MEDICARE

## 2025-03-18 ENCOUNTER — OFFICE VISIT (OUTPATIENT)
Age: 75
End: 2025-03-18
Attending: INTERNAL MEDICINE
Payer: MEDICARE

## 2025-03-18 VITALS
SYSTOLIC BLOOD PRESSURE: 147 MMHG | HEIGHT: 71.5 IN | OXYGEN SATURATION: 97 % | BODY MASS INDEX: 29.58 KG/M2 | RESPIRATION RATE: 16 BRPM | WEIGHT: 216 LBS | TEMPERATURE: 98 F | DIASTOLIC BLOOD PRESSURE: 77 MMHG | HEART RATE: 56 BPM

## 2025-03-18 DIAGNOSIS — C61 PROSTATE CANCER (HCC): Primary | ICD-10-CM

## 2025-03-18 DIAGNOSIS — C77.8: ICD-10-CM

## 2025-03-18 DIAGNOSIS — Z51.81 THERAPEUTIC DRUG MONITORING: ICD-10-CM

## 2025-03-18 DIAGNOSIS — R91.1 LUNG NODULE: ICD-10-CM

## 2025-03-18 LAB
ALBUMIN SERPL-MCNC: 4.2 G/DL (ref 3.2–4.8)
ALBUMIN/GLOB SERPL: 1.7 {RATIO} (ref 1–2)
ALP LIVER SERPL-CCNC: 64 U/L
ALT SERPL-CCNC: 8 U/L
ANION GAP SERPL CALC-SCNC: 5 MMOL/L (ref 0–18)
AST SERPL-CCNC: 16 U/L (ref ?–34)
BASOPHILS # BLD AUTO: 0.03 X10(3) UL (ref 0–0.2)
BASOPHILS NFR BLD AUTO: 0.5 %
BILIRUB SERPL-MCNC: 0.5 MG/DL (ref 0.2–1.1)
BUN BLD-MCNC: 18 MG/DL (ref 9–23)
BUN/CREAT SERPL: 16.5 (ref 10–20)
CALCIUM BLD-MCNC: 9.3 MG/DL (ref 8.7–10.4)
CHLORIDE SERPL-SCNC: 106 MMOL/L (ref 98–112)
CO2 SERPL-SCNC: 31 MMOL/L (ref 21–32)
CREAT BLD-MCNC: 1.09 MG/DL
DEPRECATED RDW RBC AUTO: 40 FL (ref 35.1–46.3)
EGFRCR SERPLBLD CKD-EPI 2021: 70 ML/MIN/1.73M2 (ref 60–?)
EOSINOPHIL # BLD AUTO: 0.07 X10(3) UL (ref 0–0.7)
EOSINOPHIL NFR BLD AUTO: 1.1 %
ERYTHROCYTE [DISTWIDTH] IN BLOOD BY AUTOMATED COUNT: 12.2 % (ref 11–15)
GLOBULIN PLAS-MCNC: 2.5 G/DL (ref 2–3.5)
GLUCOSE BLD-MCNC: 92 MG/DL (ref 70–99)
HCT VFR BLD AUTO: 36.5 %
HGB BLD-MCNC: 12.4 G/DL
IMM GRANULOCYTES # BLD AUTO: 0.02 X10(3) UL (ref 0–1)
IMM GRANULOCYTES NFR BLD: 0.3 %
LYMPHOCYTES # BLD AUTO: 1.52 X10(3) UL (ref 1–4)
LYMPHOCYTES NFR BLD AUTO: 23.1 %
MCH RBC QN AUTO: 30.3 PG (ref 26–34)
MCHC RBC AUTO-ENTMCNC: 34 G/DL (ref 31–37)
MCV RBC AUTO: 89.2 FL
MONOCYTES # BLD AUTO: 0.79 X10(3) UL (ref 0.1–1)
MONOCYTES NFR BLD AUTO: 12 %
NEUTROPHILS # BLD AUTO: 4.16 X10 (3) UL (ref 1.5–7.7)
NEUTROPHILS # BLD AUTO: 4.16 X10(3) UL (ref 1.5–7.7)
NEUTROPHILS NFR BLD AUTO: 63 %
OSMOLALITY SERPL CALC.SUM OF ELEC: 296 MOSM/KG (ref 275–295)
PLATELET # BLD AUTO: 238 10(3)UL (ref 150–450)
POTASSIUM SERPL-SCNC: 3.8 MMOL/L (ref 3.5–5.1)
PROT SERPL-MCNC: 6.7 G/DL (ref 5.7–8.2)
PSA SERPL-MCNC: 0.25 NG/ML (ref ?–4)
RBC # BLD AUTO: 4.09 X10(6)UL
SODIUM SERPL-SCNC: 142 MMOL/L (ref 136–145)
WBC # BLD AUTO: 6.6 X10(3) UL (ref 4–11)

## 2025-03-18 NOTE — PROGRESS NOTES
[facility] Hematology/Oncology Progress Note    Patient Name: Sin Rao   YOB: 1950   Medical Record Number: [id number]   CSN: [id number]   Consulting Physician: Caleb Martinez MD  Referring Physician(s): Dr. Amira Ko MD    Reason for  Visit:  Prostate Cancer   Cancer Staging   Prostate cancer (HCC)  Staging form: Prostate, AJCC 8th Edition  - Clinical: Stage IVB (cT2, cN1, cM1a, PSA: 8.2) - Signed by Caleb Martinez MD on 8/30/2024    Diagnosis  1. Prostate cancer (HCC)    2. 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 Therapeutic drug monitoring    4. Lung nodule      Current Therapy  ADT q6mos  Pat/Prednisone - started 9/2024    Interval History  Patient returns for follow-up.  Since his last visit here he continues on abiraterone and prednisone.   He continues to work part-time as a  for [employer].  He reports occasional hot flashes but no new bone pain.   Patient denies any nausea, vomiting, fevers, chills or pain. Pt also denies any bleeding, specifically hematuria, hematemesis or hemoptysis.     Past Oncologic History   Sin Rao is a 74 year old male that [facility] for recently diagnosed prostate cancer. His oncologic history is detailed below    7/17/24 PSA 6/3/24 was elevated to 8.17, which prompted an MRI that showed a PI-RADS 3 lesion within the right anterior transitional zone as well as a highly suspicious left obturator lymph node that was enlarged.  Seminal vesicles were unremarkable.    8/7/2024 patient underwent a CT-guided core biopsy of the left pelvic lymph node which showed multiple fragments of lymph node with metastatic poorly differentiated adenocarcinoma compatible with prostate primary.  This was positive for CK8/18 CK20 and PSA negative for CK7 and TTF-1.    8/28/2024 the patient underwent a PSMA PET scan that showed marked heterogeneous activity demonstrated throughout the prostate gland  and suspected local invasion.  Multifocal hypermetabolic intrapelvic and retroperitoneal lymph nodes compatible extensive metastatic disease.(Including multiple anterior aortocaval and pericaval lymph nodes as well as retrocaval lymphadenopathy.]  There is also appear to be hypermetabolic left supraclavicular lymph node as well as a mildly hypermetabolic right middle lobe pulmonary nodule that was concerning for metastatic disease.    9/2024 pretreatment PSA had increased to 19.2.  He was started on androgen deprivation therapy with leuprolide and abiraterone and prednisone.    10/21/24 - 12/2024 Completed EBRT 7000cGy over 30 fractions    Past Medical History:  Past Medical History:    Age-related nuclear cataract of both eyes    Aortic atherosclerosis    CXR 12-19    Dyslipidemia    Essential hypertension    Glaucoma suspect of both eyes    Hx of adenomatous colonic polyps    polyps removed, patient can repeat in 5 years IF remains in good health/chooses to continue screening    Pancolonic diverticulosis    Colonoscopy 12-20    Prediabetes    Pulmonary hypertension (HCC)    Echo 8-21 with mild LVH, EF 65-70%, mild AI, PAP 34 mmHg    Vitreous floaters of both eyes       Past Surgical History:  Past Surgical History:   Procedure Laterality Date    Hernia surgery  2008, 2010       Family Medical History:  Family History   Problem Relation Age of Onset    Hypertension Mother     Hypertension Sister     Hypertension Brother     Hypertension Brother     Heart Disorder Brother     Diabetes Neg     Glaucoma Neg     Macular degeneration Neg        Gyne History:      Social History:  Social History     Socioeconomic History    Marital status: Single     Spouse name: Not on file    Number of children: Not on file    Years of education: Not on file    Highest education level: Not on file   Occupational History    Not on file   Tobacco Use    Smoking status: Never     Passive exposure: Never    Smokeless tobacco: Never    Vaping Use    Vaping status: Never Used   Substance and Sexual Activity    Alcohol use: Yes     Comment: 1x/month    Drug use: No    Sexual activity: Not on file   Other Topics Concern    Not on file   Social History Narrative    Not on file     Social Drivers of Health     Food Insecurity: Not on file   Transportation Needs: Not on file   Housing Stability: Not on file       Allergies:   Allergies   Allergen Reactions    Bees OTHER (SEE COMMENTS)     Swelling       Current Medications:   Abiraterone Acetate 250 MG Oral Tab Take 1,000 mg by mouth daily. Take with a low fat meal 120 tablet 11    predniSONE 5 MG Oral Tab Take 1 tablet (5 mg total) by mouth daily. 30 tablet 11    tamsulosin 0.4 MG Oral Cap Take 1 capsule (0.4 mg total) by mouth daily. Take 1/2 hour following the same meal each day 90 capsule 3    hydroCHLOROthiazide 25 MG Oral Tab Take 1 tablet (25 mg total) by mouth daily. 90 tablet 3    olmesartan 20 MG Oral Tab Take 1 tablet (20 mg total) by mouth daily. 90 tablet 3    aspirin 81 MG Oral Tab Take 1 tablet (81 mg total) by mouth daily.         Review of Systems:  A comprehensive 14 point review of systems was completed.  Pertinent positives and negatives noted in the the HPI.     Vital Signs:  /77 (BP Location: Right arm, Patient Position: Sitting, Cuff Size: adult)   Pulse 56   Temp 97.7 °F (36.5 °C) (Oral)   Resp 16   Ht 1.816 m (5' 11.5\")   Wt 98 kg (216 lb)   SpO2 97%   BMI 29.71 kg/m²    Physical Examination:    General: Patient is alert and oriented x 3, not in acute distress.  Psych:  awake and alert, no distress  HEENT: EOMs intact. PERRL. Oropharynx is clear.   Neck: No JVD. No palpable lymphadenopathy. Neck is supple.  Lymphatics: There is no palpable lymphadenopathy throughout in the cervical, supraclavicular, axillary, or inguinal regions.  Chest: Clear to auscultation. No wheezes or rales.  Heart: Regular rate and rhythm. S1S2 normal.  Extremities: No edema or calf  tenderness.  Neurological: Grossly intact.     Performance Status:  ECOG - 0    Labs:    Lab Results   Component Value Date/Time    WBC 6.6 2025 01:05 PM    RBC 4.09 2025 01:05 PM    HGB 12.4 (L) 2025 01:05 PM    HCT 36.5 (L) 2025 01:05 PM    MCV 89.2 2025 01:05 PM    MCH 30.3 2025 01:05 PM    MCHC 34.0 2025 01:05 PM    RDW 12.2 2025 01:05 PM    NEPRELIM 4.16 2025 01:05 PM    .0 2025 01:05 PM       Lab Results   Component Value Date/Time    GLU 92 2025 01:05 PM    BUN 18 2025 01:05 PM    CREATSERUM 1.09 2025 01:05 PM    GFRNAA 57 (L) 2022 08:32 AM    CA 9.3 2025 01:05 PM    ALB 4.2 2025 01:05 PM     2025 01:05 PM    K 3.8 2025 01:05 PM     2025 01:05 PM    CO2 31.0 2025 01:05 PM    ALKPHO 64 2025 01:05 PM    AST 16 2025 01:05 PM    ALT 8 (L) 2025 01:05 PM       Imagin/18 Reviewed CT films personally     Pathology:  See above    Impression:      ICD-10-CM    1. Prostate cancer (HCC)  C61       2. 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 Therapeutic drug monitoring  Z51.81       4. Lung nodule  R91.1       I had a long discussion with the patient and explained that unfortunately he has prostate cancer that is metastatic to multiple regional and nonregional lymph nodes.    We discussed treatment options, with systemic androgen deprivation therapy along with an oral androgen receptor signaling inhibitor.  He appears to have low burden of metastatic disease with primary nonregional vik involvement  involvement, so I have recommended consideration of radiation therapy to the prostate    He was standing on ADT and abiraterone and prednisone is tolerating this well and has completed XRT to prostate/pelvic LN per Dr. Fang of Radiation Oncology    Plan:  S/p leuprolide today. Continue abiraterone/prednisone, redose Lupron in 6  mos  RML Lung nodule: unclear etiology, Stable on fu CT,  will repeat in 6 mos  RTC in 3 mos  PSA continues to decline as expected   Hot flashes: not too bothersome, monitor    Thank you Dr Amira Ko for the opportunity to participate in the care of this interesting patient. Please do contact me if I may be of any further assistance    Caleb Martinez MD  [facility] Hematology/Oncology    Copy to : Dr. Rasheeda Boss MD

## 2025-04-17 ENCOUNTER — TELEPHONE (OUTPATIENT)
Dept: FAMILY MEDICINE CLINIC | Facility: CLINIC | Age: 75
End: 2025-04-17

## 2025-04-17 DIAGNOSIS — I10 ESSENTIAL HYPERTENSION: ICD-10-CM

## 2025-04-17 RX ORDER — OLMESARTAN MEDOXOMIL 20 MG/1
20 TABLET ORAL DAILY
Qty: 90 TABLET | Refills: 0 | Status: SHIPPED | OUTPATIENT
Start: 2025-04-17

## 2025-04-17 NOTE — TELEPHONE ENCOUNTER
Please call patient to make an appointment with new establish care provider and [employer] message sent,thank you.

## 2025-04-17 NOTE — TELEPHONE ENCOUNTER
olmesartan 20 MG Oral Tab, Take 1 tablet (20 mg total) by mouth daily., Disp: 90 tablet, Rfl: 3

## 2025-04-17 NOTE — TELEPHONE ENCOUNTER
Please review; protocol failed/ has no protocol      Former patient of Gera Scales   Message sent for patient to make an appointment.

## 2025-05-12 NOTE — ASSESSMENT & PLAN NOTE
Lipid panel and liver function tests have been stable on diet control alone. Continue on strict diet control and will monitor. [Never] : Never

## 2025-06-17 ENCOUNTER — TELEPHONE (OUTPATIENT)
Age: 75
End: 2025-06-17

## 2025-06-17 NOTE — TELEPHONE ENCOUNTER
Left message for Sin to see if he can come in tomorrow at 815 for a lab appointment prior to his MD appointment as he should have a lab appointment scheduled. Left call back number for him to call back and blocked 815 lab appointment for him for 6/18.

## 2025-06-18 ENCOUNTER — TELEPHONE (OUTPATIENT)
Age: 75
End: 2025-06-18

## 2025-06-18 ENCOUNTER — OFFICE VISIT (OUTPATIENT)
Age: 75
End: 2025-06-18
Attending: INTERNAL MEDICINE
Payer: MEDICARE

## 2025-06-18 ENCOUNTER — NURSE ONLY (OUTPATIENT)
Age: 75
End: 2025-06-18
Attending: INTERNAL MEDICINE
Payer: MEDICARE

## 2025-06-18 VITALS
OXYGEN SATURATION: 95 % | BODY MASS INDEX: 29.69 KG/M2 | RESPIRATION RATE: 18 BRPM | SYSTOLIC BLOOD PRESSURE: 124 MMHG | WEIGHT: 216.81 LBS | HEIGHT: 71.5 IN | TEMPERATURE: 98 F | HEART RATE: 75 BPM | DIASTOLIC BLOOD PRESSURE: 78 MMHG

## 2025-06-18 DIAGNOSIS — R23.2 VASOMOTOR FLUSHING: ICD-10-CM

## 2025-06-18 DIAGNOSIS — C61 PROSTATE CANCER (HCC): ICD-10-CM

## 2025-06-18 DIAGNOSIS — R91.1 LUNG NODULE: ICD-10-CM

## 2025-06-18 DIAGNOSIS — C77.8: ICD-10-CM

## 2025-06-18 DIAGNOSIS — Z51.81 THERAPEUTIC DRUG MONITORING: ICD-10-CM

## 2025-06-18 DIAGNOSIS — C61 PROSTATE CANCER (HCC): Primary | ICD-10-CM

## 2025-06-18 LAB
ALBUMIN SERPL-MCNC: 4.2 G/DL (ref 3.2–4.8)
ALBUMIN/GLOB SERPL: 1.9 {RATIO} (ref 1–2)
ALP LIVER SERPL-CCNC: 66 U/L (ref 45–117)
ALT SERPL-CCNC: <7 U/L (ref 10–49)
ANION GAP SERPL CALC-SCNC: 5 MMOL/L (ref 0–18)
AST SERPL-CCNC: 13 U/L (ref ?–34)
BASOPHILS # BLD AUTO: 0.02 X10(3) UL (ref 0–0.2)
BASOPHILS NFR BLD AUTO: 0.4 %
BILIRUB SERPL-MCNC: 0.6 MG/DL (ref 0.2–1.1)
BUN BLD-MCNC: 18 MG/DL (ref 9–23)
BUN/CREAT SERPL: 17.8 (ref 10–20)
CALCIUM BLD-MCNC: 9.4 MG/DL (ref 8.7–10.4)
CHLORIDE SERPL-SCNC: 106 MMOL/L (ref 98–112)
CO2 SERPL-SCNC: 31 MMOL/L (ref 21–32)
CREAT BLD-MCNC: 1.01 MG/DL (ref 0.7–1.3)
DEPRECATED RDW RBC AUTO: 44.7 FL (ref 35.1–46.3)
EGFRCR SERPLBLD CKD-EPI 2021: 77 ML/MIN/1.73M2 (ref 60–?)
EOSINOPHIL # BLD AUTO: 0.06 X10(3) UL (ref 0–0.7)
EOSINOPHIL NFR BLD AUTO: 1.1 %
ERYTHROCYTE [DISTWIDTH] IN BLOOD BY AUTOMATED COUNT: 14 % (ref 11–15)
GLOBULIN PLAS-MCNC: 2.2 G/DL (ref 2–3.5)
GLUCOSE BLD-MCNC: 95 MG/DL (ref 70–99)
HCT VFR BLD AUTO: 35.9 % (ref 39–53)
HGB BLD-MCNC: 11.6 G/DL (ref 13–17.5)
IMM GRANULOCYTES # BLD AUTO: 0.04 X10(3) UL (ref 0–1)
IMM GRANULOCYTES NFR BLD: 0.7 %
LYMPHOCYTES # BLD AUTO: 1.42 X10(3) UL (ref 1–4)
LYMPHOCYTES NFR BLD AUTO: 25.9 %
MCH RBC QN AUTO: 28.4 PG (ref 26–34)
MCHC RBC AUTO-ENTMCNC: 32.3 G/DL (ref 31–37)
MCV RBC AUTO: 87.8 FL (ref 80–100)
MONOCYTES # BLD AUTO: 0.6 X10(3) UL (ref 0.1–1)
MONOCYTES NFR BLD AUTO: 10.9 %
NEUTROPHILS # BLD AUTO: 3.34 X10 (3) UL (ref 1.5–7.7)
NEUTROPHILS # BLD AUTO: 3.34 X10(3) UL (ref 1.5–7.7)
NEUTROPHILS NFR BLD AUTO: 61 %
OSMOLALITY SERPL CALC.SUM OF ELEC: 296 MOSM/KG (ref 275–295)
PLATELET # BLD AUTO: 289 10(3)UL (ref 150–450)
POTASSIUM SERPL-SCNC: 3.7 MMOL/L (ref 3.5–5.1)
PROT SERPL-MCNC: 6.4 G/DL (ref 5.7–8.2)
PSA SERPL-MCNC: 0.18 NG/ML (ref ?–4)
RBC # BLD AUTO: 4.09 X10(6)UL (ref 3.8–5.8)
SODIUM SERPL-SCNC: 142 MMOL/L (ref 136–145)
WBC # BLD AUTO: 5.5 X10(3) UL (ref 4–11)

## 2025-06-18 NOTE — PROGRESS NOTES
[facility] Hematology/Oncology Progress Note    Patient Name: Sin Rao   YOB: 1950   Medical Record Number: [id number]   CSN: [id number]   Consulting Physician: Caleb Martinez MD  Referring Physician(s): Dr. Amira Ko MD    Reason for  Visit:  Prostate Cancer   Cancer Staging   Prostate cancer (HCC)  Staging form: Prostate, AJCC 8th Edition  - Clinical: Stage IVB (cT2, cN1, cM1a, PSA: 8.2) - Signed by Caleb Martinez MD on 8/30/2024    Diagnosis  1. Prostate cancer (HCC)    2. 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 Therapeutic drug monitoring    4. Vasomotor flushing    5. Lung nodule      Current Therapy  ADT q6mos  Pat/Prednisone - started 9/2024  S/p EBRT 12/2024    Interval History  Patient returns for follow-up.  Since his last visit here he continues on abiraterone and prednisone.   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  He reports occasional hot flashes but no new bone pain.   Patient denies any nausea, vomiting, fevers, chills or pain. Pt also denies any bleeding, specifically hematuria, hematemesis or hemoptysis.     Past Oncologic History   Sin Rao is a 74 year old male that was seen today in the Cancer Center for recently diagnosed prostate cancer. His oncologic history is detailed below    7/17/24 PSA 6/3/24 was elevated to 8.17, which prompted an MRI that showed a PI-RADS 3 lesion within the right anterior transitional zone as well as a highly suspicious left obturator lymph node that was enlarged.  Seminal vesicles were unremarkable.    8/7/2024 patient underwent a CT-guided core biopsy of the left pelvic lymph node which showed multiple fragments of lymph node with metastatic poorly differentiated adenocarcinoma compatible with prostate primary.  This was positive for CK8/18 CK20 and PSA negative for CK7 and TTF-1.    8/28/2024 the patient underwent a PSMA PET scan that  showed marked heterogeneous activity demonstrated throughout the prostate gland and suspected local invasion.  Multifocal hypermetabolic intrapelvic and retroperitoneal lymph nodes compatible extensive metastatic disease.(Including multiple anterior aortocaval and pericaval lymph nodes as well as retrocaval lymphadenopathy.]  There is also appear to be hypermetabolic left supraclavicular lymph node as well as a mildly hypermetabolic right middle lobe pulmonary nodule that was concerning for metastatic disease.    9/2024 pretreatment PSA had increased to 19.2.  He was started on androgen deprivation therapy with leuprolide and abiraterone and prednisone.    10/21/24 - 12/2024 Completed EBRT 7000cGy over 30 fractions    Past Medical History:  Past Medical History:    Age-related nuclear cataract of both eyes    Aortic atherosclerosis    CXR 12-19    Dyslipidemia    Essential hypertension    Glaucoma suspect of both eyes    Hx of adenomatous colonic polyps    polyps removed, patient can repeat in 5 years IF remains in good health/chooses to continue screening    Pancolonic diverticulosis    Colonoscopy 12-20    Prediabetes    Pulmonary hypertension (HCC)    Echo 8-21 with mild LVH, EF 65-70%, mild AI, PAP 34 mmHg    Vitreous floaters of both eyes       Past Surgical History:  Past Surgical History:   Procedure Laterality Date    Hernia surgery  2008, 2010       Family Medical History:  Family History   Problem Relation Age of Onset    Hypertension Mother     Hypertension Sister     Hypertension Brother     Hypertension Brother     Heart Disorder Brother     Diabetes Neg     Glaucoma Neg     Macular degeneration Neg        Gyne History:      Social History:  Social History     Socioeconomic History    Marital status: Single     Spouse name: Not on file    Number of children: Not on file    Years of education: Not on file    Highest education level: Not on file   Occupational History    Not on file   Tobacco Use     Smoking status: Never     Passive exposure: Never    Smokeless tobacco: Never   Vaping Use    Vaping status: Never Used   Substance and Sexual Activity    Alcohol use: Yes     Comment: 1x/month    Drug use: No    Sexual activity: Not on file   Other Topics Concern    Not on file   Social History Narrative    Not on file     Social Drivers of Health     Food Insecurity: Not on file   Transportation Needs: Not on file   Housing Stability: Not on file       Allergies:   Allergies   Allergen Reactions    Bees OTHER (SEE COMMENTS)     Swelling       Current Medications:  No outpatient medications have been marked as taking for the 6/18/25 encounter (Office Visit) with Caleb Martinez MD.       Review of Systems:  A comprehensive 14 point review of systems was completed.  Pertinent positives and negatives noted in the the HPI.     Vital Signs:  There were no vitals taken for this visit.   Physical Examination:    General: Patient is alert and oriented x 3, not in acute distress.  Psych:  awake and alert, no distress  HEENT: EOMs intact. PERRL. Oropharynx is clear.   Neck: No JVD. No palpable lymphadenopathy. Neck is supple.  Lymphatics: There is no palpable lymphadenopathy throughout in the cervical, supraclavicular, axillary, or inguinal regions.  Chest: Clear to auscultation. No wheezes or rales.  Heart: Regular rate and rhythm. S1S2 normal.  Extremities: No edema or calf tenderness.  Neurological: Grossly intact.     Performance Status:  ECOG - 0    Labs:    Lab Results   Component Value Date/Time    WBC 6.6 03/18/2025 01:05 PM    RBC 4.09 03/18/2025 01:05 PM    HGB 12.4 (L) 03/18/2025 01:05 PM    HCT 36.5 (L) 03/18/2025 01:05 PM    MCV 89.2 03/18/2025 01:05 PM    MCH 30.3 03/18/2025 01:05 PM    MCHC 34.0 03/18/2025 01:05 PM    RDW 12.2 03/18/2025 01:05 PM    NEPRELIM 4.16 03/18/2025 01:05 PM    .0 03/18/2025 01:05 PM       Lab Results   Component Value Date/Time    GLU 92 03/18/2025 01:05 PM    BUN 18 03/18/2025  01:05 PM    CREATSERUM 1.09 2025 01:05 PM    GFRNAA 57 (L) 2022 08:32 AM    CA 9.3 2025 01:05 PM    ALB 4.2 2025 01:05 PM     2025 01:05 PM    K 3.8 2025 01:05 PM     2025 01:05 PM    CO2 31.0 2025 01:05 PM    ALKPHO 64 2025 01:05 PM    AST 16 2025 01:05 PM    ALT 8 (L) 2025 01:05 PM       Imagin/18 Reviewed CT films personally     Pathology:  See above    Impression:      ICD-10-CM    1. Prostate cancer (HCC)  C61       2. 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 Therapeutic drug monitoring  Z51.81       4. Vasomotor flushing  R23.2       5. Lung nodule  R91.1       I had a long discussion with the patient and explained that unfortunately he has prostate cancer that is metastatic to multiple regional and nonregional lymph nodes.    We discussed treatment options, with systemic androgen deprivation therapy along with an oral androgen receptor signaling inhibitor.  He appears to have low burden of metastatic disease with primary nonregional vik involvement  involvement, so I have recommended consideration of radiation therapy to the prostate    He was standing on ADT and abiraterone and prednisone is tolerating this well and has completed XRT to prostate/pelvic LN per Dr. Fang of Radiation Oncology    Plan:   Continue abiraterone/prednisone, redose Lupron in 3 mos  RML Lung nodule: unclear etiology, needs repeat CT soon   RTC in 3 mos  Await PSA today   hot flashes: not too bothersome, monitor    Thank you Dr Amira Ko for the opportunity to participate in the care of this interesting patient. Please do contact me if I may be of any further assistance    Caleb Martinez MD  [facility] Hematology/Oncology    Copy to : Dr. Rasheeda Boss MD    [facility]. Our clinic is continuing focal point for all oncologic services the patient needs.

## 2025-06-18 NOTE — TELEPHONE ENCOUNTER
Called patient about scheduled CT scan with date and time of appointment for next Wednesday 6/25. Also, discussed his PSA per Dr. Martinez PSA continues to improve and now 0.18 indicating continued good response to treatment. He voiced understanding and thanked me for the call.

## 2025-06-19 ENCOUNTER — APPOINTMENT (OUTPATIENT)
Age: 75
End: 2025-06-19
Attending: INTERNAL MEDICINE
Payer: MEDICARE

## 2025-06-22 ENCOUNTER — APPOINTMENT (OUTPATIENT)
Dept: GENERAL RADIOLOGY | Facility: HOSPITAL | Age: 75
End: 2025-06-22
Attending: EMERGENCY MEDICINE
Payer: MEDICARE

## 2025-06-22 ENCOUNTER — HOSPITAL ENCOUNTER (EMERGENCY)
Facility: HOSPITAL | Age: 75
Discharge: HOME | End: 2025-06-22
Attending: EMERGENCY MEDICINE
Payer: MEDICARE

## 2025-06-22 VITALS
DIASTOLIC BLOOD PRESSURE: 71 MMHG | TEMPERATURE: 98 F | HEART RATE: 72 BPM | RESPIRATION RATE: 18 BRPM | SYSTOLIC BLOOD PRESSURE: 120 MMHG | OXYGEN SATURATION: 95 %

## 2025-06-22 DIAGNOSIS — E87.6 HYPOKALEMIA: ICD-10-CM

## 2025-06-22 DIAGNOSIS — R55 SYNCOPE, NEAR: Primary | ICD-10-CM

## 2025-06-22 LAB
ALBUMIN SERPL-MCNC: 4.1 G/DL (ref 3.2–4.8)
ALP LIVER SERPL-CCNC: 64 U/L (ref 45–117)
ALT SERPL-CCNC: <7 U/L (ref 10–49)
ANION GAP SERPL CALC-SCNC: 9 MMOL/L (ref 0–18)
AST SERPL-CCNC: 14 U/L (ref ?–34)
ATRIAL RATE: 59 BPM
ATRIAL RATE: 67 BPM
BASOPHILS # BLD AUTO: 0.02 X10(3) UL (ref 0–0.2)
BASOPHILS NFR BLD AUTO: 0.3 %
BILIRUB DIRECT SERPL-MCNC: 0.2 MG/DL (ref ?–0.3)
BILIRUB SERPL-MCNC: 0.7 MG/DL (ref 0.2–1.1)
BUN BLD-MCNC: 16 MG/DL (ref 9–23)
BUN/CREAT SERPL: 15.7 (ref 10–20)
CALCIUM BLD-MCNC: 9.2 MG/DL (ref 8.7–10.4)
CHLORIDE SERPL-SCNC: 106 MMOL/L (ref 98–112)
CO2 SERPL-SCNC: 25 MMOL/L (ref 21–32)
CREAT BLD-MCNC: 1.02 MG/DL (ref 0.7–1.3)
DEPRECATED RDW RBC AUTO: 43.2 FL (ref 35.1–46.3)
EGFRCR SERPLBLD CKD-EPI 2021: 76 ML/MIN/1.73M2 (ref 60–?)
EOSINOPHIL # BLD AUTO: 0.07 X10(3) UL (ref 0–0.7)
EOSINOPHIL NFR BLD AUTO: 1 %
ERYTHROCYTE [DISTWIDTH] IN BLOOD BY AUTOMATED COUNT: 13.7 % (ref 11–15)
GLUCOSE BLD-MCNC: 129 MG/DL (ref 70–99)
GLUCOSE BLDC GLUCOMTR-MCNC: 132 MG/DL (ref 70–99)
HCT VFR BLD AUTO: 34.7 % (ref 39–53)
HGB BLD-MCNC: 11.5 G/DL (ref 13–17.5)
IMM GRANULOCYTES # BLD AUTO: 0.04 X10(3) UL (ref 0–1)
IMM GRANULOCYTES NFR BLD: 0.6 %
LYMPHOCYTES # BLD AUTO: 2.45 X10(3) UL (ref 1–4)
LYMPHOCYTES NFR BLD AUTO: 35.5 %
MCH RBC QN AUTO: 28.5 PG (ref 26–34)
MCHC RBC AUTO-ENTMCNC: 33.1 G/DL (ref 31–37)
MCV RBC AUTO: 85.9 FL (ref 80–100)
MONOCYTES # BLD AUTO: 0.72 X10(3) UL (ref 0.1–1)
MONOCYTES NFR BLD AUTO: 10.4 %
NEUTROPHILS # BLD AUTO: 3.61 X10 (3) UL (ref 1.5–7.7)
NEUTROPHILS # BLD AUTO: 3.61 X10(3) UL (ref 1.5–7.7)
NEUTROPHILS NFR BLD AUTO: 52.2 %
OSMOLALITY SERPL CALC.SUM OF ELEC: 293 MOSM/KG (ref 275–295)
P AXIS: 44 DEGREES
P AXIS: 45 DEGREES
P-R INTERVAL: 172 MS
P-R INTERVAL: 182 MS
PLATELET # BLD AUTO: 291 10(3)UL (ref 150–450)
POTASSIUM SERPL-SCNC: 3.2 MMOL/L (ref 3.5–5.1)
PROT SERPL-MCNC: 6.1 G/DL (ref 5.7–8.2)
Q-T INTERVAL: 462 MS
Q-T INTERVAL: 484 MS
QRS DURATION: 122 MS
QRS DURATION: 134 MS
QTC CALCULATION (BEZET): 479 MS
QTC CALCULATION (BEZET): 488 MS
R AXIS: 11 DEGREES
R AXIS: 22 DEGREES
RBC # BLD AUTO: 4.04 X10(6)UL (ref 3.8–5.8)
SODIUM SERPL-SCNC: 140 MMOL/L (ref 136–145)
T AXIS: 13 DEGREES
T AXIS: 16 DEGREES
TROPONIN I SERPL HS-MCNC: 5 NG/L (ref ?–53)
TROPONIN I SERPL HS-MCNC: 5 NG/L (ref ?–53)
VENTRICULAR RATE: 59 BPM
VENTRICULAR RATE: 67 BPM
WBC # BLD AUTO: 6.9 X10(3) UL (ref 4–11)

## 2025-06-22 PROCEDURE — 82962 GLUCOSE BLOOD TEST: CPT

## 2025-06-22 PROCEDURE — 93005 ELECTROCARDIOGRAM TRACING: CPT

## 2025-06-22 PROCEDURE — 80076 HEPATIC FUNCTION PANEL: CPT | Performed by: EMERGENCY MEDICINE

## 2025-06-22 PROCEDURE — 80048 BASIC METABOLIC PNL TOTAL CA: CPT | Performed by: EMERGENCY MEDICINE

## 2025-06-22 PROCEDURE — 71045 X-RAY EXAM CHEST 1 VIEW: CPT | Performed by: EMERGENCY MEDICINE

## 2025-06-22 PROCEDURE — 93010 ELECTROCARDIOGRAM REPORT: CPT

## 2025-06-22 PROCEDURE — 85025 COMPLETE CBC W/AUTO DIFF WBC: CPT | Performed by: EMERGENCY MEDICINE

## 2025-06-22 PROCEDURE — 36415 COLL VENOUS BLD VENIPUNCTURE: CPT

## 2025-06-22 PROCEDURE — 84484 ASSAY OF TROPONIN QUANT: CPT | Performed by: EMERGENCY MEDICINE

## 2025-06-22 PROCEDURE — 99284 EMERGENCY DEPT VISIT MOD MDM: CPT

## 2025-06-22 RX ORDER — POTASSIUM CHLORIDE 1500 MG/1
40 TABLET, EXTENDED RELEASE ORAL ONCE
Status: COMPLETED | OUTPATIENT
Start: 2025-06-22 | End: 2025-06-22

## 2025-06-22 NOTE — ED QUICK NOTES
Rounding completed. Patient's vitals updated, as per documentation. Primary RN updated with vitals and care provided. Patient requesting food. Menu provided and food was ordered.     Will continue to monitor. Call light within reach.

## 2025-06-22 NOTE — DISCHARGE INSTRUCTIONS
Avoid the extreme heat.  Stay hydrated.  Eat citrus fruits and bananas.  Have potassium rechecked next week.  Follow-up with your doctor.

## 2025-06-22 NOTE — ED PROVIDER NOTES
Patient Seen in: [facility] Emergency Department        History  Chief Complaint   Patient presents with    Syncope     Stated Complaint: syncope    Subjective:   74-year-old male with history of hypertension, hyperlipidemia here after a near syncopal episode at an outdoor mall in the very hot weather.  Patient states he got up drink a little water but did not eat anything.  He put on a tight neoprene shirt to help him sweat and lose weight and then he went to an outdoor mall to get his phone fixed.  He said he was getting close to the store when he started feeling woozy and lightheaded and hazy.  He leaned up against a glass window and then slid to the ground but did not really lose consciousness.  Did not hit his head.  EMS was called.  They had an initial low blood pressure but started giving him fluids and his blood pressure improved.  Overall he is feeling improved he says his mouth is dry.  No chest pain or palpitations.  No shortness of breath.  No leg swelling.  No focal weakness or numbness.  No palpitations.                      Objective:     Past Medical History:    Age-related nuclear cataract of both eyes    Aortic atherosclerosis    CXR 12-19    Dyslipidemia    Essential hypertension    Glaucoma suspect of both eyes    Hx of adenomatous colonic polyps    polyps removed, patient can repeat in 5 years IF remains in good health/chooses to continue screening    Pancolonic diverticulosis    Colonoscopy 12-20    Prediabetes    Pulmonary hypertension (HCC)    Echo 8-21 with mild LVH, EF 65-70%, mild AI, PAP 34 mmHg    Vitreous floaters of both eyes              Past Surgical History:   Procedure Laterality Date    Hernia surgery  2008, 2010                Social History     Socioeconomic History    Marital status: Single   Tobacco Use    Smoking status: Never     Passive exposure: Never    Smokeless tobacco: Never   Vaping Use    Vaping status: Never Used   Substance and Sexual Activity    Alcohol  use: Yes     Comment: 1x/month    Drug use: No                                Physical Exam    ED Triage Vitals   BP 06/22/25 1130 103/64   Pulse 06/22/25 1128 68   Resp 06/22/25 1128 20   Temp 06/22/25 1139 98.1 °F (36.7 °C)   Temp src --    SpO2 06/22/25 1130 95 %   O2 Device 06/22/25 1130 None (Room air)       Current Vitals:   Vital Signs  BP: 112/62  Pulse: 57  Resp: 12  Temp: 98.1 °F (36.7 °C)  MAP (mmHg): 78    Oxygen Therapy  SpO2: 98 %  O2 Device: None (Room air)            Physical Exam  HENT:      Head: Normocephalic.      Right Ear: External ear normal.      Nose: Nose normal.      Mouth/Throat:      Mouth: Mucous membranes are moist.   Eyes:      Extraocular Movements: Extraocular movements intact.      Pupils: Pupils are equal, round, and reactive to light.   Cardiovascular:      Rate and Rhythm: Normal rate and regular rhythm.      Pulses: Normal pulses.   Pulmonary:      Effort: Pulmonary effort is normal.      Breath sounds: Normal breath sounds.   Abdominal:      Palpations: Abdomen is soft.      Tenderness: There is no abdominal tenderness.   Musculoskeletal:         General: Normal range of motion.      Cervical back: Normal range of motion.      Comments: Superficial abrasion left elbow.  No tenderness.  Full range of motion.   Skin:     General: Skin is warm.      Capillary Refill: Capillary refill takes less than 2 seconds.   Neurological:      Mental Status: He is alert.      Sensory: No sensory deficit.      Motor: No weakness.                 ED Course  Labs Reviewed   CBC WITH DIFFERENTIAL WITH PLATELET - Abnormal; Notable for the following components:       Result Value    HGB 11.5 (*)     HCT 34.7 (*)     All other components within normal limits   HEPATIC FUNCTION PANEL (7) - Abnormal; Notable for the following components:    ALT <7 (*)     All other components within normal limits   BASIC METABOLIC PANEL (8) - Abnormal; Notable for the following components:    Glucose 129 (*)      Potassium 3.2 (*)     All other components within normal limits   POCT GLUCOSE - Abnormal; Notable for the following components:    POC Glucose  132 (*)     All other components within normal limits   TROPONIN I HIGH SENSITIVITY - Normal   TROPONIN I HIGH SENSITIVITY - Normal   RAINBOW DRAW LAVENDER   RAINBOW DRAW LIGHT GREEN   RAINBOW DRAW BLUE   RAINBOW DRAW GOLD     EKG    Rate, intervals and axes as noted on EKG Report.  Rate: 67  Rhythm: Sinus Rhythm  Reading: PAC.  Right bundle branch block.  No ST ovation.  QTc 488.  Abnormal EKG read by myself.  Old EKG dated 2024 did show the old right bundle branch block.           ED Course as of 06/22/25 1515  ------------------------------------------------------------  Time: 06/22 1513  Comment: Patient doing well.  He is eating at this time.  Will replace the potassium all labs independently interpreted by me.  Mild hypokalemia.  CBC showed chronic anemia.  I compared to laboratory studies.  2 troponins normal.  2 EKGs showed no change.  He has a right bundle branch block.  Chest x-ray shows no acute finding but has a hiatal hernia.  He is eating at this time feels good and would like to go home.  Will return for changes or worsening.       EKG #2 shows sinus bradycardia rate of 59, right bundle branch block.  Abnormal EKG.  No significant changes read by myself.                MDM     XR CHEST AP PORTABLE  (CPT=71045)  Result Date: 6/22/2025  CONCLUSION:  1. No acute finding. 2. Moderate to large retrocardiac hiatal hernia.    Dictated by (CST): Samson Sotelo MD on 6/22/2025 at 1:50 PM     Finalized by (CST): Samson Sotelo MD on 6/22/2025 at 1:52 PM                  Medical Decision Making  Patient with near syncope and low blood pressure in the field.  No significant trauma noted.  Could have been arrhythmia, ACS, dehydration, vagal episode, heat related and many other possibilities.  Will continue to monitor but has a blood pressure of 103/64 with a normal heart  rate and a normal rhythm and a normal pulse oximetry of 95% on room air.  EKG unchanged from old EKG.  Will hydrate        Disposition and Plan     Clinical Impression:  1. Syncope, near    2. Hypokalemia         Disposition:  Discharge  6/22/2025  3:14 pm    Follow-up:  Darling Sutton MD  [address] [place] [address]  [phone]    Follow up      We recommend that you schedule follow up care with a primary care provider within the next three months to obtain basic health screening including reassessment of your blood pressure.      Medications Prescribed:  Current Discharge Medication List                Supplementary Documentation:

## 2025-06-25 ENCOUNTER — HOSPITAL ENCOUNTER (OUTPATIENT)
Dept: CT IMAGING | Facility: HOSPITAL | Age: 75
Discharge: HOME | End: 2025-06-25
Attending: INTERNAL MEDICINE
Payer: MEDICARE

## 2025-06-25 DIAGNOSIS — R91.1 LUNG NODULE: ICD-10-CM

## 2025-06-25 DIAGNOSIS — C61 PROSTATE CANCER (HCC): ICD-10-CM

## 2025-06-25 PROCEDURE — 71250 CT THORAX DX C-: CPT | Performed by: INTERNAL MEDICINE

## 2025-07-17 ENCOUNTER — RESULTS FOLLOW-UP (OUTPATIENT)
Age: 75
End: 2025-07-17

## 2025-07-17 DIAGNOSIS — R91.1 LUNG NODULE: ICD-10-CM

## 2025-07-17 DIAGNOSIS — I10 ESSENTIAL HYPERTENSION: ICD-10-CM

## 2025-07-17 DIAGNOSIS — C61 PROSTATE CANCER (HCC): ICD-10-CM

## 2025-07-17 NOTE — TELEPHONE ENCOUNTER
LVM for patient informing him CT shows stable lung nodule, repeat CT chest in 6 mos.    Zayantet message sent as well

## 2025-07-21 RX ORDER — OLMESARTAN MEDOXOMIL 20 MG/1
20 TABLET ORAL DAILY
Qty: 90 TABLET | Refills: 0 | Status: SHIPPED | OUTPATIENT
Start: 2025-07-21

## 2025-07-22 ENCOUNTER — OFFICE VISIT (OUTPATIENT)
Dept: INTERNAL MEDICINE CLINIC | Facility: CLINIC | Age: 75
End: 2025-07-22
Payer: MEDICARE

## 2025-07-22 ENCOUNTER — LAB ENCOUNTER (OUTPATIENT)
Dept: LAB | Age: 75
End: 2025-07-22
Attending: INTERNAL MEDICINE

## 2025-07-22 VITALS
SYSTOLIC BLOOD PRESSURE: 110 MMHG | HEIGHT: 71.5 IN | WEIGHT: 218 LBS | HEART RATE: 75 BPM | BODY MASS INDEX: 29.85 KG/M2 | DIASTOLIC BLOOD PRESSURE: 73 MMHG

## 2025-07-22 DIAGNOSIS — C61 PROSTATE CANCER (HCC): ICD-10-CM

## 2025-07-22 DIAGNOSIS — K44.9 HIATAL HERNIA: ICD-10-CM

## 2025-07-22 DIAGNOSIS — Z00.00 ANNUAL PHYSICAL EXAM: Primary | ICD-10-CM

## 2025-07-22 DIAGNOSIS — Z00.00 ANNUAL PHYSICAL EXAM: ICD-10-CM

## 2025-07-22 DIAGNOSIS — E78.5 HYPERLIPIDEMIA, UNSPECIFIED HYPERLIPIDEMIA TYPE: ICD-10-CM

## 2025-07-22 DIAGNOSIS — I10 ESSENTIAL HYPERTENSION: ICD-10-CM

## 2025-07-22 DIAGNOSIS — R73.03 PREDIABETES: ICD-10-CM

## 2025-07-22 LAB
CHOLEST SERPL-MCNC: 155 MG/DL (ref ?–200)
FASTING PATIENT LIPID ANSWER: YES
HDLC SERPL-MCNC: 41 MG/DL (ref 40–59)
LDLC SERPL CALC-MCNC: 90 MG/DL (ref ?–100)
NONHDLC SERPL-MCNC: 114 MG/DL (ref ?–130)
T3FREE SERPL-MCNC: 2.92 PG/ML (ref 2.4–4.2)
T4 FREE SERPL-MCNC: 1.4 NG/DL (ref 0.8–1.7)
TRIGL SERPL-MCNC: 135 MG/DL (ref 30–149)
TSI SER-ACNC: 0.45 UIU/ML (ref 0.55–4.78)
VLDLC SERPL CALC-MCNC: 22 MG/DL (ref 0–30)

## 2025-07-22 PROCEDURE — 84481 FREE ASSAY (FT-3): CPT

## 2025-07-22 PROCEDURE — 3074F SYST BP LT 130 MM HG: CPT | Performed by: INTERNAL MEDICINE

## 2025-07-22 PROCEDURE — 80061 LIPID PANEL: CPT

## 2025-07-22 PROCEDURE — 84439 ASSAY OF FREE THYROXINE: CPT

## 2025-07-22 PROCEDURE — 3008F BODY MASS INDEX DOCD: CPT | Performed by: INTERNAL MEDICINE

## 2025-07-22 PROCEDURE — 84443 ASSAY THYROID STIM HORMONE: CPT

## 2025-07-22 PROCEDURE — 1125F AMNT PAIN NOTED PAIN PRSNT: CPT | Performed by: INTERNAL MEDICINE

## 2025-07-22 PROCEDURE — G0439 PPPS, SUBSEQ VISIT: HCPCS | Performed by: INTERNAL MEDICINE

## 2025-07-22 PROCEDURE — 1160F RVW MEDS BY RX/DR IN RCRD: CPT | Performed by: INTERNAL MEDICINE

## 2025-07-22 PROCEDURE — 3078F DIAST BP <80 MM HG: CPT | Performed by: INTERNAL MEDICINE

## 2025-07-22 PROCEDURE — 96160 PT-FOCUSED HLTH RISK ASSMT: CPT | Performed by: INTERNAL MEDICINE

## 2025-07-22 PROCEDURE — 1170F FXNL STATUS ASSESSED: CPT | Performed by: INTERNAL MEDICINE

## 2025-07-22 PROCEDURE — 1159F MED LIST DOCD IN RCRD: CPT | Performed by: INTERNAL MEDICINE

## 2025-07-22 PROCEDURE — 36415 COLL VENOUS BLD VENIPUNCTURE: CPT

## 2025-07-22 NOTE — PROGRESS NOTES
Subjective:   Sin Rao is a 74 year old male who presents for a MA AHA (Medicare Advantage Annual Health Assessment) and Subsequent Annual Wellness visit (Pt already had Initial Annual Wellness) and scheduled follow up of multiple significant but stable problems.   History of Present Illness  Sin Rao is a 74 year old male with advanced prostate cancer who presents with a recent episode of syncope.    Approximately four weeks ago, he experienced a syncopal episode while standing in line at a store. He was told it was heat-related dehydration. He had not eaten prior to the incident and had taken his medication. During the episode, he lost consciousness and fell, landing on his elbow and buttocks without hitting his head. He was unable to move immediately after the fall and believed his blood pressure had gone real low. ER records reviewed from the incident.     He has advanced prostate cancer and is on medication to maintain low PSA levels. He receives regular injections, with the next one scheduled for September. He is under the care of an oncologist and urologist.    He experiences a sensation of fullness and hunger after eating, attributed to a stomach hernia diagnosed previously. He has a history of hernia repairs and notes that the sensation occurs regardless of the amount of food consumed. No pain is associated with this sensation.    He experiences occasional constipation, which he manages by consuming fruits and oatmeal for fiber. No significant pain or discomfort related to his bowel habits.    He notes occasional puffiness in his ankles, which he attributes to his medication. He has a history of weight loss, which he associates with his medical condition.    He experiences coughing when laughing while lying down, which resolves by sitting up with pillows.    History/Other:   Fall Risk Assessment:   He has been screened for Falls and is High Risk. Fall Prevention information provided to patient  in After Visit Summary.    Do you feel unsteady when standing or walking?: (Patient-Rptd) No  Do you worry about falling?: (Patient-Rptd) No  Have you fallen in the past year?: (Patient-Rptd) Yes  How many times have you fallen?: (Patient-Rptd) (P) 1  Were you injured?: (Patient-Rptd) (P) Yes     Cognitive Assessment:   He had a completely normal cognitive assessment - see flowsheet entries     Functional Ability/Status:   Sin Rao has some abnormal functions as listed below:  He has Vision problems based on screening of functional status.       Depression Screening (PHQ):  PHQ-2 SCORE: 0  , done 7/20/2025        5 minutes spent screening and counseling for depression    Advanced Directives:   He does have a Living Will but we do NOT have it on file in Epic.    He does have a POA but we do NOT have it on file in Epic.    Discussed Advance Care Planning with patient (and family/surrogate if present). Standard forms made available to patient in After Visit Summary.      Problem List[1]  Allergies:  He is allergic to bees.    Current Medications:  Active Meds, Sig Only[2]    Medical History:  He  has a past medical history of Age-related nuclear cataract of both eyes (10/22/2019), Aortic atherosclerosis, Dyslipidemia, Essential hypertension, Glaucoma suspect of both eyes (10/22/2019), adenomatous colonic polyps (12/2020), Pancolonic diverticulosis, Prediabetes, Pulmonary hypertension (HCC), and Vitreous floaters of both eyes (10/22/2019).  Surgical History:  He  has a past surgical history that includes hernia surgery (2008, 2010).   Family History:  His family history includes Heart Disorder in his brother; Hypertension in his brother, brother, mother, and sister.  Social History:  He  reports that he has never smoked. He has never been exposed to tobacco smoke. He has never used smokeless tobacco. He reports current alcohol use. He reports that he does not use drugs.    Tobacco:  He has never smoked  tobacco.    CAGE Alcohol Screen:   CAGE screening score of 0 on 7/20/2025, showing low risk of alcohol abuse.      Patient Care Team:  Darling Sutton MD as PCP - General (Internal Medicine)  Azeem, Generic Provider  Lyle Fang MD (Radiation Oncology)  Bahman Calderon MD (Radiation Oncology)    Review of Systems  GENERAL: feels well otherwise  SKIN: denies any unusual skin lesions  EYES: denies blurred vision or double vision  HEENT: denies nasal congestion, sinus pain or ST  LUNGS: denies shortness of breath with exertion  CARDIOVASCULAR: denies chest pain on exertion  GI: denies abdominal pain, denies heartburn  : 0 per night nocturia, no complaint of urinary incontinence  MUSCULOSKELETAL: denies back pain  NEURO: denies headaches  PSYCHE: denies depression or anxiety  HEMATOLOGIC: denies hx of anemia  ENDOCRINE: denies thyroid history  ALL/ASTHMA: denies hx of allergy or asthma    Objective:   Physical Exam  General Appearance:  Alert, cooperative, no distress, appears stated age   Head:  Normocephalic, without obvious abnormality, atraumatic   Eyes:  PERRL, conjunctiva/corneas clear, EOM's intact, both eyes   Ears:  Normal TM's and external ear canals, both ears   Nose: Nares normal, septum midline, mucosa normal, no drainage or sinus tenderness   Throat: Lips, mucosa, and tongue normal; teeth and gums normal   Neck: Supple, symmetrical, trachea midline, no adenopathy, thyroid: not enlarged, symmetric, no tenderness/mass/nodules, no carotid bruit or JVD   Back:   Symmetric, no curvature, ROM normal, no CVA tenderness   Lungs:   Clear to auscultation bilaterally, respirations unlabored   Chest Wall:  No tenderness or deformity   Heart:  Regular rate and rhythm, S1, S2 normal, no murmur, rub or gallop   Abdomen:   Soft, non-tender, bowel sounds active all four quadrants,  no masses, no organomegaly   Genitalia: Normal male   Rectal: Normal tone, normal prostate, no masses or tenderness    Extremities: Extremities normal, atraumatic, no cyanosis or edema   Pulses: 2+ and symmetric   Skin: Skin color, texture, turgor normal, no rashes or lesions   Lymph nodes: Cervical, supraclavicular, and axillary nodes normal   Neurologic: Normal     /73   Pulse 75   Ht 5' 11.5\" (1.816 m)   Wt 218 lb (98.9 kg)   BMI 29.98 kg/m²  Estimated body mass index is 29.98 kg/m² as calculated from the following:    Height as of this encounter: 5' 11.5\" (1.816 m).    Weight as of this encounter: 218 lb (98.9 kg).    Medicare Hearing Assessment:   Hearing Screening    Time taken: 7/22/2025  9:37 AM  Screening Method: Finger Rub  Finger Rub Result: Pass         Visual Acuity:   Right Eye Visual Acuity: Corrected Right Eye Chart Acuity: 20/15   Left Eye Visual Acuity: Corrected Left Eye Chart Acuity: 20/15   Both Eyes Visual Acuity: Corrected Both Eyes Chart Acuity: 20/15            Assessment & Plan:   Sin Rao is a 74 year old male who presents for a Medicare Assessment.     1. Annual physical exam (Primary)  -     Lipid Panel; Future; Expected date: 07/22/2025  -     TSH W Reflex To Free T4; Future; Expected date: 07/22/2025  Return yearly for physicals  Follow up with dentist every 6 months  Follow up with eye doctor yearly  Recommend aerobic exercise for at least 30mins 5 days a week  Yearly flu shot  Tetanus booster every 10 years (Tdap/ Td)  Labs ordered/ or reviewed if done prior to appointment       2. Essential hypertension  Well controlled   On olmesartan and hctz    3. Prostate cancer (HCC)  -follows up with Dr. Martinez  - on tony     4. Hyperlipidemia, unspecified hyperlipidemia type  Diet and lifestyle modifications     5. Prediabetes  Overview:  Body mass index is 32.46 kg/m².   Diet and lifestyle modifications    6. Hiatal hernia  - asymptomatic     The patient indicates understanding of these issues and agrees to the plan.  Reinforced healthy diet, lifestyle, and exercise.      No follow-ups on  file.     Darling Sutton MD, 7/22/2025     Supplementary Documentation:   General Health:  In the past six months, have you lost more than 10 pounds without trying?: (Patient-Rptd) 2 - No  Has your appetite been poor?: (Patient-Rptd) No  Type of Diet: (Patient-Rptd) Balanced  How does the patient maintain a good energy level?: (Patient-Rptd) Daily Walks  How would you describe your daily physical activity?: (Patient-Rptd) Moderate  How would you describe your current health state?: (Patient-Rptd) Good  How do you maintain positive mental well-being?: (Patient-Rptd) Visiting Friends  On a scale of 0 to 10, with 0 being no pain and 10 being severe pain, what is your pain level?: (Patient-Rptd) 0 - (None)  In the past six months, have you experienced urine leakage?: (Patient-Rptd) 0-No  At any time do you feel concerned for the safety/well-being of yourself and/or your children, in your home or elsewhere?: (Patient-Rptd) No  Have you had any immunizations at another office such as Influenza, Hepatitis B, Tetanus, or Pneumococcal?: (Patient-Rptd) Yes    Health Maintenance   Topic Date Due    Annual Well Visit  01/01/2025    Colorectal Cancer Screening  12/14/2025    COVID-19 Vaccine (7 - 2024-25 season) 06/30/2026 (Originally 9/1/2024)    Zoster Vaccines (1 of 2) 07/09/2026 (Originally 11/10/1969)    Influenza Vaccine (1) 10/01/2025    Annual Depression Screening  Completed    Fall Risk Screening (Annual)  Completed    Pneumococcal Vaccine: 50+ Years  Completed    Meningococcal B Vaccine  Aged Out            [1]   Patient Active Problem List  Diagnosis    Essential hypertension    Dyslipidemia    Prediabetes    Age-related nuclear cataract of both eyes    Aortic atherosclerosis    Hx of adenomatous colonic polyps    Pancolonic diverticulosis    Prostate cancer (HCC)    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 Take with a low fat meal    predniSONE 5 MG Oral Tab Take 1 tablet (5 mg total) by mouth daily.    hydroCHLOROthiazide 25 MG Oral Tab Take 1 tablet (25 mg total) by mouth daily.    aspirin 81 MG Oral Tab Take 1 tablet (81 mg total) by mouth in the morning.

## (undated) NOTE — Clinical Note
Elizabeth Alvarenga Could you see this isis after his PET scan? Elevated PSA, abnormal HOMERO. MRI with obturator node that was biopsy proven undifferentiated prostate cancer. I ordered a PET. If you need prostate tissue, I can arrange a biopsy.  Edgardo

## (undated) NOTE — LETTER
October 22, 2019    Derick Valerio, [address]  [address]     Patient: Elizabet Nicholas   YOB: 1950   Date of Visit: 10/22/2019       Dear Dr. Carlos Elizondo MD:    Thank you for referring Daniel Talamantes to me for evaluation.  He Genitourinary, Musculoskeletal, HENT, Endocrine, Cardiovascular, Respiratory, Psychiatric, Allergic/Imm, Heme/Lymph    Last edited by Karey Harrington OT on 10/22/2019  2:47 PM. (History)          PHYSICAL EXAM:     Base Eye Exam     Visual Acuity (Snellen ASSESSMENT/PLAN:     Diagnoses and Plan:     Glaucoma suspect of both eyes  Discussed with patient that he is a glaucoma suspect based on increased cupping of the optic nerves in both eyes. Retinal photos taken today to document optic nerves.   Glaucoma d

## (undated) NOTE — LETTER
8/2/2024          Sin Rao        [address]         [facility] [address]         To Whom It May Concern,    Sin may hold his Aspirin 5 days prior to his biopsy- scheduled on 8/7/24.        Sincerely,       Dr. Sutton  [address] [place]. [place]  [phone]            Document electronically generated by:  Roro SHABAZZ RN